# Patient Record
Sex: MALE | Race: BLACK OR AFRICAN AMERICAN | Employment: UNEMPLOYED
[De-identification: names, ages, dates, MRNs, and addresses within clinical notes are randomized per-mention and may not be internally consistent; named-entity substitution may affect disease eponyms.]

---

## 2017-01-24 ENCOUNTER — OFFICE VISIT (OUTPATIENT)
Dept: INTERNAL MEDICINE | Facility: CLINIC | Age: 42
End: 2017-01-24

## 2017-01-24 VITALS
WEIGHT: 228.6 LBS | DIASTOLIC BLOOD PRESSURE: 70 MMHG | RESPIRATION RATE: 21 BRPM | HEART RATE: 86 BPM | SYSTOLIC BLOOD PRESSURE: 130 MMHG | TEMPERATURE: 99 F | BODY MASS INDEX: 35.88 KG/M2 | HEIGHT: 67 IN | OXYGEN SATURATION: 98 %

## 2017-01-24 DIAGNOSIS — R31.9 HEMATURIA: Primary | ICD-10-CM

## 2017-01-24 PROCEDURE — 99213 OFFICE O/P EST LOW 20 MIN: CPT | Performed by: FAMILY MEDICINE

## 2017-01-24 RX ORDER — CLOTRIMAZOLE AND BETAMETHASONE DIPROPIONATE 10; .64 MG/G; MG/G
CREAM TOPICAL
Refills: 0 | COMMUNITY
Start: 2017-01-02 | End: 2017-03-14

## 2017-01-24 ASSESSMENT — ENCOUNTER SYMPTOMS
FACIAL SWELLING: 0
EYE REDNESS: 0
SHORTNESS OF BREATH: 0
CONSTIPATION: 0
BACK PAIN: 0
COUGH: 0
SORE THROAT: 0
TROUBLE SWALLOWING: 0
STRIDOR: 0
ABDOMINAL PAIN: 1
WHEEZING: 0
ABDOMINAL DISTENTION: 0
ANAL BLEEDING: 0
EYE DISCHARGE: 0
EYE PAIN: 0
CHEST TIGHTNESS: 0
COLOR CHANGE: 0

## 2017-01-24 ASSESSMENT — PATIENT HEALTH QUESTIONNAIRE - PHQ9
1. LITTLE INTEREST OR PLEASURE IN DOING THINGS: 0
2. FEELING DOWN, DEPRESSED OR HOPELESS: 0
SUM OF ALL RESPONSES TO PHQ9 QUESTIONS 1 & 2: 0
SUM OF ALL RESPONSES TO PHQ QUESTIONS 1-9: 0

## 2017-01-27 ENCOUNTER — TELEPHONE (OUTPATIENT)
Dept: INTERNAL MEDICINE | Facility: CLINIC | Age: 42
End: 2017-01-27

## 2017-01-27 DIAGNOSIS — R31.9 HEMATURIA: Primary | ICD-10-CM

## 2017-02-03 DIAGNOSIS — I10 ESSENTIAL HYPERTENSION: ICD-10-CM

## 2017-02-06 RX ORDER — HYDROCHLOROTHIAZIDE 25 MG/1
TABLET ORAL
Qty: 30 TABLET | Refills: 5 | Status: SHIPPED | OUTPATIENT
Start: 2017-02-06 | End: 2017-08-10 | Stop reason: SDUPTHER

## 2017-02-07 ENCOUNTER — TELEPHONE (OUTPATIENT)
Dept: INTERNAL MEDICINE | Facility: CLINIC | Age: 42
End: 2017-02-07

## 2017-02-23 ENCOUNTER — OFFICE VISIT (OUTPATIENT)
Dept: UROLOGY | Facility: CLINIC | Age: 42
End: 2017-02-23

## 2017-02-23 VITALS
DIASTOLIC BLOOD PRESSURE: 88 MMHG | SYSTOLIC BLOOD PRESSURE: 120 MMHG | HEIGHT: 67 IN | WEIGHT: 228 LBS | TEMPERATURE: 99 F | BODY MASS INDEX: 35.79 KG/M2 | HEART RATE: 92 BPM

## 2017-02-23 DIAGNOSIS — R31.9 HEMATURIA: ICD-10-CM

## 2017-02-23 DIAGNOSIS — R35.0 FREQUENCY OF URINATION: Primary | ICD-10-CM

## 2017-02-23 LAB
BILIRUBIN, POC: ABNORMAL
BLOOD URINE, POC: ABNORMAL
CLARITY, POC: CLEAR
COLOR, POC: ABNORMAL
GLUCOSE URINE, POC: ABNORMAL
KETONES, POC: ABNORMAL
LEUKOCYTE EST, POC: ABNORMAL
NITRITE, POC: ABNORMAL
PH, POC: ABNORMAL
PROTEIN, POC: ABNORMAL
SPECIFIC GRAVITY, POC: ABNORMAL
UROBILINOGEN, POC: ABNORMAL

## 2017-02-23 PROCEDURE — 81002 URINALYSIS NONAUTO W/O SCOPE: CPT | Performed by: UROLOGY

## 2017-02-23 PROCEDURE — 99204 OFFICE O/P NEW MOD 45 MIN: CPT | Performed by: UROLOGY

## 2017-02-23 ASSESSMENT — ENCOUNTER SYMPTOMS
WHEEZING: 0
BACK PAIN: 0
COUGH: 0
EYE PAIN: 0
ABDOMINAL PAIN: 1
NAUSEA: 0
SHORTNESS OF BREATH: 1
EYE REDNESS: 0
VOMITING: 0
COLOR CHANGE: 0

## 2017-02-28 ENCOUNTER — HOSPITAL ENCOUNTER (OUTPATIENT)
Age: 42
Setting detail: SPECIMEN
Discharge: HOME | End: 2017-02-28
Payer: MEDICAID

## 2017-02-28 ENCOUNTER — OFFICE VISIT (OUTPATIENT)
Dept: INTERNAL MEDICINE | Facility: CLINIC | Age: 42
End: 2017-02-28

## 2017-02-28 ENCOUNTER — TELEPHONE (OUTPATIENT)
Dept: UROLOGY | Facility: CLINIC | Age: 42
End: 2017-02-28

## 2017-02-28 VITALS
HEART RATE: 95 BPM | HEIGHT: 67 IN | TEMPERATURE: 98.6 F | SYSTOLIC BLOOD PRESSURE: 142 MMHG | WEIGHT: 227.2 LBS | BODY MASS INDEX: 35.66 KG/M2 | RESPIRATION RATE: 16 BRPM | DIASTOLIC BLOOD PRESSURE: 80 MMHG | OXYGEN SATURATION: 96 %

## 2017-02-28 DIAGNOSIS — K21.9 GASTROESOPHAGEAL REFLUX DISEASE WITHOUT ESOPHAGITIS: ICD-10-CM

## 2017-02-28 DIAGNOSIS — R73.03 PRE-DIABETES: ICD-10-CM

## 2017-02-28 DIAGNOSIS — E78.5 DYSLIPIDEMIA: ICD-10-CM

## 2017-02-28 DIAGNOSIS — I10 ESSENTIAL HYPERTENSION: ICD-10-CM

## 2017-02-28 DIAGNOSIS — R31.9 HEMATURIA: Primary | ICD-10-CM

## 2017-02-28 PROCEDURE — 99213 OFFICE O/P EST LOW 20 MIN: CPT | Performed by: FAMILY MEDICINE

## 2017-02-28 RX ORDER — OMEPRAZOLE 20 MG/1
CAPSULE, DELAYED RELEASE ORAL
Qty: 30 CAPSULE | Refills: 5 | Status: SHIPPED | OUTPATIENT
Start: 2017-02-28 | End: 2017-10-05 | Stop reason: SDUPTHER

## 2017-02-28 ASSESSMENT — ENCOUNTER SYMPTOMS
CHEST TIGHTNESS: 0
WHEEZING: 0
SHORTNESS OF BREATH: 0
TROUBLE SWALLOWING: 0
BACK PAIN: 0
EYE PAIN: 0
ANAL BLEEDING: 0
COUGH: 0
FACIAL SWELLING: 0
STRIDOR: 0
ABDOMINAL PAIN: 0
EYE REDNESS: 0
COLOR CHANGE: 0
EYE DISCHARGE: 0
ABDOMINAL DISTENTION: 0
CONSTIPATION: 0
SORE THROAT: 0

## 2017-03-01 LAB
ESTIMATED AVERAGE GLUCOSE: 143 MG/DL
HBA1C MFR BLD: 6.6 % (ref 4–6)

## 2017-03-14 ENCOUNTER — HOSPITAL ENCOUNTER (OUTPATIENT)
Dept: PREADMISSION TESTING | Age: 42
Discharge: HOME | End: 2017-03-14
Payer: MEDICAID

## 2017-03-14 VITALS
DIASTOLIC BLOOD PRESSURE: 90 MMHG | WEIGHT: 226.85 LBS | HEIGHT: 66 IN | HEART RATE: 92 BPM | BODY MASS INDEX: 36.46 KG/M2 | RESPIRATION RATE: 20 BRPM | TEMPERATURE: 99 F | OXYGEN SATURATION: 98 % | SYSTOLIC BLOOD PRESSURE: 138 MMHG

## 2017-03-14 LAB
ANION GAP SERPL CALCULATED.3IONS-SCNC: 16 MMOL/L (ref 9–17)
BUN BLDV-MCNC: 12 MG/DL (ref 6–20)
CHLORIDE BLD-SCNC: 99 MMOL/L (ref 98–107)
CO2: 26 MMOL/L (ref 20–31)
CREAT SERPL-MCNC: 1.04 MG/DL (ref 0.7–1.2)
GFR AFRICAN AMERICAN: >60 ML/MIN
GFR NON-AFRICAN AMERICAN: >60 ML/MIN
GFR SERPL CREATININE-BSD FRML MDRD: NORMAL ML/MIN/{1.73_M2}
GFR SERPL CREATININE-BSD FRML MDRD: NORMAL ML/MIN/{1.73_M2}
GLUCOSE BLD-MCNC: 119 MG/DL (ref 70–99)
POTASSIUM SERPL-SCNC: 3.6 MMOL/L (ref 3.7–5.3)
SODIUM BLD-SCNC: 141 MMOL/L (ref 135–144)

## 2017-03-14 PROCEDURE — 87086 URINE CULTURE/COLONY COUNT: CPT

## 2017-03-14 PROCEDURE — 82947 ASSAY GLUCOSE BLOOD QUANT: CPT

## 2017-03-14 PROCEDURE — 84520 ASSAY OF UREA NITROGEN: CPT

## 2017-03-14 PROCEDURE — 36415 COLL VENOUS BLD VENIPUNCTURE: CPT

## 2017-03-14 PROCEDURE — 80051 ELECTROLYTE PANEL: CPT

## 2017-03-14 PROCEDURE — 82565 ASSAY OF CREATININE: CPT

## 2017-03-14 RX ORDER — SODIUM CHLORIDE, SODIUM LACTATE, POTASSIUM CHLORIDE, CALCIUM CHLORIDE 600; 310; 30; 20 MG/100ML; MG/100ML; MG/100ML; MG/100ML
1000 INJECTION, SOLUTION INTRAVENOUS CONTINUOUS
Status: CANCELLED | OUTPATIENT
Start: 2017-03-14

## 2017-03-15 LAB
CULTURE: NO GROWTH
CULTURE: NORMAL
Lab: NORMAL
SPECIMEN DESCRIPTION: NORMAL
STATUS: NORMAL

## 2017-03-20 ENCOUNTER — TELEPHONE (OUTPATIENT)
Dept: UROLOGY | Age: 42
End: 2017-03-20

## 2017-03-21 ENCOUNTER — ANESTHESIA (OUTPATIENT)
Dept: OPERATING ROOM | Age: 42
End: 2017-03-21
Payer: MEDICAID

## 2017-03-21 ENCOUNTER — ANESTHESIA EVENT (OUTPATIENT)
Dept: OPERATING ROOM | Age: 42
End: 2017-03-21
Payer: MEDICAID

## 2017-03-21 ENCOUNTER — HOSPITAL ENCOUNTER (OUTPATIENT)
Age: 42
Setting detail: OUTPATIENT SURGERY
Discharge: HOME | End: 2017-03-21
Attending: UROLOGY | Admitting: UROLOGY
Payer: MEDICAID

## 2017-03-21 ENCOUNTER — APPOINTMENT (OUTPATIENT)
Dept: GENERAL RADIOLOGY | Age: 42
End: 2017-03-21
Attending: UROLOGY
Payer: MEDICAID

## 2017-03-21 VITALS
DIASTOLIC BLOOD PRESSURE: 93 MMHG | HEIGHT: 66 IN | SYSTOLIC BLOOD PRESSURE: 139 MMHG | RESPIRATION RATE: 18 BRPM | OXYGEN SATURATION: 96 % | HEART RATE: 82 BPM | TEMPERATURE: 97.2 F | BODY MASS INDEX: 36.32 KG/M2 | WEIGHT: 226 LBS

## 2017-03-21 VITALS — OXYGEN SATURATION: 96 % | DIASTOLIC BLOOD PRESSURE: 73 MMHG | SYSTOLIC BLOOD PRESSURE: 94 MMHG

## 2017-03-21 LAB
CASE NUMBER:: NORMAL
POC POTASSIUM: 3.5 MMOL/L (ref 3.5–5.1)
SPECIMEN DESCRIPTION: NORMAL

## 2017-03-21 PROCEDURE — 84132 ASSAY OF SERUM POTASSIUM: CPT

## 2017-03-21 PROCEDURE — 74420 UROGRAPHY RTRGR +-KUB: CPT

## 2017-03-21 PROCEDURE — 3600000002 HC SURGERY LEVEL 2 BASE: Performed by: UROLOGY

## 2017-03-21 PROCEDURE — 7100000000 HC PACU RECOVERY - FIRST 15 MIN: Performed by: UROLOGY

## 2017-03-21 PROCEDURE — 6360000004 HC RX CONTRAST MEDICATION: Performed by: UROLOGY

## 2017-03-21 PROCEDURE — C1758 CATHETER, URETERAL: HCPCS | Performed by: UROLOGY

## 2017-03-21 PROCEDURE — 6360000002 HC RX W HCPCS: Performed by: ANESTHESIOLOGY

## 2017-03-21 PROCEDURE — 6360000002 HC RX W HCPCS: Performed by: UROLOGY

## 2017-03-21 PROCEDURE — 7100000001 HC PACU RECOVERY - ADDTL 15 MIN: Performed by: UROLOGY

## 2017-03-21 PROCEDURE — 2580000003 HC RX 258: Performed by: ANESTHESIOLOGY

## 2017-03-21 PROCEDURE — 3700000000 HC ANESTHESIA ATTENDED CARE: Performed by: UROLOGY

## 2017-03-21 PROCEDURE — 7100000010 HC PHASE II RECOVERY - FIRST 15 MIN: Performed by: UROLOGY

## 2017-03-21 PROCEDURE — 3700000001 HC ADD 15 MINUTES (ANESTHESIA): Performed by: UROLOGY

## 2017-03-21 PROCEDURE — 6360000002 HC RX W HCPCS: Performed by: NURSE ANESTHETIST, CERTIFIED REGISTERED

## 2017-03-21 PROCEDURE — 88112 CYTOPATH CELL ENHANCE TECH: CPT

## 2017-03-21 PROCEDURE — 3600000012 HC SURGERY LEVEL 2 ADDTL 15MIN: Performed by: UROLOGY

## 2017-03-21 PROCEDURE — 2500000003 HC RX 250 WO HCPCS: Performed by: NURSE ANESTHETIST, CERTIFIED REGISTERED

## 2017-03-21 RX ORDER — LIDOCAINE HYDROCHLORIDE 10 MG/ML
INJECTION, SOLUTION INFILTRATION; PERINEURAL PRN
Status: DISCONTINUED | OUTPATIENT
Start: 2017-03-21 | End: 2017-03-21 | Stop reason: SDUPTHER

## 2017-03-21 RX ORDER — ONDANSETRON 2 MG/ML
INJECTION INTRAMUSCULAR; INTRAVENOUS PRN
Status: DISCONTINUED | OUTPATIENT
Start: 2017-03-21 | End: 2017-03-21 | Stop reason: SDUPTHER

## 2017-03-21 RX ORDER — FENTANYL CITRATE 50 UG/ML
INJECTION, SOLUTION INTRAMUSCULAR; INTRAVENOUS PRN
Status: DISCONTINUED | OUTPATIENT
Start: 2017-03-21 | End: 2017-03-21 | Stop reason: SDUPTHER

## 2017-03-21 RX ORDER — MEPERIDINE HYDROCHLORIDE 50 MG/ML
12.5 INJECTION INTRAMUSCULAR; INTRAVENOUS; SUBCUTANEOUS EVERY 5 MIN PRN
Status: DISCONTINUED | OUTPATIENT
Start: 2017-03-21 | End: 2017-03-21 | Stop reason: HOSPADM

## 2017-03-21 RX ORDER — CIPROFLOXACIN 2 MG/ML
400 INJECTION, SOLUTION INTRAVENOUS
Status: COMPLETED | OUTPATIENT
Start: 2017-03-21 | End: 2017-03-21

## 2017-03-21 RX ORDER — ONDANSETRON 2 MG/ML
4 INJECTION INTRAMUSCULAR; INTRAVENOUS
Status: DISCONTINUED | OUTPATIENT
Start: 2017-03-21 | End: 2017-03-21 | Stop reason: HOSPADM

## 2017-03-21 RX ORDER — PROPOFOL 10 MG/ML
INJECTION, EMULSION INTRAVENOUS PRN
Status: DISCONTINUED | OUTPATIENT
Start: 2017-03-21 | End: 2017-03-21 | Stop reason: SDUPTHER

## 2017-03-21 RX ORDER — FENTANYL CITRATE 50 UG/ML
50 INJECTION, SOLUTION INTRAMUSCULAR; INTRAVENOUS EVERY 5 MIN PRN
Status: DISCONTINUED | OUTPATIENT
Start: 2017-03-21 | End: 2017-03-21 | Stop reason: HOSPADM

## 2017-03-21 RX ORDER — FENTANYL CITRATE 50 UG/ML
25 INJECTION, SOLUTION INTRAMUSCULAR; INTRAVENOUS EVERY 5 MIN PRN
Status: DISCONTINUED | OUTPATIENT
Start: 2017-03-21 | End: 2017-03-21 | Stop reason: HOSPADM

## 2017-03-21 RX ORDER — MIDAZOLAM HYDROCHLORIDE 1 MG/ML
INJECTION INTRAMUSCULAR; INTRAVENOUS PRN
Status: DISCONTINUED | OUTPATIENT
Start: 2017-03-21 | End: 2017-03-21 | Stop reason: SDUPTHER

## 2017-03-21 RX ORDER — SUCCINYLCHOLINE CHLORIDE 20 MG/ML
INJECTION INTRAMUSCULAR; INTRAVENOUS PRN
Status: DISCONTINUED | OUTPATIENT
Start: 2017-03-21 | End: 2017-03-21 | Stop reason: SDUPTHER

## 2017-03-21 RX ORDER — SODIUM CHLORIDE, SODIUM LACTATE, POTASSIUM CHLORIDE, CALCIUM CHLORIDE 600; 310; 30; 20 MG/100ML; MG/100ML; MG/100ML; MG/100ML
1000 INJECTION, SOLUTION INTRAVENOUS CONTINUOUS
Status: DISCONTINUED | OUTPATIENT
Start: 2017-03-21 | End: 2017-03-21 | Stop reason: HOSPADM

## 2017-03-21 RX ADMIN — LIDOCAINE HYDROCHLORIDE 50 MG: 10 INJECTION, SOLUTION INFILTRATION; PERINEURAL at 12:20

## 2017-03-21 RX ADMIN — FENTANYL CITRATE 50 MCG: 50 INJECTION INTRAMUSCULAR; INTRAVENOUS at 12:26

## 2017-03-21 RX ADMIN — FENTANYL CITRATE 50 MCG: 50 INJECTION INTRAMUSCULAR; INTRAVENOUS at 12:19

## 2017-03-21 RX ADMIN — SODIUM CHLORIDE, POTASSIUM CHLORIDE, SODIUM LACTATE AND CALCIUM CHLORIDE: 600; 310; 30; 20 INJECTION, SOLUTION INTRAVENOUS at 12:19

## 2017-03-21 RX ADMIN — SUCCINYLCHOLINE CHLORIDE 120 MG: 20 INJECTION, SOLUTION INTRAMUSCULAR; INTRAVENOUS at 12:59

## 2017-03-21 RX ADMIN — FENTANYL CITRATE 50 MCG: 50 INJECTION, SOLUTION INTRAMUSCULAR; INTRAVENOUS at 13:22

## 2017-03-21 RX ADMIN — MIDAZOLAM HYDROCHLORIDE 2 MG: 1 INJECTION, SOLUTION INTRAMUSCULAR; INTRAVENOUS at 12:19

## 2017-03-21 RX ADMIN — ONDANSETRON 4 MG: 2 INJECTION, SOLUTION INTRAMUSCULAR; INTRAVENOUS at 12:55

## 2017-03-21 RX ADMIN — CIPROFLOXACIN 400 MG: 2 INJECTION, SOLUTION INTRAVENOUS at 12:25

## 2017-03-21 RX ADMIN — PROPOFOL 200 MG: 10 INJECTION, EMULSION INTRAVENOUS at 12:20

## 2017-03-21 ASSESSMENT — PAIN DESCRIPTION - PAIN TYPE: TYPE: SURGICAL PAIN

## 2017-03-21 ASSESSMENT — PAIN - FUNCTIONAL ASSESSMENT: PAIN_FUNCTIONAL_ASSESSMENT: 0-10

## 2017-03-21 ASSESSMENT — PAIN SCALES - GENERAL
PAINLEVEL_OUTOF10: 0
PAINLEVEL_OUTOF10: 7

## 2017-03-21 ASSESSMENT — PAIN DESCRIPTION - LOCATION: LOCATION: PENIS

## 2017-03-21 ASSESSMENT — PAIN DESCRIPTION - DESCRIPTORS: DESCRIPTORS: BURNING

## 2017-03-22 LAB — SURGICAL PATHOLOGY REPORT: NORMAL

## 2017-04-18 ENCOUNTER — OFFICE VISIT (OUTPATIENT)
Dept: UROLOGY | Age: 42
End: 2017-04-18
Payer: MEDICAID

## 2017-04-18 VITALS
SYSTOLIC BLOOD PRESSURE: 123 MMHG | BODY MASS INDEX: 36.1 KG/M2 | HEART RATE: 108 BPM | TEMPERATURE: 99.1 F | DIASTOLIC BLOOD PRESSURE: 85 MMHG | HEIGHT: 67 IN | WEIGHT: 230 LBS

## 2017-04-18 DIAGNOSIS — R31.9 HEMATURIA: Primary | ICD-10-CM

## 2017-04-18 PROCEDURE — 99213 OFFICE O/P EST LOW 20 MIN: CPT | Performed by: UROLOGY

## 2017-04-18 RX ORDER — OXYCODONE HYDROCHLORIDE AND ACETAMINOPHEN 5; 325 MG/1; MG/1
TABLET ORAL
Refills: 0 | COMMUNITY
Start: 2017-03-06 | End: 2017-07-25 | Stop reason: ALTCHOICE

## 2017-04-18 RX ORDER — AMMONIUM LACTATE 12 G/100G
CREAM TOPICAL
Refills: 0 | COMMUNITY
Start: 2017-03-16 | End: 2017-10-05 | Stop reason: ALTCHOICE

## 2017-04-18 ASSESSMENT — ENCOUNTER SYMPTOMS
WHEEZING: 0
EYE PAIN: 0
BACK PAIN: 0
NAUSEA: 0
EYE REDNESS: 0
SHORTNESS OF BREATH: 0
ABDOMINAL PAIN: 0
COLOR CHANGE: 0
VOMITING: 0
COUGH: 0

## 2017-07-11 ENCOUNTER — TELEPHONE (OUTPATIENT)
Dept: INTERNAL MEDICINE CLINIC | Age: 42
End: 2017-07-11

## 2017-07-12 RX ORDER — POTASSIUM BICARBONATE 25 MEQ/1
TABLET, EFFERVESCENT ORAL
Qty: 60 TABLET | Refills: 5 | Status: SHIPPED | OUTPATIENT
Start: 2017-07-12 | End: 2018-04-10 | Stop reason: SDUPTHER

## 2017-07-25 ENCOUNTER — HOSPITAL ENCOUNTER (OUTPATIENT)
Age: 42
Setting detail: SPECIMEN
Discharge: HOME | End: 2017-07-25
Payer: MEDICAID

## 2017-07-25 ENCOUNTER — OFFICE VISIT (OUTPATIENT)
Dept: INTERNAL MEDICINE CLINIC | Age: 42
End: 2017-07-25
Payer: MEDICAID

## 2017-07-25 VITALS
WEIGHT: 224.6 LBS | BODY MASS INDEX: 35.25 KG/M2 | HEART RATE: 88 BPM | HEIGHT: 67 IN | TEMPERATURE: 97.4 F | DIASTOLIC BLOOD PRESSURE: 80 MMHG | OXYGEN SATURATION: 98 % | RESPIRATION RATE: 21 BRPM | SYSTOLIC BLOOD PRESSURE: 120 MMHG

## 2017-07-25 DIAGNOSIS — Z12.5 PROSTATE CANCER SCREENING: ICD-10-CM

## 2017-07-25 DIAGNOSIS — K21.9 GASTROESOPHAGEAL REFLUX DISEASE WITHOUT ESOPHAGITIS: ICD-10-CM

## 2017-07-25 DIAGNOSIS — R73.03 PRE-DIABETES: ICD-10-CM

## 2017-07-25 DIAGNOSIS — E78.5 DYSLIPIDEMIA: ICD-10-CM

## 2017-07-25 DIAGNOSIS — I10 ESSENTIAL HYPERTENSION: ICD-10-CM

## 2017-07-25 DIAGNOSIS — I10 ESSENTIAL HYPERTENSION: Primary | ICD-10-CM

## 2017-07-25 LAB
ANION GAP SERPL CALCULATED.3IONS-SCNC: 18 MMOL/L (ref 9–17)
BUN BLDV-MCNC: 11 MG/DL (ref 6–20)
BUN/CREAT BLD: ABNORMAL (ref 9–20)
CALCIUM SERPL-MCNC: 9.7 MG/DL (ref 8.6–10.4)
CHLORIDE BLD-SCNC: 101 MMOL/L (ref 98–107)
CO2: 25 MMOL/L (ref 20–31)
CREAT SERPL-MCNC: 0.84 MG/DL (ref 0.7–1.2)
GFR AFRICAN AMERICAN: >60 ML/MIN
GFR NON-AFRICAN AMERICAN: >60 ML/MIN
GFR SERPL CREATININE-BSD FRML MDRD: ABNORMAL ML/MIN/{1.73_M2}
GFR SERPL CREATININE-BSD FRML MDRD: ABNORMAL ML/MIN/{1.73_M2}
GLUCOSE BLD-MCNC: 107 MG/DL (ref 70–99)
POTASSIUM SERPL-SCNC: 4 MMOL/L (ref 3.7–5.3)
PROSTATE SPECIFIC ANTIGEN: 0.5 UG/L
SODIUM BLD-SCNC: 144 MMOL/L (ref 135–144)

## 2017-07-25 PROCEDURE — 99213 OFFICE O/P EST LOW 20 MIN: CPT | Performed by: FAMILY MEDICINE

## 2017-07-25 RX ORDER — POTASSIUM CHLORIDE 750 MG/1
TABLET, FILM COATED, EXTENDED RELEASE ORAL
Refills: 0 | COMMUNITY
Start: 2017-05-18 | End: 2017-07-25 | Stop reason: SDUPTHER

## 2017-07-25 ASSESSMENT — ENCOUNTER SYMPTOMS
STRIDOR: 0
ANAL BLEEDING: 0
EYE REDNESS: 0
FACIAL SWELLING: 0
CHEST TIGHTNESS: 0
COUGH: 0
WHEEZING: 0
BACK PAIN: 0
SHORTNESS OF BREATH: 0
ABDOMINAL DISTENTION: 0
EYE PAIN: 0
ABDOMINAL PAIN: 0
TROUBLE SWALLOWING: 0
COLOR CHANGE: 0
EYE DISCHARGE: 0
SORE THROAT: 0
CONSTIPATION: 0

## 2017-07-26 LAB
ESTIMATED AVERAGE GLUCOSE: 131 MG/DL
HBA1C MFR BLD: 6.2 % (ref 4–6)

## 2017-08-10 DIAGNOSIS — I10 ESSENTIAL HYPERTENSION: ICD-10-CM

## 2017-08-11 RX ORDER — HYDROCHLOROTHIAZIDE 25 MG/1
TABLET ORAL
Qty: 30 TABLET | Refills: 5 | Status: SHIPPED | OUTPATIENT
Start: 2017-08-11 | End: 2018-01-05

## 2017-09-20 ENCOUNTER — TELEPHONE (OUTPATIENT)
Dept: UROLOGY | Age: 42
End: 2017-09-20

## 2017-10-05 ENCOUNTER — OFFICE VISIT (OUTPATIENT)
Dept: FAMILY MEDICINE CLINIC | Age: 42
End: 2017-10-05
Payer: MEDICAID

## 2017-10-05 ENCOUNTER — OFFICE VISIT (OUTPATIENT)
Dept: UROLOGY | Age: 42
End: 2017-10-05
Payer: MEDICAID

## 2017-10-05 VITALS
DIASTOLIC BLOOD PRESSURE: 77 MMHG | RESPIRATION RATE: 20 BRPM | TEMPERATURE: 97.9 F | SYSTOLIC BLOOD PRESSURE: 120 MMHG | HEART RATE: 89 BPM | WEIGHT: 226 LBS | OXYGEN SATURATION: 94 % | BODY MASS INDEX: 35.47 KG/M2 | HEIGHT: 67 IN

## 2017-10-05 VITALS
SYSTOLIC BLOOD PRESSURE: 116 MMHG | WEIGHT: 228.6 LBS | DIASTOLIC BLOOD PRESSURE: 81 MMHG | TEMPERATURE: 98 F | BODY MASS INDEX: 35.88 KG/M2 | HEIGHT: 67 IN | HEART RATE: 80 BPM

## 2017-10-05 DIAGNOSIS — I10 ESSENTIAL HYPERTENSION: Primary | ICD-10-CM

## 2017-10-05 DIAGNOSIS — R39.15 URGENCY OF URINATION: Primary | ICD-10-CM

## 2017-10-05 DIAGNOSIS — G47.33 OSA (OBSTRUCTIVE SLEEP APNEA): ICD-10-CM

## 2017-10-05 DIAGNOSIS — R35.1 NOCTURIA: ICD-10-CM

## 2017-10-05 DIAGNOSIS — R32 ENURESIS: ICD-10-CM

## 2017-10-05 DIAGNOSIS — R35.0 FREQUENCY OF MICTURITION: ICD-10-CM

## 2017-10-05 DIAGNOSIS — K21.9 GASTROESOPHAGEAL REFLUX DISEASE WITHOUT ESOPHAGITIS: ICD-10-CM

## 2017-10-05 DIAGNOSIS — R73.03 PRE-DIABETES: ICD-10-CM

## 2017-10-05 PROCEDURE — 99214 OFFICE O/P EST MOD 30 MIN: CPT | Performed by: NURSE PRACTITIONER

## 2017-10-05 PROCEDURE — 99214 OFFICE O/P EST MOD 30 MIN: CPT | Performed by: UROLOGY

## 2017-10-05 RX ORDER — OMEPRAZOLE 20 MG/1
CAPSULE, DELAYED RELEASE ORAL
Qty: 30 CAPSULE | Refills: 5 | Status: SHIPPED | OUTPATIENT
Start: 2017-10-05 | End: 2018-04-10 | Stop reason: SDUPTHER

## 2017-10-05 RX ORDER — OXYBUTYNIN CHLORIDE 10 MG/1
10 TABLET, EXTENDED RELEASE ORAL DAILY
Qty: 30 TABLET | Refills: 2 | Status: SHIPPED | OUTPATIENT
Start: 2017-10-05 | End: 2017-10-05 | Stop reason: ALTCHOICE

## 2017-10-05 ASSESSMENT — ENCOUNTER SYMPTOMS
BACK PAIN: 0
ABDOMINAL PAIN: 0
CONSTIPATION: 0
NAUSEA: 0
COUGH: 0
CHEST TIGHTNESS: 0
SHORTNESS OF BREATH: 0
DIARRHEA: 0
VOMITING: 0
WHEEZING: 0
EYE PAIN: 0
EYE REDNESS: 0
ABDOMINAL PAIN: 0
NAUSEA: 0
COLOR CHANGE: 0
SHORTNESS OF BREATH: 0
COUGH: 0

## 2017-10-05 NOTE — MR AVS SNAPSHOT
estimate of body fat, calculated from your height and weight. The higher your BMI, the greater your risk of heart disease, high blood pressure, type 2 diabetes, stroke, gallstones, arthritis, sleep apnea, and certain cancers. BMI is not perfect. It may overestimate body fat in athletes and people who are more muscular. Even a small weight loss (between 5 and 10 percent of your current weight) by decreasing your calorie intake and becoming more physically active will help lower your risk of developing or worsening diseases associated with obesity. Learn more at: [email or web address].uk             Today's Medication Changes          These changes are accurate as of: 10/5/17 11:28 AM.  If you have any questions, ask your nurse or doctor. START taking these medications           oxybutynin 10 MG extended release tablet   Commonly known as:  DITROPAN-XL   Instructions: Take 1 tablet by mouth daily   Quantity:  30 tablet   Refills:  2   Started by:  Neno Taylor MD            Where to Get Your Medications      These medications were sent to [place] [id number]. Adrian Caputo [phone] Dominique Belle [phone]  [address] [place] [phone]     Phone:  [phone]     oxybutynin 10 MG extended release tablet               Your Current Medications Are              oxybutynin (DITROPAN-XL) 10 MG extended release tablet Take 1 tablet by mouth daily    hydrochlorothiazide (HYDRODIURIL) 25 MG tablet take 1 capsule by mouth once daily    potassium bicarbonate (EFFER-K) 25 MEQ disintegrating tablet Dissolve two tablets into 4 to 8 oz of water daily    ammonium lactate (AMLACTIN) 12 % cream apply to affected area twice a day    omeprazole (PRILOSEC) 20 MG delayed release capsule Take one tablet daily for acid indigestion and heartburn    clobetasol (TEMOVATE) 0.05 % ointment Apply topically 2 times daily.       Allergies No Known Allergies         Additional Information        Basic Information     Date Of Birth Sex Race Ethnicity Preferred Language    1975 Male Black Non-/Non  English      Problem List as of 10/5/2017  Date Reviewed: 7/25/2017                Hematuria    Lumbar strain    Lipid screening    GERD (gastroesophageal reflux disease)    Dyslipidemia    Pre-diabetes    HLD (hyperlipidemia)    Rectal bleeding    Hypertension      Your Goals as of 10/5/2017 at 11:28 AM                 Lifestyle    Use a nicotine replacement product or medication     Notes    Patient Self-Management Goal for Health Maintenance  Goal: I will chose a goal related to tobacco cessation:  I will think about my triggers for smoking. Barriers: none  Plan for overcoming my barriers: N/A  Confidence: 6/10  Anticipated Goal Completion Date: 10/2017        Preventive Care        Date Due    Yearly Flu Vaccine (1) 10/19/2017 (Originally 9/1/2017)    Pneumococcal Vaccine - Pneumovax for adults aged 19-64 years with: chronic heart disease, chronic lung disease, diabetes mellitus, alcoholism, chronic liver disease, or cigarette smoking. (1 of 1 - PPSV23) 10/26/2017 (Originally 9/11/1993)    Tetanus Combination Vaccine (1 - Tdap) 2/21/2018 (Originally 9/11/1993)    Diabetes Screening 7/25/2020    Cholesterol Screening 4/19/2021            itBit Signup           itBit allows you to send messages to your doctor, view your test results, renew your prescriptions, schedule appointments, view visit notes, and more. How Do I Sign Up? 1. In your Internet browser, go to [email or web address]  2. Click on the Sign Up Now link in the Sign In box. You will see the New Member Sign Up page. 3. Enter your itBit Access Code exactly as it appears below. You will not need to use this code after youve completed the sign-up process. If you do not sign up before the expiration date, you must request a new code. Mobile Theory Access Code: [email or web address]  Expires: 12/4/2017 11:28 AM    4. Enter your Social Security Number ([id number]) and Date of Birth (mm/dd/yyyy) as indicated and click Submit. You will be taken to the next sign-up page. 5. Create a Mobile Theory ID. This will be your Mobile Theory login ID and cannot be changed, so think of one that is secure and easy to remember. 6. Create a Mobile Theory password. You can change your password at any time. 7. Enter your Password Reset Question and Answer. This can be used at a later time if you forget your password. 8. Enter your e-mail address. You will receive e-mail notification when new information is available in [address] 9. Click Sign Up. You can now view your medical record. Additional Information  If you have questions, please contact the physician practice where you receive care. Remember, Mobile Theory is NOT to be used for urgent needs. For medical emergencies, dial 911. For questions regarding your Mobile Theory account call [phone]. If you have a clinical question, please call your doctor's office.

## 2017-10-05 NOTE — PROGRESS NOTES
Have you seen any other physician or provider since your last visit no    Have you had any other diagnostic tests since your last visit? no    Have you changed or stopped any medications since your last visit including any over-the-counter medicines, vitamins, or herbal medicines? no     Are you taking all your prescribed medications? Yes  If NO, why? -  N/A           Patient Self-Management Goal for this visit. What is your goal for your visit today? - est care   Barriers to success: none   Plan for overcoming my barriers: N/A      Confidence: 10/10   Date goal set: 10/5/17   Date expected to reach goal: 2days    Medical history Review  Past Medical, Family, and Social History reviewed and does not contribute to the patient presenting condition    There are no preventive care reminders to display for this patient.

## 2017-10-05 NOTE — PROGRESS NOTES
External ear normal.   Nose: Nose normal.   Mouth/Throat: Oropharynx is clear and moist.   Eyes: Conjunctivae and EOM are normal. Pupils are equal, round, and reactive to light. Neck: Normal range of motion. Cardiovascular: Normal rate, regular rhythm and normal heart sounds. Pulmonary/Chest: Effort normal and breath sounds normal.   Abdominal: Soft. Bowel sounds are normal.   Musculoskeletal: Normal range of motion. Pain free ROM     Neurological: He is alert and oriented to person, place, and time. Gait normal     Skin: Skin is warm and dry. No rash noted. Psychiatric: He has a normal mood and affect. His behavior is normal. Judgment and thought content normal.      Data Review  CBC:   Lab Results   Component Value Date    WBC 6.7 03/01/2017    RBC 5.22 03/01/2017    RBC 4.89 02/16/2012     BMP:   Lab Results   Component Value Date    GLUCOSE 107 07/25/2017    CO2 25 07/25/2017    BUN 11 07/25/2017    CREATININE 0.84 07/25/2017    CALCIUM 9.7 07/25/2017      Assessment:       1. Essential hypertension     2. Gastroesophageal reflux disease without esophagitis  omeprazole (PRILOSEC) 20 MG delayed release capsule   3. AIDEN (obstructive sleep apnea)  DME Order for CPAP as OP   4. Pre-diabetes         Plan:      1. Essential hypertension  Continue med  Diet and exercise discussed  Low salt diet  Modest weight loss  2. Gastroesophageal reflux disease without esophagitis  Will continue  - omeprazole (PRILOSEC) 20 MG delayed release capsule; Take one tablet daily for acid indigestion and heartburn  Dispense: 30 capsule; Refill: 5    3. AIDEN (obstructive sleep apnea)  - DME Order for CPAP as OP    4. Pre-diabetes  Diet and exercise discussed at length  Diabetes education provided today:    Diabetes pathoetiology, difference between type 1 and type 2 diabetes, and progressive nature of Type 2 DM. Nutrition as a mainstream of diabetes therapy. Camilla about label reading.   Carbs: good carbs and bad carbs, importance of carb counting, incorporation of protein with each meal to reduce Glycemic index, importance of portions, Carb/insulin ratio. Fats: Good fats and bad fats, meal planning and supplements. Physical activity: advised to exercise 5-7 days a week 30-60 mins at least. Discussed how it affects BS readings. Will rto in 3 month for a1c and weight check    RTO if symptoms worsen or fail to improve  Pt agreeable with plan      Patient given educational materials - see patient instructions. Discussed use, benefit, and side effects of prescribed medications. All patient questions answered. Pt voiced understanding. Reviewed health maintenance. Instructed to continue current medications, diet and exercise. 1.  Yetta Notice received counseling on the following healthy behaviors: nutrition, exercise and medication adherence  2. Patient given educational materials when available - see patient instructions when applicable  3. Discussed use, benefit, and side effects of prescribed medications. Barriers to medication compliance addressed. All patient questions answered. Pt voiced understanding. 4.  Reviewed prior labs and health maintenance  5. Continue current medications, diet and exercise.     Completed Refills   Requested Prescriptions     Signed Prescriptions Disp Refills    omeprazole (PRILOSEC) 20 MG delayed release capsule 30 capsule 5     Sig: Take one tablet daily for acid indigestion and heartburn         Electronically signed by Beata Hwang CNP on 10/5/2017 at 2:45 PM

## 2017-10-05 NOTE — PROGRESS NOTES
[facility]  [facility] SPECIAL[facility] - [facility]  [address]  [address] [address]  Dept:  Cheryl Villalobos [facility] Urology Office Note - Established    Patient:  Tyrone Villafuerte  YOB: 1975  Date: 10/5/2017    The patient is a 36 y.o. male who presents today for evaluation of the following problems:   Chief Complaint   Patient presents with    Other     nocturia with incontinence       HPI  Here for new problem of nocturia. He was prior worked up for hematuria that was negative. The nocturia one month ago, getting up 2-3 x at night, has one episode of complete enuresis. He drinks tea up until bedtime, and wakes up during the night and drinks tea. During the day  frequency,  Urgency is big issue. Summary of old records: N/A    Additional History: N/A    Procedures Today: N/A    Urinalysis today:  No results found for this visit on 10/05/17. Last several PSA's:  Lab Results   Component Value Date    PSA 0.50 07/25/2017     Last total testosterone:  No results found for: TESTOSTERONE    AUA Symptom Score (10/5/2017):   INCOMPLETE EMPTYING: How often have you had the sensation of not emptying your bladder?: Less than Half the time  FREQUENCY: How often do you have to urinate less than every two hours?: Less than 1 to 5 times  INTERMITTENCY: How often have you found you stopped and started again several times when you urinated?: About Half the time  URGENCY: How often have you found it difficult to postpone urination?: Less than 1 to 5 times  WEAK STREAM: How often have you had a weak urinary stream?: Not at all  STRAINING: How often have you had to strain to start  urination?: Not at all  NOCTURIA: How many times did you typically get up at night to uriniate?: 3 Times  TOTAL I-PSS SCORE[de-identified] 10  How would you feel if you were to spend the rest of your life with your urinary condition?: Mostly Dissatisfied    Last BUN and creatinine:  Lab Results   Component Value Date    BUN Smokeless Tobacco    Never Used     (If patient a smoker, smoking cessation counseling offered)    History   Alcohol Use    2.5 oz/week    5 Standard drinks or equivalent per week     Comment: weekends/ 6 drinks       REVIEW OF SYSTEMS:  Review of Systems   Constitutional: Negative for activity change, chills and fever. Eyes: Negative for pain, redness and visual disturbance. Respiratory: Negative for cough, shortness of breath and wheezing. Cardiovascular: Negative for chest pain and leg swelling. Gastrointestinal: Negative for abdominal pain, nausea and vomiting. Genitourinary: Negative for difficulty urinating, discharge, dysuria, flank pain, frequency, hematuria, scrotal swelling and testicular pain. Musculoskeletal: Negative for back pain, joint swelling and myalgias. Skin: Negative for color change and rash. Neurological: Negative for dizziness, tremors and numbness. Hematological: Negative for adenopathy. Bruises/bleeds easily. Physical Exam:      Vitals:    10/05/17 1053   BP: 116/81   Pulse: 80   Temp: 98 °F (36.7 °C)     Body mass index is 35.8 kg/(m^2). Patient is a 36 y.o. male in no acute distress and alert and oriented to person, place and time. Physical Exam  Constitutional: Patient in no acute distress. Neuro: Alert and oriented to person, place and time. Psych: Mood normal, affect normal  Skin: No rash noted  HEENT: Head: Normocephalic and atraumatic  Conjunctivae and EOM are normal. Pupils are equal, round  Nose: Normal  Right External Ear: Normal; Left External Ear: Normal  Mouth: Mucosa Moist  Neck: Supple  Lungs: Respiratory effort is normal  Cardiovascular: Warm & Pink  Abdomen: Soft, non-tender, non-distended  Lymphatics: No palpable lymphadenopathy. Bladder non-tender and not distended. Musculoskeletal: Normal gait and station      Assessment and Plan      1. Urgency of urination    2. Enuresis    3. Frequency of micturition    4.  Nocturia           Plan: Decrease bladder irritants- tea    Decrease fluids to sips 2 hours before bed. Start Oxybutynin    F/u in 6 weeks to reassess s/s. Return in about 6 days (around 10/11/2017) for Follow up. Prescriptions Ordered:  Orders Placed This Encounter   Medications    oxybutynin (DITROPAN-XL) 10 MG extended release tablet     Sig: Take 1 tablet by mouth daily     Dispense:  30 tablet     Refill:  2      Orders Placed:  No orders of the defined types were placed in this encounter.          Molina Benoit MD

## 2017-10-05 NOTE — MR AVS SNAPSHOT
After Visit Summary             Estefania Patel   10/5/2017 2:00 PM   Office Visit    Description:  Male : 1975   Provider:  Brown Winn CNP   Department:  [facility]              Your Follow-Up and Future Appointments         Below is a list of your follow-up and future appointments. This may not be a complete list as you may have made appointments directly with providers that we are not aware of or your providers may have made some for you. Please call your providers to confirm appointments. It is important to keep your appointments. Please bring your current insurance card, photo ID, co-pay, and all medication bottles to your appointment. If self-pay, payment is expected at the time of service. Your To-Do List     Future Appointments Provider Department Dept Phone    2017 2:45 PM Sujit Fuentes MD [facility] Specialists [facility] [phone]    Please arrive 15 minutes prior to appointment, bring photo ID and insurance card. 2018 1:30 PM Brown Winn, [address] [place] [address] [phone]    Please arrive 15 minutes prior to appointment time, bring insurance card and photo ID. Information from Your Visit        Department     Name Address Phone Fax    [address] [facility] [address] [phone]      You Were Seen for:         Comments    Essential hypertension   [[id number]]         Vital Signs     Blood Pressure Pulse Temperature Respirations Height Weight    120/77 (Site: Right Arm, Position: Sitting, Cuff Size: Large Adult) 89 97.9 °F (36.6 °C) (Oral) 20 5' 6.93\" (1.7 m) 226 lb (102.5 kg)    Oxygen Saturation Body Mass Index Smoking Status             94% 35.47 kg/m2 Current Some Day Smoker         Additional Information about your Body Mass Index (BMI)           Your BMI as listed above is considered obese (30 or more).  BMI is an Nasal Mask 1 per 3 months and Cushions/Seals 2 per month    Headgear 1 per 6 months, Chin Strap 1 per 6 months, Disposable Filters 2 per month, Non-disposable Filters 1 per 6 months, Tubing 1 per 3 months, Integrated Heating Element 1 per 3 months, Chambers 1 per 6 months and Other Related Supplies. Replace supplies and accessories as needed. Patient may choose another interface for compliance and comfort. Comments: pt not sure of settings- hasn't used machine in a long time  Diagnosis: jorge  Length of need: Lifetime         Additional Information        Basic Information     Date Of Birth Sex Race Ethnicity Preferred Language    1975 Male Black Non-/Non  English      Problem List as of 10/5/2017  Date Reviewed: 7/25/2017                Hematuria    Lumbar strain    Lipid screening    GERD (gastroesophageal reflux disease)    Dyslipidemia    Pre-diabetes    HLD (hyperlipidemia)    Rectal bleeding    Hypertension      Your Goals as of 10/5/2017 at 1:54 PM                 Lifestyle    Use a nicotine replacement product or medication     Notes    Patient Self-Management Goal for Health Maintenance  Goal: I will chose a goal related to tobacco cessation:  I will think about my triggers for smoking.   Barriers: none  Plan for overcoming my barriers: N/A  Confidence: 6/10  Anticipated Goal Completion Date: 10/2017        Preventive Care        Date Due    Yearly Flu Vaccine (1) 10/19/2017 (Originally 9/1/2017)    Pneumococcal Vaccine - Pneumovax for adults aged 19-64 years with: chronic heart disease, chronic lung disease, diabetes mellitus, alcoholism, chronic liver disease, or cigarette smoking. (1 of 1 - PPSV23) 10/26/2017 (Originally 9/11/1993)    Tetanus Combination Vaccine (1 - Tdap) 2/21/2018 (Originally 9/11/1993)    Diabetes Screening 7/25/2020    Cholesterol Screening 4/19/2021            ASSURED PHARMACYhart Signup           Spootr allows you to send messages to your doctor, view your test

## 2017-10-10 ENCOUNTER — TELEPHONE (OUTPATIENT)
Dept: FAMILY MEDICINE CLINIC | Age: 42
End: 2017-10-10

## 2017-11-21 ENCOUNTER — OFFICE VISIT (OUTPATIENT)
Dept: UROLOGY | Age: 42
End: 2017-11-21
Payer: MEDICAID

## 2017-11-21 VITALS
SYSTOLIC BLOOD PRESSURE: 135 MMHG | HEIGHT: 67 IN | BODY MASS INDEX: 35.47 KG/M2 | DIASTOLIC BLOOD PRESSURE: 85 MMHG | WEIGHT: 226 LBS | TEMPERATURE: 98.6 F | HEART RATE: 92 BPM

## 2017-11-21 DIAGNOSIS — N32.81 OAB (OVERACTIVE BLADDER): Primary | ICD-10-CM

## 2017-11-21 DIAGNOSIS — R30.0 DYSURIA: ICD-10-CM

## 2017-11-21 PROCEDURE — 4004F PT TOBACCO SCREEN RCVD TLK: CPT | Performed by: UROLOGY

## 2017-11-21 PROCEDURE — G8427 DOCREV CUR MEDS BY ELIG CLIN: HCPCS | Performed by: UROLOGY

## 2017-11-21 PROCEDURE — G8417 CALC BMI ABV UP PARAM F/U: HCPCS | Performed by: UROLOGY

## 2017-11-21 PROCEDURE — 99213 OFFICE O/P EST LOW 20 MIN: CPT | Performed by: UROLOGY

## 2017-11-21 PROCEDURE — G8484 FLU IMMUNIZE NO ADMIN: HCPCS | Performed by: UROLOGY

## 2017-11-21 RX ORDER — AMMONIUM LACTATE 12 G/100G
LOTION TOPICAL PRN
COMMUNITY
End: 2018-07-31 | Stop reason: ALTCHOICE

## 2017-11-21 ASSESSMENT — ENCOUNTER SYMPTOMS
EYE PAIN: 0
BACK PAIN: 0
COLOR CHANGE: 0
VOMITING: 0
COUGH: 0
EYE REDNESS: 0
NAUSEA: 0
SHORTNESS OF BREATH: 0
WHEEZING: 0
ABDOMINAL PAIN: 0

## 2017-11-21 NOTE — PROGRESS NOTES
[facility]  [facility] UROLOGY SPECIALISTS - [place]  [address]  [address]  [address] [facility] [address]  Dept: [address] Cheryl Villalobos [facility] Urology Office Note - Established    Patient:  Lahoma Opitz  YOB: 1975  Date: 11/21/2017    The patient is a 36 y.o. male who presents today for evaluation of the following problems:   Chief Complaint   Patient presents with    Follow-up     urinary urgency . has laid off the tea        HPI  Hre for follow up on nocturia and urgency. He has cut back on his tea intake and feels things are better. He can now make it 2 hours between urinations, denies urgency, has a steady stream, occasionally weak, has nocturia 2x per night. He has had no enuresis with decreasing tea intake. Summary of old records: N/A    Additional History: N/A    Procedures Today: N/A    Urinalysis today:  No results found for this visit on 11/21/17. Last several PSA's:  Lab Results   Component Value Date    PSA 0.50 07/25/2017     Last total testosterone:  No results found for: TESTOSTERONE    AUA Symptom Score (11/21/2017):   INCOMPLETE EMPTYING: How often have you had the sensation of not emptying your bladder?: Not at all  FREQUENCY: How often do you have to urinate less than every two hours?: Not at all  INTERMITTENCY: How often have you found you stopped and started again several times when you urinated?: Not at all  URGENCY: How often have you found it difficult to postpone urination?: Not at all  WEAK STREAM: How often have you had a weak urinary stream?: Not at all  STRAINING: How often have you had to strain to start  urination?: Not at all  NOCTURIA: How many times did you typically get up at night to uriniate?: 2 Times  TOTAL I-PSS SCORE[de-identified] 2  How would you feel if you were to spend the rest of your life with your urinary condition?: Pleased    Last BUN and creatinine:  Lab Results   Component Value Date    BUN 11 07/25/2017     Lab Results   Component Value Date CREATININE 0.84 07/25/2017       Additional Lab/Culture results: none    Imaging Reviewed during this Office Visit: none    (results were independently reviewed by physician and radiology report verified)    PAST MEDICAL, FAMILY AND SOCIAL HISTORY UPDATE:  Past Medical History:   Diagnosis Date    Dry skin     GERD (gastroesophageal reflux disease)     Hematuria     HLD (hyperlipidemia) 3/2/2015    Hypertension     Pre-diabetes     Sleep apnea     does not use a machine    Wears dentures     upper     Past Surgical History:   Procedure Laterality Date    ANKLE SURGERY Left     fracture    ARTHROPLASTY      4th and 5th toe on left foot    COLONOSCOPY  12/22/2014    normal    CYSTOSCOPY  03/21/2017    retrograde    CYSTOSCOPY Bilateral 3/21/2017    CYSTOSCOPY, RETROGRADE PYELOGRAM performed by Pardeep Sorto MD at [facility] [address] Left 03/03/2017    left foot 5th digit derotational arthroplasty     Family History   Problem Relation Age of Onset    High Blood Pressure Mother     Diabetes Father      Outpatient Prescriptions Marked as Taking for the 11/21/17 encounter (Office Visit) with Pardeep Sorto MD   Medication Sig Dispense Refill    ammonium lactate (LAC-HYDRIN) 12 % lotion Apply topically as needed for Dry Skin Apply topically as needed.  omeprazole (PRILOSEC) 20 MG delayed release capsule Take one tablet daily for acid indigestion and heartburn 30 capsule 5    hydrochlorothiazide (HYDRODIURIL) 25 MG tablet take 1 capsule by mouth once daily 30 tablet 5    potassium bicarbonate (EFFER-K) 25 MEQ disintegrating tablet Dissolve two tablets into 4 to 8 oz of water daily 60 tablet 5       Review of patient's allergies indicates no known allergies.   History   Smoking Status    Current Some Day Smoker    Types: Cigars   Smokeless Tobacco    Never Used     Comment: occassionally     (If patient a smoker, smoking cessation counseling offered)    History   Alcohol Use    2.5 oz/week    5 Standard drinks or equivalent per week     Comment: weekends/ 6 drinks       REVIEW OF SYSTEMS:  Review of Systems   Constitutional: Negative for appetite change, chills and fever. Eyes: Negative for pain, redness and visual disturbance. Respiratory: Negative for cough, shortness of breath and wheezing. Cardiovascular: Negative for chest pain and leg swelling. Gastrointestinal: Negative for abdominal pain, nausea and vomiting. Genitourinary: Negative for difficulty urinating, discharge, dysuria, flank pain, frequency, hematuria, scrotal swelling, testicular pain and urgency. Musculoskeletal: Negative for back pain, joint swelling and myalgias. Skin: Negative for color change, rash and wound. Neurological: Negative for dizziness, tremors and numbness. Hematological: Negative for adenopathy. Bruises/bleeds easily. Physical Exam:      Vitals:    11/21/17 1506   BP: 135/85   Pulse: 92   Temp: 98.6 °F (37 °C)     Body mass index is 35.4 kg/m². Patient is a 36 y.o. male in no acute distress and alert and oriented to person, place and time. Physical Exam  Constitutional: Patient in no acute distress. Neuro: Alert and oriented to person, place and time. Psych: Mood normal, affect normal  Skin: No rash noted  HEENT: Head: Normocephalic and atraumatic  Conjunctivae and EOM are normal. Pupils are equal, round  Nose: Normal  Right External Ear: Normal; Left External Ear: Normal  Mouth: Mucosa Moist  Neck: Supple  Lungs: Respiratory effort is normal  Cardiovascular: Warm & Pink  Abdomen: Soft, non-tender, non-distended with no CVA,  No flank tenderness,  Or hepatosplenomegaly     Assessment and Plan      1. OAB (overactive bladder)    2. Dysuria           Plan:    cont oxybutinin, wean in 6 months  Doing a lot better  Return in about 6 months (around 5/21/2018) for Follow up. Prescriptions Ordered:  No orders of the defined types were placed in this encounter.      Orders Placed:  No orders of the

## 2017-11-28 ENCOUNTER — TELEPHONE (OUTPATIENT)
Dept: FAMILY MEDICINE CLINIC | Age: 42
End: 2017-11-28

## 2017-11-28 NOTE — TELEPHONE ENCOUNTER
Stacey Rae from [facility] [phone] asking if his OV note from 10/5/17 can reflect the need for CPAP. Per Stacey aRe needs to state he will benefit from using a CPAP machine due to HX of AIDEN for his insurance to pay for.     OV notes need to be faxed to [phone]

## 2017-12-13 ENCOUNTER — OFFICE VISIT (OUTPATIENT)
Dept: PODIATRY | Age: 42
End: 2017-12-13
Payer: MEDICAID

## 2017-12-13 VITALS
DIASTOLIC BLOOD PRESSURE: 74 MMHG | RESPIRATION RATE: 18 BRPM | HEART RATE: 81 BPM | WEIGHT: 226 LBS | HEIGHT: 66 IN | BODY MASS INDEX: 36.32 KG/M2 | SYSTOLIC BLOOD PRESSURE: 120 MMHG

## 2017-12-13 DIAGNOSIS — M79.605 BILATERAL LOWER EXTREMITY PAIN: ICD-10-CM

## 2017-12-13 DIAGNOSIS — M79.604 BILATERAL LOWER EXTREMITY PAIN: ICD-10-CM

## 2017-12-13 DIAGNOSIS — D23.71 BENIGN NEOPLASM OF SKIN OF RIGHT LOWER EXTREMITY, INCLUDING HIP: Primary | ICD-10-CM

## 2017-12-13 DIAGNOSIS — D23.72 BENIGN NEOPLASM OF SKIN OF LEFT LOWER EXTREMITY, INCLUDING HIP: ICD-10-CM

## 2017-12-13 PROCEDURE — G8417 CALC BMI ABV UP PARAM F/U: HCPCS | Performed by: PODIATRIST

## 2017-12-13 PROCEDURE — G8427 DOCREV CUR MEDS BY ELIG CLIN: HCPCS | Performed by: PODIATRIST

## 2017-12-13 PROCEDURE — 17110 DESTRUCTION B9 LES UP TO 14: CPT | Performed by: PODIATRIST

## 2017-12-13 PROCEDURE — G8484 FLU IMMUNIZE NO ADMIN: HCPCS | Performed by: PODIATRIST

## 2017-12-13 PROCEDURE — 4004F PT TOBACCO SCREEN RCVD TLK: CPT | Performed by: PODIATRIST

## 2017-12-13 NOTE — PROGRESS NOTES
[facility] Podiatry  Progress Note    Gus Guillaume is a 36 y.o. male with the chief complaint of painful lesions on his   feet. . They have been present for 2 month(s) duration. The lesions are present on the bilateral foot. The patient has 2 lesion that is deep seated and has a painful core. Treatment has included previous podiatry treatement    Review of Systems    Lower extremity physical exam:    Vascular: Pedal pulses are palpable. No edema or varicosities. Neurology: Sensation is normal. Reflexes are within normal limits. Orthopedic: Joint range of motion is normal. Muscle strength is normal.Osseous prominence noted in association with hyperkeratosis. Decrease fat pad noted. Pain to palpation to prominence and lesion. Dermatology: Inspection of skin and all tissues are normal. Nails are normal. Benign soft tissue lesion with a central core and petchaie to jhonny planter feet in 2 total spots. The lesions are painful to palpation    Assessment    1. Benign neoplasm of skin of right lower extremity, including hip  MT DESTRUCTION BENIGN LESIONS UP TO 14   2. Benign neoplasm of skin of left lower extremity, including hip  MT DESTRUCTION BENIGN LESIONS UP TO 14   3. Bilateral lower extremity pain  MT DESTRUCTION BENIGN LESIONS UP TO 14       Plan:  The lesion was partially debrided and silver nitrate was applied with an apperature pad under occlusion. The patient will leave in place for 24-48 hours and than remove. The patient tolerated the procedure well and without complication.    Pt will follow up in 9 weeks     Electronically signed by Stuart Wynn on 12/13/2017 at 2:28 PM  12/13/2017

## 2017-12-16 ENCOUNTER — HOSPITAL ENCOUNTER (OUTPATIENT)
Age: 42
Setting detail: OBSERVATION
Discharge: HOME | DRG: 082 | End: 2017-12-17
Attending: EMERGENCY MEDICINE | Admitting: SURGERY
Payer: MEDICAID
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
## 2017-12-16 DIAGNOSIS — R40.20 LOSS OF CONSCIOUSNESS (HCC): ICD-10-CM

## 2017-12-16 DIAGNOSIS — F10.920 ACUTE ALCOHOLIC INTOXICATION WITHOUT COMPLICATION (HCC): Primary | ICD-10-CM

## 2017-12-16 DIAGNOSIS — S02.85XA CLOSED FRACTURE OF ORBITAL WALL, INITIAL ENCOUNTER (HCC): ICD-10-CM

## 2017-12-16 LAB
ABSOLUTE EOS #: 0.14 K/UL (ref 0–0.44)
ABSOLUTE IMMATURE GRANULOCYTE: 0.04 K/UL (ref 0–0.3)
ABSOLUTE LYMPH #: 3.82 K/UL (ref 1.1–3.7)
ABSOLUTE MONO #: 0.7 K/UL (ref 0.1–1.2)
AMPHETAMINE SCREEN URINE: NEGATIVE
ANION GAP SERPL CALCULATED.3IONS-SCNC: 18 MMOL/L (ref 9–17)
BARBITURATE SCREEN URINE: NEGATIVE
BASOPHILS # BLD: 1 % (ref 0–2)
BASOPHILS ABSOLUTE: 0.05 K/UL (ref 0–0.2)
BENZODIAZEPINE SCREEN, URINE: NEGATIVE
BUN BLDV-MCNC: 12 MG/DL (ref 6–20)
BUN/CREAT BLD: ABNORMAL (ref 9–20)
BUPRENORPHINE URINE: NORMAL
CALCIUM SERPL-MCNC: 8.6 MG/DL (ref 8.6–10.4)
CANNABINOID SCREEN URINE: NEGATIVE
CHLORIDE BLD-SCNC: 96 MMOL/L (ref 98–107)
CO2: 23 MMOL/L (ref 20–31)
COCAINE METABOLITE, URINE: NEGATIVE
CREAT SERPL-MCNC: 1.02 MG/DL (ref 0.7–1.2)
DIFFERENTIAL TYPE: ABNORMAL
EOSINOPHILS RELATIVE PERCENT: 2 % (ref 1–4)
ETHANOL PERCENT: 0.43 %
ETHANOL: 427 MG/DL
GFR AFRICAN AMERICAN: >60 ML/MIN
GFR NON-AFRICAN AMERICAN: >60 ML/MIN
GFR SERPL CREATININE-BSD FRML MDRD: ABNORMAL ML/MIN/{1.73_M2}
GFR SERPL CREATININE-BSD FRML MDRD: ABNORMAL ML/MIN/{1.73_M2}
GLUCOSE BLD-MCNC: 126 MG/DL (ref 70–99)
HCT VFR BLD CALC: 43.3 % (ref 40.7–50.3)
HEMOGLOBIN: 14.1 G/DL (ref 13–17)
IMMATURE GRANULOCYTES: 1 %
LYMPHOCYTES # BLD: 45 % (ref 24–43)
MCH RBC QN AUTO: 27.6 PG (ref 25.2–33.5)
MCHC RBC AUTO-ENTMCNC: 32.6 G/DL (ref 28.4–34.8)
MCV RBC AUTO: 84.9 FL (ref 82.6–102.9)
MDMA URINE: NORMAL
METHADONE SCREEN, URINE: NEGATIVE
METHAMPHETAMINE, URINE: NORMAL
MONOCYTES # BLD: 9 % (ref 3–12)
OPIATES, URINE: NEGATIVE
OXYCODONE SCREEN URINE: NEGATIVE
PDW BLD-RTO: 15.2 % (ref 11.8–14.4)
PHENCYCLIDINE, URINE: NEGATIVE
PLATELET # BLD: 235 K/UL (ref 138–453)
PLATELET ESTIMATE: ABNORMAL
PMV BLD AUTO: 9.3 FL (ref 8.1–13.5)
POTASSIUM SERPL-SCNC: 3.8 MMOL/L (ref 3.7–5.3)
PROPOXYPHENE, URINE: NORMAL
RBC # BLD: 5.1 M/UL (ref 4.21–5.77)
RBC # BLD: ABNORMAL 10*6/UL
SEG NEUTROPHILS: 42 % (ref 36–65)
SEGMENTED NEUTROPHILS ABSOLUTE COUNT: 3.46 K/UL (ref 1.5–8.1)
SODIUM BLD-SCNC: 137 MMOL/L (ref 135–144)
TEST INFORMATION: NORMAL
TRICYCLIC ANTIDEPRESSANTS, UR: NORMAL
WBC # BLD: 8.2 K/UL (ref 3.5–11.3)
WBC # BLD: ABNORMAL 10*3/UL

## 2017-12-16 PROCEDURE — 2580000003 HC RX 258: Performed by: EMERGENCY MEDICINE

## 2017-12-16 PROCEDURE — 6360000002 HC RX W HCPCS: Performed by: EMERGENCY MEDICINE

## 2017-12-16 PROCEDURE — G0378 HOSPITAL OBSERVATION PER HR: HCPCS

## 2017-12-16 PROCEDURE — 12013 RPR F/E/E/N/L/M 2.6-5.0 CM: CPT

## 2017-12-16 PROCEDURE — 6360000002 HC RX W HCPCS: Performed by: STUDENT IN AN ORGANIZED HEALTH CARE EDUCATION/TRAINING PROGRAM

## 2017-12-16 PROCEDURE — G0480 DRUG TEST DEF 1-7 CLASSES: HCPCS

## 2017-12-16 PROCEDURE — 90471 IMMUNIZATION ADMIN: CPT | Performed by: STUDENT IN AN ORGANIZED HEALTH CARE EDUCATION/TRAINING PROGRAM

## 2017-12-16 PROCEDURE — 96365 THER/PROPH/DIAG IV INF INIT: CPT

## 2017-12-16 PROCEDURE — 99285 EMERGENCY DEPT VISIT HI MDM: CPT

## 2017-12-16 PROCEDURE — 2500000003 HC RX 250 WO HCPCS: Performed by: STUDENT IN AN ORGANIZED HEALTH CARE EDUCATION/TRAINING PROGRAM

## 2017-12-16 PROCEDURE — 80307 DRUG TEST PRSMV CHEM ANLYZR: CPT

## 2017-12-16 PROCEDURE — 85025 COMPLETE CBC W/AUTO DIFF WBC: CPT

## 2017-12-16 PROCEDURE — 80048 BASIC METABOLIC PNL TOTAL CA: CPT

## 2017-12-16 PROCEDURE — 2580000003 HC RX 258: Performed by: STUDENT IN AN ORGANIZED HEALTH CARE EDUCATION/TRAINING PROGRAM

## 2017-12-16 PROCEDURE — 90715 TDAP VACCINE 7 YRS/> IM: CPT | Performed by: STUDENT IN AN ORGANIZED HEALTH CARE EDUCATION/TRAINING PROGRAM

## 2017-12-16 RX ORDER — 0.9 % SODIUM CHLORIDE 0.9 %
500 INTRAVENOUS SOLUTION INTRAVENOUS ONCE
Status: COMPLETED | OUTPATIENT
Start: 2017-12-16 | End: 2017-12-17

## 2017-12-16 RX ORDER — 0.9 % SODIUM CHLORIDE 0.9 %
500 INTRAVENOUS SOLUTION INTRAVENOUS ONCE
Status: DISCONTINUED | OUTPATIENT
Start: 2017-12-16 | End: 2017-12-16

## 2017-12-16 RX ORDER — LIDOCAINE HYDROCHLORIDE AND EPINEPHRINE 10; 10 MG/ML; UG/ML
20 INJECTION, SOLUTION INFILTRATION; PERINEURAL ONCE
Status: DISCONTINUED | OUTPATIENT
Start: 2017-12-16 | End: 2017-12-16

## 2017-12-16 RX ADMIN — TETANUS TOXOID, REDUCED DIPHTHERIA TOXOID AND ACELLULAR PERTUSSIS VACCINE, ADSORBED 0.5 ML: 5; 2.5; 8; 8; 2.5 SUSPENSION INTRAMUSCULAR at 21:44

## 2017-12-16 RX ADMIN — LIDOCAINE HYDROCHLORIDE 10 ML: 10; .005 INJECTION, SOLUTION EPIDURAL; INFILTRATION; INTRACAUDAL; PERINEURAL at 22:15

## 2017-12-16 RX ADMIN — SODIUM CHLORIDE 500 ML: 9 INJECTION, SOLUTION INTRAVENOUS at 21:47

## 2017-12-16 RX ADMIN — AMPICILLIN SODIUM AND SULBACTAM SODIUM 1.5 G: 1; .5 INJECTION, POWDER, FOR SOLUTION INTRAMUSCULAR; INTRAVENOUS at 22:39

## 2017-12-16 ASSESSMENT — PAIN DESCRIPTION - LOCATION: LOCATION: FACE

## 2017-12-16 ASSESSMENT — ENCOUNTER SYMPTOMS
BACK PAIN: 0
SHORTNESS OF BREATH: 0
VOMITING: 0
ALLERGIC/IMMUNOLOGIC NEGATIVE: 1
ABDOMINAL PAIN: 0
FACIAL SWELLING: 1
CHEST TIGHTNESS: 0
PHOTOPHOBIA: 0
EYE PAIN: 1

## 2017-12-16 ASSESSMENT — PAIN DESCRIPTION - PAIN TYPE: TYPE: ACUTE PAIN

## 2017-12-16 ASSESSMENT — PAIN SCALES - GENERAL: PAINLEVEL_OUTOF10: 8

## 2017-12-17 ENCOUNTER — APPOINTMENT (OUTPATIENT)
Dept: CT IMAGING | Age: 42
DRG: 082 | End: 2017-12-17
Payer: MEDICAID

## 2017-12-17 VITALS
BODY MASS INDEX: 36.32 KG/M2 | DIASTOLIC BLOOD PRESSURE: 80 MMHG | RESPIRATION RATE: 19 BRPM | TEMPERATURE: 98.8 F | WEIGHT: 226 LBS | HEIGHT: 66 IN | OXYGEN SATURATION: 97 % | HEART RATE: 103 BPM | SYSTOLIC BLOOD PRESSURE: 137 MMHG

## 2017-12-17 PROBLEM — F10.929 ACUTE ALCOHOL INTOXICATION (HCC): Status: ACTIVE | Noted: 2017-12-17

## 2017-12-17 PROBLEM — S02.85XA ORBITAL WALL FRACTURE (HCC): Status: ACTIVE | Noted: 2017-12-17

## 2017-12-17 PROBLEM — S02.31XA FRACTURE OF RIGHT ORBITAL FLOOR (HCC): Status: ACTIVE | Noted: 2017-12-17

## 2017-12-17 PROBLEM — H05.20 EXOPHTHALMOS OF RIGHT EYE: Status: ACTIVE | Noted: 2017-12-17

## 2017-12-17 LAB
ABSOLUTE EOS #: 0.11 K/UL (ref 0–0.44)
ABSOLUTE IMMATURE GRANULOCYTE: 0.04 K/UL (ref 0–0.3)
ABSOLUTE LYMPH #: 2.99 K/UL (ref 1.1–3.7)
ABSOLUTE MONO #: 0.69 K/UL (ref 0.1–1.2)
ANION GAP SERPL CALCULATED.3IONS-SCNC: 16 MMOL/L (ref 9–17)
BASOPHILS # BLD: 1 % (ref 0–2)
BASOPHILS ABSOLUTE: 0.06 K/UL (ref 0–0.2)
BUN BLDV-MCNC: 10 MG/DL (ref 6–20)
BUN/CREAT BLD: ABNORMAL (ref 9–20)
CALCIUM SERPL-MCNC: 7.8 MG/DL (ref 8.6–10.4)
CHLORIDE BLD-SCNC: 103 MMOL/L (ref 98–107)
CO2: 24 MMOL/L (ref 20–31)
CREAT SERPL-MCNC: 0.8 MG/DL (ref 0.7–1.2)
DIFFERENTIAL TYPE: ABNORMAL
EOSINOPHILS RELATIVE PERCENT: 1 % (ref 1–4)
GFR AFRICAN AMERICAN: >60 ML/MIN
GFR NON-AFRICAN AMERICAN: >60 ML/MIN
GFR SERPL CREATININE-BSD FRML MDRD: ABNORMAL ML/MIN/{1.73_M2}
GFR SERPL CREATININE-BSD FRML MDRD: ABNORMAL ML/MIN/{1.73_M2}
GLUCOSE BLD-MCNC: 108 MG/DL (ref 70–99)
HCT VFR BLD CALC: 40.4 % (ref 40.7–50.3)
HEMOGLOBIN: 13 G/DL (ref 13–17)
IMMATURE GRANULOCYTES: 0 %
LYMPHOCYTES # BLD: 33 % (ref 24–43)
MCH RBC QN AUTO: 27.8 PG (ref 25.2–33.5)
MCHC RBC AUTO-ENTMCNC: 32.2 G/DL (ref 28.4–34.8)
MCV RBC AUTO: 86.3 FL (ref 82.6–102.9)
MONOCYTES # BLD: 8 % (ref 3–12)
PDW BLD-RTO: 15.7 % (ref 11.8–14.4)
PLATELET # BLD: 199 K/UL (ref 138–453)
PLATELET ESTIMATE: ABNORMAL
PMV BLD AUTO: 9.2 FL (ref 8.1–13.5)
POTASSIUM SERPL-SCNC: 4 MMOL/L (ref 3.7–5.3)
RBC # BLD: 4.68 M/UL (ref 4.21–5.77)
RBC # BLD: ABNORMAL 10*6/UL
SEG NEUTROPHILS: 57 % (ref 36–65)
SEGMENTED NEUTROPHILS ABSOLUTE COUNT: 5.32 K/UL (ref 1.5–8.1)
SODIUM BLD-SCNC: 143 MMOL/L (ref 135–144)
WBC # BLD: 9.2 K/UL (ref 3.5–11.3)
WBC # BLD: ABNORMAL 10*3/UL

## 2017-12-17 PROCEDURE — G0378 HOSPITAL OBSERVATION PER HR: HCPCS

## 2017-12-17 PROCEDURE — 94762 N-INVAS EAR/PLS OXIMTRY CONT: CPT

## 2017-12-17 PROCEDURE — 2060000000 HC ICU INTERMEDIATE R&B

## 2017-12-17 PROCEDURE — 97116 GAIT TRAINING THERAPY: CPT

## 2017-12-17 PROCEDURE — 70450 CT HEAD/BRAIN W/O DYE: CPT

## 2017-12-17 PROCEDURE — G8979 MOBILITY GOAL STATUS: HCPCS

## 2017-12-17 PROCEDURE — G8978 MOBILITY CURRENT STATUS: HCPCS

## 2017-12-17 PROCEDURE — 2500000003 HC RX 250 WO HCPCS: Performed by: EMERGENCY MEDICINE

## 2017-12-17 PROCEDURE — 6360000002 HC RX W HCPCS

## 2017-12-17 PROCEDURE — 80048 BASIC METABOLIC PNL TOTAL CA: CPT

## 2017-12-17 PROCEDURE — 97161 PT EVAL LOW COMPLEX 20 MIN: CPT

## 2017-12-17 PROCEDURE — 2580000003 HC RX 258: Performed by: EMERGENCY MEDICINE

## 2017-12-17 PROCEDURE — G9168 MEMORY CURRENT STATUS: HCPCS

## 2017-12-17 PROCEDURE — S0028 INJECTION, FAMOTIDINE, 20 MG: HCPCS | Performed by: EMERGENCY MEDICINE

## 2017-12-17 PROCEDURE — 72125 CT NECK SPINE W/O DYE: CPT

## 2017-12-17 PROCEDURE — 85025 COMPLETE CBC W/AUTO DIFF WBC: CPT

## 2017-12-17 PROCEDURE — 36415 COLL VENOUS BLD VENIPUNCTURE: CPT

## 2017-12-17 PROCEDURE — G9169 MEMORY GOAL STATUS: HCPCS

## 2017-12-17 PROCEDURE — 96375 TX/PRO/DX INJ NEW DRUG ADDON: CPT

## 2017-12-17 RX ORDER — FENTANYL CITRATE 50 UG/ML
50 INJECTION, SOLUTION INTRAMUSCULAR; INTRAVENOUS
Status: DISCONTINUED | OUTPATIENT
Start: 2017-12-17 | End: 2017-12-17 | Stop reason: HOSPADM

## 2017-12-17 RX ORDER — SODIUM CHLORIDE 0.9 % (FLUSH) 0.9 %
10 SYRINGE (ML) INJECTION PRN
Status: DISCONTINUED | OUTPATIENT
Start: 2017-12-17 | End: 2017-12-17 | Stop reason: HOSPADM

## 2017-12-17 RX ORDER — SODIUM CHLORIDE 0.9 % (FLUSH) 0.9 %
10 SYRINGE (ML) INJECTION EVERY 12 HOURS SCHEDULED
Status: DISCONTINUED | OUTPATIENT
Start: 2017-12-17 | End: 2017-12-17 | Stop reason: HOSPADM

## 2017-12-17 RX ORDER — DOCUSATE SODIUM 100 MG/1
100 CAPSULE, LIQUID FILLED ORAL 2 TIMES DAILY PRN
Qty: 16 CAPSULE | Refills: 0 | Status: SHIPPED | OUTPATIENT
Start: 2017-12-17 | End: 2018-01-02

## 2017-12-17 RX ORDER — ACETAMINOPHEN 325 MG/1
650 TABLET ORAL EVERY 4 HOURS PRN
Status: DISCONTINUED | OUTPATIENT
Start: 2017-12-17 | End: 2017-12-17 | Stop reason: HOSPADM

## 2017-12-17 RX ORDER — ONDANSETRON 2 MG/ML
4 INJECTION INTRAMUSCULAR; INTRAVENOUS EVERY 6 HOURS PRN
Status: DISCONTINUED | OUTPATIENT
Start: 2017-12-17 | End: 2017-12-17 | Stop reason: HOSPADM

## 2017-12-17 RX ORDER — FENTANYL CITRATE 50 UG/ML
INJECTION, SOLUTION INTRAMUSCULAR; INTRAVENOUS
Status: COMPLETED
Start: 2017-12-17 | End: 2017-12-17

## 2017-12-17 RX ORDER — AMOXICILLIN AND CLAVULANATE POTASSIUM 500; 125 MG/1; MG/1
1 TABLET, FILM COATED ORAL 3 TIMES DAILY
Qty: 21 TABLET | Refills: 0 | Status: SHIPPED | OUTPATIENT
Start: 2017-12-17 | End: 2017-12-24

## 2017-12-17 RX ORDER — OXYCODONE HYDROCHLORIDE AND ACETAMINOPHEN 5; 325 MG/1; MG/1
1 TABLET ORAL EVERY 6 HOURS PRN
Qty: 5 TABLET | Refills: 0 | Status: SHIPPED | OUTPATIENT
Start: 2017-12-17 | End: 2017-12-24

## 2017-12-17 RX ORDER — SODIUM CHLORIDE 9 MG/ML
INJECTION, SOLUTION INTRAVENOUS CONTINUOUS
Status: DISCONTINUED | OUTPATIENT
Start: 2017-12-17 | End: 2017-12-17

## 2017-12-17 RX ADMIN — FAMOTIDINE 20 MG: 10 INJECTION, SOLUTION INTRAVENOUS at 08:16

## 2017-12-17 RX ADMIN — FENTANYL CITRATE 50 MCG: 50 INJECTION, SOLUTION INTRAMUSCULAR; INTRAVENOUS at 03:16

## 2017-12-17 RX ADMIN — SODIUM CHLORIDE: 9 INJECTION, SOLUTION INTRAVENOUS at 03:15

## 2017-12-17 RX ADMIN — FENTANYL CITRATE 50 MCG: 50 INJECTION INTRAMUSCULAR; INTRAVENOUS at 03:16

## 2017-12-17 RX ADMIN — SODIUM CHLORIDE: 9 INJECTION, SOLUTION INTRAVENOUS at 10:50

## 2017-12-17 ASSESSMENT — PAIN SCALES - GENERAL
PAINLEVEL_OUTOF10: 0
PAINLEVEL_OUTOF10: 7
PAINLEVEL_OUTOF10: 8
PAINLEVEL_OUTOF10: 8

## 2017-12-17 ASSESSMENT — PAIN DESCRIPTION - ORIENTATION
ORIENTATION: RIGHT
ORIENTATION: RIGHT

## 2017-12-17 ASSESSMENT — PAIN DESCRIPTION - PAIN TYPE
TYPE: ACUTE PAIN
TYPE: ACUTE PAIN

## 2017-12-17 ASSESSMENT — PAIN DESCRIPTION - LOCATION
LOCATION: EYE
LOCATION: EYE

## 2017-12-17 NOTE — CONSULTS
Clear No Floaters No Vitreous Hemorrhage       Fundi: Dilate  OU    Disc:   OD:Pink Flat sharp margins. Cup/Disc Ratio 0.2  OS:Pink Flat sharp margins. Cup/Disc Ratio  0.3          . Vessels:   OD: Normal in size and distribution   OS: Normal in size and distribution      Macula:   OD Normal in appearance  OD Normal in appearance     Retina:   OD Fully attached in all quadrants. OS Fully attached in all quadrants    Retina Periphery:   OD: No holes or tears or retina detachment. OS: No holes or tears or retina detachment. Impression & Recommendations:  1. Closed RIght orbital Floor & medial wall blowout Fracture No EOM entrapment. 2. Left orbital medial wall Fracture    Cold compress to right eye. Cover with oral antibiotics and follow up  in office prn. Thanks    Samantha Mack MD FACS.
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
## 2017-12-17 NOTE — ED NOTES
Pt resting in bed. Family at bedside. Bottom lip inter sutures placed. Awaiting CT to flush right eye brown and suture external lip. Will continue to monitor.         Muriel Tsang RN  12/17/17 0000
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
## 2017-12-17 NOTE — CARE COORDINATION
Discharge [facility] Case Management Department  Written by: Romeo Beverly RN    Patient Name: Ole Chanel  Attending Provider: Donte Ramirez MD  Admit Date: 2017  9:04 PM  MRN: [id number]  Account: [de-identified]                     : 1975  Discharge Date:       Disposition: home independently.      Romeo Beverly RN
wounds.         Electronically signed by Akiko Sousa RN on 12/17/17 at 10:06 AM
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
## 2017-12-18 ENCOUNTER — TELEPHONE (OUTPATIENT)
Dept: INTERNAL MEDICINE | Age: 42
End: 2017-12-18

## 2017-12-22 ENCOUNTER — HOSPITAL ENCOUNTER (EMERGENCY)
Age: 42
Discharge: HOME | End: 2017-12-22
Attending: EMERGENCY MEDICINE
Payer: MEDICAID

## 2017-12-22 VITALS
SYSTOLIC BLOOD PRESSURE: 144 MMHG | OXYGEN SATURATION: 98 % | TEMPERATURE: 97.7 F | RESPIRATION RATE: 18 BRPM | HEART RATE: 91 BPM | DIASTOLIC BLOOD PRESSURE: 84 MMHG

## 2017-12-22 DIAGNOSIS — H57.11 ACUTE RIGHT EYE PAIN: Primary | ICD-10-CM

## 2017-12-22 PROCEDURE — 99283 EMERGENCY DEPT VISIT LOW MDM: CPT

## 2017-12-22 PROCEDURE — 6370000000 HC RX 637 (ALT 250 FOR IP): Performed by: EMERGENCY MEDICINE

## 2017-12-22 RX ORDER — SULFACETAMIDE SODIUM 100 MG/ML
2 SOLUTION/ DROPS OPHTHALMIC
Qty: 5 ML | Refills: 0 | Status: SHIPPED | OUTPATIENT
Start: 2017-12-22 | End: 2017-12-29

## 2017-12-22 RX ORDER — TETRACAINE HYDROCHLORIDE 5 MG/ML
1 SOLUTION OPHTHALMIC ONCE
Status: COMPLETED | OUTPATIENT
Start: 2017-12-22 | End: 2017-12-22

## 2017-12-22 RX ORDER — OXYCODONE HYDROCHLORIDE AND ACETAMINOPHEN 5; 325 MG/1; MG/1
1 TABLET ORAL ONCE
Status: COMPLETED | OUTPATIENT
Start: 2017-12-22 | End: 2017-12-22

## 2017-12-22 RX ADMIN — FLUORESCEIN SODIUM 1 MG: 1 STRIP OPHTHALMIC at 01:27

## 2017-12-22 RX ADMIN — TETRACAINE HYDROCHLORIDE 1 DROP: 5 SOLUTION OPHTHALMIC at 01:28

## 2017-12-22 RX ADMIN — OXYCODONE HYDROCHLORIDE AND ACETAMINOPHEN 1 TABLET: 5; 325 TABLET ORAL at 02:10

## 2017-12-22 ASSESSMENT — PAIN DESCRIPTION - ORIENTATION: ORIENTATION: RIGHT

## 2017-12-22 ASSESSMENT — VISUAL ACUITY
OS: 20/30
OU: 20/25
OD: 20/30

## 2017-12-22 ASSESSMENT — PAIN SCALES - GENERAL
PAINLEVEL_OUTOF10: 8
PAINLEVEL_OUTOF10: 8

## 2017-12-22 ASSESSMENT — ENCOUNTER SYMPTOMS
COUGH: 0
RHINORRHEA: 0
VOMITING: 0
NAUSEA: 0
ABDOMINAL PAIN: 0
EYE PAIN: 1
PHOTOPHOBIA: 0
SORE THROAT: 0

## 2017-12-22 ASSESSMENT — PAIN DESCRIPTION - FREQUENCY: FREQUENCY: CONTINUOUS

## 2017-12-22 ASSESSMENT — PAIN DESCRIPTION - LOCATION: LOCATION: EYE

## 2017-12-22 ASSESSMENT — PAIN DESCRIPTION - DESCRIPTORS: DESCRIPTORS: TIGHTNESS;PRESSURE
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
## 2018-01-05 ENCOUNTER — OFFICE VISIT (OUTPATIENT)
Dept: FAMILY MEDICINE CLINIC | Age: 43
End: 2018-01-05
Payer: MEDICAID

## 2018-01-05 VITALS
SYSTOLIC BLOOD PRESSURE: 142 MMHG | HEIGHT: 66 IN | HEART RATE: 85 BPM | TEMPERATURE: 98.5 F | BODY MASS INDEX: 36.8 KG/M2 | RESPIRATION RATE: 20 BRPM | OXYGEN SATURATION: 98 % | DIASTOLIC BLOOD PRESSURE: 94 MMHG | WEIGHT: 229 LBS

## 2018-01-05 DIAGNOSIS — R73.03 PRE-DIABETES: Primary | ICD-10-CM

## 2018-01-05 DIAGNOSIS — G47.33 OSA (OBSTRUCTIVE SLEEP APNEA): ICD-10-CM

## 2018-01-05 DIAGNOSIS — I10 ESSENTIAL HYPERTENSION: ICD-10-CM

## 2018-01-05 PROCEDURE — G8484 FLU IMMUNIZE NO ADMIN: HCPCS | Performed by: NURSE PRACTITIONER

## 2018-01-05 PROCEDURE — 4004F PT TOBACCO SCREEN RCVD TLK: CPT | Performed by: NURSE PRACTITIONER

## 2018-01-05 PROCEDURE — G8417 CALC BMI ABV UP PARAM F/U: HCPCS | Performed by: NURSE PRACTITIONER

## 2018-01-05 PROCEDURE — G8427 DOCREV CUR MEDS BY ELIG CLIN: HCPCS | Performed by: NURSE PRACTITIONER

## 2018-01-05 PROCEDURE — 99214 OFFICE O/P EST MOD 30 MIN: CPT | Performed by: NURSE PRACTITIONER

## 2018-01-05 RX ORDER — LISINOPRIL AND HYDROCHLOROTHIAZIDE 12.5; 1 MG/1; MG/1
1 TABLET ORAL DAILY
Qty: 30 TABLET | Refills: 3 | Status: SHIPPED | OUTPATIENT
Start: 2018-01-05 | End: 2018-04-10 | Stop reason: SDUPTHER

## 2018-01-05 ASSESSMENT — ENCOUNTER SYMPTOMS
EYE PAIN: 1
COUGH: 0
EYE REDNESS: 0
SHORTNESS OF BREATH: 0

## 2018-01-05 NOTE — PROGRESS NOTES
12/22/2014    normal    CYSTOSCOPY  03/21/2017    retrograde    CYSTOSCOPY Bilateral 3/21/2017    [facility] [address] Left 03/03/2017    left foot 5th digit derotational arthroplasty     Family History   Problem Relation Age of Onset    High Blood Pressure Mother     Diabetes Father      Social History   Substance Use Topics    Smoking status: Current Some Day Smoker     Types: Cigars    Smokeless tobacco: Never Used      Comment: occassionally    Alcohol use 2.5 oz/week     5 Standard drinks or equivalent per week      Comment: weekends/ 6 drinks      No Known Allergies    Subjective:      Review of Systems   Constitutional: Negative for chills and fever. Eyes: Positive for pain and visual disturbance (occassionally, following with eye dr). Negative for redness. Respiratory: Negative for cough and shortness of breath. Cardiovascular: Negative for chest pain, palpitations and leg swelling. Other pertinent ROS in HPI  Objective:     BP (!) 142/94 (Site: Left Arm, Position: Sitting, Cuff Size: Medium Adult)   Pulse 85   Temp 98.5 °F (36.9 °C) (Oral)   Resp 20   Ht 5' 5.98\" (1.676 m)   Wt 229 lb (103.9 kg)   SpO2 98%   BMI 36.98 kg/m²    Physical Exam   Constitutional: He is oriented to person, place, and time. He appears well-developed and well-nourished. HENT:   Head: Normocephalic and atraumatic. Right Ear: External ear normal.   Left Ear: External ear normal.   Nose: Nose normal.   Mouth/Throat: Oropharynx is clear and moist.   Eyes: Conjunctivae and EOM are normal. Pupils are equal, round, and reactive to light. Neck: Normal range of motion. Cardiovascular: Normal rate, regular rhythm and normal heart sounds. Pulmonary/Chest: Effort normal and breath sounds normal.   Abdominal: Soft. Bowel sounds are normal.   Musculoskeletal: Normal range of motion.    Pain free ROM     Neurological: He is alert and oriented

## 2018-02-02 ENCOUNTER — HOSPITAL ENCOUNTER (OUTPATIENT)
Age: 43
Setting detail: SPECIMEN
Discharge: HOME | End: 2018-02-02
Payer: MEDICAID

## 2018-02-02 DIAGNOSIS — R73.03 PRE-DIABETES: ICD-10-CM

## 2018-02-02 LAB
CHOLESTEROL/HDL RATIO: 4.3
CHOLESTEROL: 167 MG/DL
ESTIMATED AVERAGE GLUCOSE: 131 MG/DL
HBA1C MFR BLD: 6.2 % (ref 4–6)
HDLC SERPL-MCNC: 39 MG/DL
LDL CHOLESTEROL: 97 MG/DL (ref 0–130)
TRIGL SERPL-MCNC: 156 MG/DL
VLDLC SERPL CALC-MCNC: ABNORMAL MG/DL (ref 1–30)

## 2018-02-09 ENCOUNTER — OFFICE VISIT (OUTPATIENT)
Dept: FAMILY MEDICINE CLINIC | Age: 43
End: 2018-02-09
Payer: MEDICAID

## 2018-02-09 VITALS
DIASTOLIC BLOOD PRESSURE: 86 MMHG | OXYGEN SATURATION: 99 % | HEIGHT: 66 IN | TEMPERATURE: 98.2 F | SYSTOLIC BLOOD PRESSURE: 132 MMHG | HEART RATE: 86 BPM | RESPIRATION RATE: 20 BRPM | BODY MASS INDEX: 36.96 KG/M2 | WEIGHT: 230 LBS

## 2018-02-09 DIAGNOSIS — Z86.69 H/O SLEEP APNEA: Primary | ICD-10-CM

## 2018-02-09 DIAGNOSIS — R73.03 PRE-DIABETES: ICD-10-CM

## 2018-02-09 PROCEDURE — G8484 FLU IMMUNIZE NO ADMIN: HCPCS | Performed by: NURSE PRACTITIONER

## 2018-02-09 PROCEDURE — 99213 OFFICE O/P EST LOW 20 MIN: CPT | Performed by: NURSE PRACTITIONER

## 2018-02-09 PROCEDURE — G8427 DOCREV CUR MEDS BY ELIG CLIN: HCPCS | Performed by: NURSE PRACTITIONER

## 2018-02-09 PROCEDURE — G8417 CALC BMI ABV UP PARAM F/U: HCPCS | Performed by: NURSE PRACTITIONER

## 2018-02-09 PROCEDURE — 4004F PT TOBACCO SCREEN RCVD TLK: CPT | Performed by: NURSE PRACTITIONER

## 2018-02-09 ASSESSMENT — PATIENT HEALTH QUESTIONNAIRE - PHQ9
SUM OF ALL RESPONSES TO PHQ QUESTIONS 1-9: 0
2. FEELING DOWN, DEPRESSED OR HOPELESS: 0
1. LITTLE INTEREST OR PLEASURE IN DOING THINGS: 0
SUM OF ALL RESPONSES TO PHQ9 QUESTIONS 1 & 2: 0

## 2018-02-09 NOTE — PROGRESS NOTES
has had a sleep study, we have tried multiple times and are unable to locate records. - Home Sleep Study; Future    2. Pre-diabetes  Pt would prefer to diet nd exercise changes prior to medications. Will rto in 3 months for re-eval.   Diabetes education provided today:    Metabolic syndrome: association of diabetes with dyslipidemia, HTN and obesity. Nutrition as a mainstream of diabetes therapy. [place] about label reading. Carbs: good carbs and bad carbs, importance of carb counting, incorporation of protein with each meal to reduce Glycemic index, importance of portions, Carb/insulin ratio. Fats: Good fats and bad fats, meal planning and supplements. Physical activity: advised to exercise 5-7 days a week 30-60 mins at least. Discussed how it affects BS readings. Different diabetic medications. RTO if symptoms worsen or fail to improve  Pt agreeable with plan      Patient given educational materials - see patient instructions. Discussed use, benefit, and side effects of prescribed medications. All patient questions answered. Pt voiced understanding. Reviewed health maintenance. Instructed to continue current medications, diet and exercise. 1.  Christa Seaman received counseling on the following healthy behaviors: nutrition, exercise and medication adherence  2. Patient given educational materials when available - see patient instructions when applicable  3. Discussed use, benefit, and side effects of prescribed medications. Barriers to medication compliance addressed. All patient questions answered. Pt voiced understanding. 4.  Reviewed prior labs and health maintenance  5. Continue current medications, diet and exercise.     Completed Refills   Requested Prescriptions      No prescriptions requested or ordered in this encounter         Electronically signed by Woo Erazo CNP on 3/12/2018 at 10:11 AM

## 2018-02-20 ENCOUNTER — OFFICE VISIT (OUTPATIENT)
Dept: PODIATRY | Age: 43
End: 2018-02-20
Payer: MEDICAID

## 2018-02-20 VITALS
HEIGHT: 65 IN | BODY MASS INDEX: 38.32 KG/M2 | HEART RATE: 88 BPM | SYSTOLIC BLOOD PRESSURE: 132 MMHG | DIASTOLIC BLOOD PRESSURE: 86 MMHG | WEIGHT: 230 LBS

## 2018-02-20 DIAGNOSIS — B35.1 DERMATOPHYTOSIS OF NAIL: ICD-10-CM

## 2018-02-20 DIAGNOSIS — D23.72 BENIGN NEOPLASM OF SKIN OF LEFT FOOT: ICD-10-CM

## 2018-02-20 DIAGNOSIS — M79.604 BILATERAL LOWER EXTREMITY PAIN: Primary | ICD-10-CM

## 2018-02-20 DIAGNOSIS — M79.605 BILATERAL LOWER EXTREMITY PAIN: Primary | ICD-10-CM

## 2018-02-20 PROCEDURE — 17110 DESTRUCTION B9 LES UP TO 14: CPT | Performed by: PODIATRIST

## 2018-02-20 PROCEDURE — 4004F PT TOBACCO SCREEN RCVD TLK: CPT | Performed by: PODIATRIST

## 2018-02-20 PROCEDURE — G8427 DOCREV CUR MEDS BY ELIG CLIN: HCPCS | Performed by: PODIATRIST

## 2018-02-20 PROCEDURE — G8484 FLU IMMUNIZE NO ADMIN: HCPCS | Performed by: PODIATRIST

## 2018-02-20 PROCEDURE — G8417 CALC BMI ABV UP PARAM F/U: HCPCS | Performed by: PODIATRIST

## 2018-02-20 PROCEDURE — 11721 DEBRIDE NAIL 6 OR MORE: CPT | Performed by: PODIATRIST

## 2018-02-20 NOTE — PROGRESS NOTES
Left    Dystrophic Changes   [x] [x] [x] [x] [x] [x] [x] [x] [x] [x]  5 4 3 2 1 1 2 3 4 5                         Right                                        Left    Color  [x] [x] [x] [x] [x] [x] [x] [x] [x] [x]  5 4 3 2 1 1 2 3 4 5                          Right                                        Left    Incurvation/Ingrowin   [] [] [] [] [] [] [] [] [] []  5 4 3 2 1 1 2 3 4 5                         Right                                        Left    Inflammation/Pain   [x] [x] [x] [x] [x] [x] [x] [x] [x] [x]  5 4 3  2 1 1 2 3 4 5                         Right                                        Left       Nails are painful 1-10 with direct palpation. Dermatologic Exam:  Skin lesion/ulceration Absent . Skin No rashes or nodules noted. .       Musculoskeletal:     1st MPJ ROM decreased, Bilateral.  Muscle strength 5/5, Bilateral.  Pain present upon palpation of toenails 1-5, Bilateral. decreased medial longitudinal arch, Bilateral.  Ankle ROM decreased,Bilateral.    Dorsally contracted digits present digits 2, Bilateral.     Vascular: DP and PT pulses palpable 1/4, Bilateral.  CFT <5 seconds, Bilateral.  Hair growth absent to the level of the digits, Bilateral.  Edema present, Bilateral.  Varicosities absent, Bilateral. Erythema absent, Bilateral    Neurological: Sensation diminshed to light touch to level of digits, Bilateral.  Protective sensation intact 6/10 sites via 5.07/10g [place] Monofilament, Bilateral.  negative Tinel's, Bilateral.  negative Valleix sign, Bilateral.      Integument: Warm, dry, supple, Bilateral.  Benign skin neoplasm with petechia at left hallux medial IPJ. Open lesion absent, Bilateral.  Interdigital maceration absent to web spaces 4, Bilateral.  Nails 1-5 left and 1-5 right thickened > 3.0 mm, dystrophic and crumbly, discolored with subungual debris. Fissures absent, Bilateral.   Assessement:  1.  Bilateral lower extremity pain  59961 - [place] DEBRIDEMENT OF NAILS, 6 OR MORE    36830 - [place] DESTRUCTION BENIGN LESIONS UP TO 14   2. Dermatophytosis of nail  91446 - [place] DEBRIDEMENT OF NAILS, 6 OR MORE   3. Benign neoplasm of skin of left foot  25819 - [place] DESTRUCTION BENIGN LESIONS UP TO 14             Plan:   Pt was evaluated and examined. Patient was given personalized discharge instructions. Nails 1-10 were debrided in length and thickness sharply with a nail nipper and  without incident. Sharp debridement of benign skin neoplasm to patient's tolerance with chemocautery using silver nitrate. Pt will follow up in 9 weeks or sooner if any problems arise. Diagnosis was discussed with the pt and all of their questions were answered in detail. Proper foot hygiene and care was discussed with the pt. Patient to check feet daily and contact the office with any questions/problems/concerns. Other comorbidity noted and will be managed by PCP. Pain waiver discussed with patient and confirmed.    2/20/2018      Electronically signed by Armando Starks DPM on 2/20/2018 at 2:27 PM  2/20/2018

## 2018-03-12 ASSESSMENT — ENCOUNTER SYMPTOMS
SHORTNESS OF BREATH: 0
EYE PAIN: 1
COUGH: 0
EYE REDNESS: 0

## 2018-03-16 DIAGNOSIS — G47.33 OSA (OBSTRUCTIVE SLEEP APNEA): Primary | ICD-10-CM

## 2018-03-27 ENCOUNTER — TELEPHONE (OUTPATIENT)
Dept: FAMILY MEDICINE CLINIC | Age: 43
End: 2018-03-27

## 2018-03-29 ENCOUNTER — TELEPHONE (OUTPATIENT)
Dept: FAMILY MEDICINE CLINIC | Age: 43
End: 2018-03-29

## 2018-04-10 ENCOUNTER — OFFICE VISIT (OUTPATIENT)
Dept: FAMILY MEDICINE CLINIC | Age: 43
End: 2018-04-10
Payer: MEDICAID

## 2018-04-10 VITALS
TEMPERATURE: 97 F | OXYGEN SATURATION: 98 % | HEIGHT: 65 IN | WEIGHT: 227 LBS | DIASTOLIC BLOOD PRESSURE: 84 MMHG | RESPIRATION RATE: 20 BRPM | HEART RATE: 89 BPM | BODY MASS INDEX: 37.82 KG/M2 | SYSTOLIC BLOOD PRESSURE: 136 MMHG

## 2018-04-10 DIAGNOSIS — Z76.0 MEDICATION REFILL: ICD-10-CM

## 2018-04-10 DIAGNOSIS — R53.83 FATIGUE, UNSPECIFIED TYPE: Primary | ICD-10-CM

## 2018-04-10 DIAGNOSIS — Z91.89 RISK FACTORS FOR OBSTRUCTIVE SLEEP APNEA: ICD-10-CM

## 2018-04-10 PROCEDURE — 99213 OFFICE O/P EST LOW 20 MIN: CPT | Performed by: NURSE PRACTITIONER

## 2018-04-10 PROCEDURE — 4004F PT TOBACCO SCREEN RCVD TLK: CPT | Performed by: NURSE PRACTITIONER

## 2018-04-10 PROCEDURE — G8427 DOCREV CUR MEDS BY ELIG CLIN: HCPCS | Performed by: NURSE PRACTITIONER

## 2018-04-10 PROCEDURE — G8417 CALC BMI ABV UP PARAM F/U: HCPCS | Performed by: NURSE PRACTITIONER

## 2018-04-10 RX ORDER — OMEPRAZOLE 20 MG/1
CAPSULE, DELAYED RELEASE ORAL
Qty: 30 CAPSULE | Refills: 5 | Status: SHIPPED | OUTPATIENT
Start: 2018-04-10 | End: 2019-09-27 | Stop reason: SDUPTHER

## 2018-04-10 RX ORDER — LISINOPRIL AND HYDROCHLOROTHIAZIDE 12.5; 1 MG/1; MG/1
1 TABLET ORAL DAILY
Qty: 30 TABLET | Refills: 5 | Status: SHIPPED | OUTPATIENT
Start: 2018-04-10 | End: 2018-11-08 | Stop reason: SDUPTHER

## 2018-04-10 RX ORDER — POTASSIUM BICARBONATE 25 MEQ/1
TABLET, EFFERVESCENT ORAL
Qty: 60 TABLET | Refills: 5 | Status: SHIPPED | OUTPATIENT
Start: 2018-04-10 | End: 2019-04-15 | Stop reason: SDUPTHER

## 2018-04-11 ENCOUNTER — PROCEDURE VISIT (OUTPATIENT)
Dept: FAMILY MEDICINE CLINIC | Age: 43
End: 2018-04-11
Payer: MEDICAID

## 2018-04-11 VITALS
WEIGHT: 227 LBS | BODY MASS INDEX: 37.82 KG/M2 | HEIGHT: 65 IN | OXYGEN SATURATION: 100 % | RESPIRATION RATE: 20 BRPM | DIASTOLIC BLOOD PRESSURE: 86 MMHG | TEMPERATURE: 98.2 F | SYSTOLIC BLOOD PRESSURE: 132 MMHG | HEART RATE: 78 BPM

## 2018-04-11 DIAGNOSIS — S60.552A SUPERFICIAL FOREIGN BODY OF LEFT HAND, INITIAL ENCOUNTER: Primary | ICD-10-CM

## 2018-04-11 PROCEDURE — G8427 DOCREV CUR MEDS BY ELIG CLIN: HCPCS | Performed by: NURSE PRACTITIONER

## 2018-04-11 PROCEDURE — 4004F PT TOBACCO SCREEN RCVD TLK: CPT | Performed by: NURSE PRACTITIONER

## 2018-04-11 PROCEDURE — 10120 INC&RMVL FB SUBQ TISS SMPL: CPT | Performed by: NURSE PRACTITIONER

## 2018-04-11 PROCEDURE — G8417 CALC BMI ABV UP PARAM F/U: HCPCS | Performed by: NURSE PRACTITIONER

## 2018-04-11 PROCEDURE — 99212 OFFICE O/P EST SF 10 MIN: CPT | Performed by: NURSE PRACTITIONER

## 2018-05-08 ENCOUNTER — OFFICE VISIT (OUTPATIENT)
Dept: FAMILY MEDICINE CLINIC | Age: 43
End: 2018-05-08
Payer: MEDICAID

## 2018-05-08 VITALS
RESPIRATION RATE: 20 BRPM | OXYGEN SATURATION: 98 % | HEIGHT: 65 IN | HEART RATE: 79 BPM | WEIGHT: 222 LBS | BODY MASS INDEX: 36.99 KG/M2 | TEMPERATURE: 97.3 F | DIASTOLIC BLOOD PRESSURE: 82 MMHG | SYSTOLIC BLOOD PRESSURE: 134 MMHG

## 2018-05-08 DIAGNOSIS — I10 ESSENTIAL HYPERTENSION: Primary | ICD-10-CM

## 2018-05-08 PROCEDURE — G8417 CALC BMI ABV UP PARAM F/U: HCPCS | Performed by: NURSE PRACTITIONER

## 2018-05-08 PROCEDURE — 4004F PT TOBACCO SCREEN RCVD TLK: CPT | Performed by: NURSE PRACTITIONER

## 2018-05-08 PROCEDURE — 99213 OFFICE O/P EST LOW 20 MIN: CPT | Performed by: NURSE PRACTITIONER

## 2018-05-08 PROCEDURE — G8427 DOCREV CUR MEDS BY ELIG CLIN: HCPCS | Performed by: NURSE PRACTITIONER

## 2018-05-08 ASSESSMENT — ENCOUNTER SYMPTOMS
COUGH: 0
CHEST TIGHTNESS: 0
DIARRHEA: 0
CONSTIPATION: 0
NAUSEA: 0
SHORTNESS OF BREATH: 0
ABDOMINAL PAIN: 0

## 2018-06-13 ENCOUNTER — OFFICE VISIT (OUTPATIENT)
Dept: PODIATRY | Age: 43
End: 2018-06-13
Payer: MEDICAID

## 2018-06-13 VITALS — HEART RATE: 72 BPM | WEIGHT: 222 LBS | HEIGHT: 65 IN | BODY MASS INDEX: 36.99 KG/M2 | RESPIRATION RATE: 16 BRPM

## 2018-06-13 DIAGNOSIS — M79.604 PAIN IN BOTH LOWER EXTREMITIES: ICD-10-CM

## 2018-06-13 DIAGNOSIS — M79.605 PAIN IN BOTH LOWER EXTREMITIES: ICD-10-CM

## 2018-06-13 DIAGNOSIS — D23.72 BENIGN NEOPLASM OF SKIN OF LEFT FOOT: Primary | ICD-10-CM

## 2018-06-13 DIAGNOSIS — B35.1 DERMATOPHYTOSIS OF NAIL: ICD-10-CM

## 2018-06-13 DIAGNOSIS — L60.0 INGROWN NAIL: ICD-10-CM

## 2018-06-13 PROCEDURE — 17110 DESTRUCTION B9 LES UP TO 14: CPT | Performed by: PODIATRIST

## 2018-06-13 PROCEDURE — G8427 DOCREV CUR MEDS BY ELIG CLIN: HCPCS | Performed by: PODIATRIST

## 2018-06-13 PROCEDURE — 11721 DEBRIDE NAIL 6 OR MORE: CPT | Performed by: PODIATRIST

## 2018-06-13 PROCEDURE — 4004F PT TOBACCO SCREEN RCVD TLK: CPT | Performed by: PODIATRIST

## 2018-06-13 PROCEDURE — G8417 CALC BMI ABV UP PARAM F/U: HCPCS | Performed by: PODIATRIST

## 2018-06-13 RX ORDER — AMMONIUM LACTATE 12 G/100G
CREAM TOPICAL
Qty: 1 BOTTLE | Refills: 4 | Status: SHIPPED | OUTPATIENT
Start: 2018-06-13 | End: 2021-01-05

## 2018-07-31 ENCOUNTER — OFFICE VISIT (OUTPATIENT)
Dept: UROLOGY | Age: 43
End: 2018-07-31
Payer: MEDICAID

## 2018-07-31 VITALS
HEART RATE: 85 BPM | HEIGHT: 67 IN | TEMPERATURE: 99.3 F | BODY MASS INDEX: 33.74 KG/M2 | DIASTOLIC BLOOD PRESSURE: 73 MMHG | SYSTOLIC BLOOD PRESSURE: 127 MMHG | WEIGHT: 215 LBS

## 2018-07-31 DIAGNOSIS — R39.12 BENIGN PROSTATIC HYPERPLASIA WITH WEAK URINARY STREAM: Primary | ICD-10-CM

## 2018-07-31 DIAGNOSIS — N40.1 BENIGN PROSTATIC HYPERPLASIA WITH WEAK URINARY STREAM: Primary | ICD-10-CM

## 2018-07-31 PROCEDURE — G8427 DOCREV CUR MEDS BY ELIG CLIN: HCPCS | Performed by: UROLOGY

## 2018-07-31 PROCEDURE — 4004F PT TOBACCO SCREEN RCVD TLK: CPT | Performed by: UROLOGY

## 2018-07-31 PROCEDURE — G8417 CALC BMI ABV UP PARAM F/U: HCPCS | Performed by: UROLOGY

## 2018-07-31 PROCEDURE — 99214 OFFICE O/P EST MOD 30 MIN: CPT | Performed by: UROLOGY

## 2018-07-31 RX ORDER — TAMSULOSIN HYDROCHLORIDE 0.4 MG/1
0.4 CAPSULE ORAL DAILY
Qty: 90 CAPSULE | Refills: 3 | Status: SHIPPED | OUTPATIENT
Start: 2018-07-31 | End: 2020-05-22

## 2018-07-31 RX ORDER — HYDROCHLOROTHIAZIDE 12.5 MG/1
CAPSULE, GELATIN COATED ORAL
COMMUNITY
End: 2019-08-22

## 2018-07-31 ASSESSMENT — ENCOUNTER SYMPTOMS
ABDOMINAL PAIN: 0
BACK PAIN: 0
NAUSEA: 0
EYE REDNESS: 0
COLOR CHANGE: 0
SHORTNESS OF BREATH: 0
EYE PAIN: 0
WHEEZING: 0
VOMITING: 0
COUGH: 0

## 2018-07-31 NOTE — PROGRESS NOTES
[facility]  [facility] [place]  [address]  [address]  [address] [facility] [address]  Dept: [address] [facility] Urology Office Note - Established    Patient:  Guicho Rdz  YOB: 1975  Date: 7/31/2018    The patient is a 36 y.o. male who presents today for evaluation of the following problems:   Chief Complaint   Patient presents with    Follow-up     OAB . doing a little better split stream        HPI  Hre for follow up on nocturia and urgency. He has cut back on his tea intake and feels things are better. He can now make it 2 hours between urinations, denies urgency, has a steady stream, occasionally weak, has nocturia 2x per night. He has had no enuresis with decreasing tea intake. Still with splayed stream weaker stream and some urgency, not on any meds currently, nocturia x 2    Summary of old records: N/A    Additional History: N/A    Procedures Today: N/A    Urinalysis today:  No results found for this visit on 07/31/18. Last several PSA's:  Lab Results   Component Value Date    PSA 0.50 07/25/2017     Last total testosterone:  No results found for: TESTOSTERONE    AUA Symptom Score (7/31/2018):   INCOMPLETE EMPTYING: How often have you had the sensation of not emptying your bladder?: Not at all  FREQUENCY: How often do you have to urinate less than every two hours?: Not at all  INTERMITTENCY: How often have you found you stopped and started again several times when you urinated?: Not at all  URGENCY: How often have you found it difficult to postpone urination?: Not at all  WEAK STREAM: How often have you had a weak urinary stream?: Not at all  STRAINING: How often have you had to strain to start  urination?: Less than 1 to 5 times  NOCTURIA: How many times did you typically get up at night to uriniate?: 2 Times  TOTAL I-PSS SCORE[de-identified] 3  How would you feel if you were to spend the rest of your life with your urinary condition?: Mixe    Last BUN and creatinine:  Lab Results   Component Value Date    BUN 10 12/17/2017     Lab Results   Component Value Date    CREATININE 0.80 12/17/2017       Additional Lab/Culture results: none    Imaging Reviewed during this Office Visit: none  (results were independently reviewed by physician and radiology report verified)    PAST MEDICAL, FAMILY AND SOCIAL HISTORY UPDATE:  Past Medical History:   Diagnosis Date    Dry skin     GERD (gastroesophageal reflux disease)     Hematuria     HLD (hyperlipidemia) 3/2/2015    Hypertension     AIDEN (obstructive sleep apnea)     Pre-diabetes     Sleep apnea     does not use a machine    Wears dentures     upper     Past Surgical History:   Procedure Laterality Date    ANKLE SURGERY Left     fracture    ARTHROPLASTY      4th and 5th toe on left foot    COLONOSCOPY  12/22/2014    normal    CYSTOSCOPY  03/21/2017    retrograde    CYSTOSCOPY Bilateral 3/21/2017    CYSTOSCOPY, RETROGRADE PYELOGRAM performed by Vikas Chaudhari MD at [place] [address]. Left 03/03/2017    left foot 5th digit derotational arthroplasty     Family History   Problem Relation Age of Onset    High Blood Pressure Mother     Diabetes Father      Outpatient Prescriptions Marked as Taking for the 7/31/18 encounter (Office Visit) with Vikas Chaudhari MD   Medication Sig Dispense Refill    hydrochlorothiazide (MICROZIDE) 12.5 MG capsule Take by mouth      tamsulosin (FLOMAX) 0.4 MG capsule Take 1 capsule by mouth daily 90 capsule 3    ammonium lactate (LAC-HYDRIN) 12 % cream Apply topically as needed.  1 Bottle 4    potassium bicarbonate (EFFER-K) 25 MEQ disintegrating tablet Dissolve two tablets into 4 to 8 oz of water daily 60 tablet 5    omeprazole (PRILOSEC) 20 MG delayed release capsule Take one tablet daily for acid indigestion and heartburn 30 capsule 5    lisinopril-hydrochlorothiazide (PRINZIDE;ZESTORETIC) 10-12.5 MG per tablet Take 1 tablet by mouth daily 30 tablet 5       Patient has no known

## 2018-08-09 ENCOUNTER — OFFICE VISIT (OUTPATIENT)
Dept: FAMILY MEDICINE CLINIC | Age: 43
End: 2018-08-09
Payer: MEDICAID

## 2018-08-09 VITALS
WEIGHT: 217 LBS | RESPIRATION RATE: 20 BRPM | BODY MASS INDEX: 34.06 KG/M2 | OXYGEN SATURATION: 97 % | HEART RATE: 87 BPM | HEIGHT: 67 IN | TEMPERATURE: 99.3 F | SYSTOLIC BLOOD PRESSURE: 124 MMHG | DIASTOLIC BLOOD PRESSURE: 78 MMHG

## 2018-08-09 DIAGNOSIS — I10 ESSENTIAL HYPERTENSION: Primary | ICD-10-CM

## 2018-08-09 DIAGNOSIS — Z91.89 RISK FACTORS FOR OBSTRUCTIVE SLEEP APNEA: ICD-10-CM

## 2018-08-09 PROCEDURE — G8427 DOCREV CUR MEDS BY ELIG CLIN: HCPCS | Performed by: NURSE PRACTITIONER

## 2018-08-09 PROCEDURE — 99213 OFFICE O/P EST LOW 20 MIN: CPT | Performed by: NURSE PRACTITIONER

## 2018-08-09 PROCEDURE — G8417 CALC BMI ABV UP PARAM F/U: HCPCS | Performed by: NURSE PRACTITIONER

## 2018-08-09 PROCEDURE — 4004F PT TOBACCO SCREEN RCVD TLK: CPT | Performed by: NURSE PRACTITIONER

## 2018-08-09 ASSESSMENT — ENCOUNTER SYMPTOMS
SHORTNESS OF BREATH: 0
DIARRHEA: 0
CHEST TIGHTNESS: 0
NAUSEA: 0
COUGH: 0
ABDOMINAL PAIN: 0
CONSTIPATION: 0

## 2018-08-09 NOTE — PROGRESS NOTES
[address] [place] [address], [address] [place] [address] Dr MALU Eddy 36 is a 36 y.o. male who presents today for his  medical conditions/complaints as noted below. Eliza Medrano is c/o of No chief complaint on file. [facility] HPI:     HPI   Pranav Harris is here for htn and face to face for jorge. H/o jorge. Pt does report that he is well rested and improved sleep when he had the machine. The machine he currently has is old and does not fit well. I feel the patient would benefit from having a new cpap machine, his current one is nearly 8years old. Allow the note to serve as a face to face for the sleep study and dme equipment. Sleep Apnea Screening Questionnaire (STOPBANG)    Do you SNORE loudly (louder than talking or loud enough to be heard through closed doors)? YES / NO: Yes  Do you often feel TIRED, fatigued, or sleepy during daytime? YES / NO: Yes  Has anyone OBSERVED you stop breathing during your sleep? YES / NO: Yes  Do you have or are you being treated for high blood PRESSURE? YES / NO: Yes  BMI more than 35kg/m2? YES / NO: No  AGE over 48years old? YES / NO: no  NECK circumference > 16 inches (40cm)? YES / NO: Yes  GENDER: Male? YES / NO: Yes    Total score      6    Hypertension: Patient here for follow-up of elevated blood pressure. He is exercising and is adherent to low salt diet. Blood pressure is well controlled at home. Cardiac symptoms none. Patient denies chest pain, chest pressure/discomfort, exertional chest pressure/discomfort, irregular heart beat, lower extremity edema and palpitations. Cardiovascular risk factors: hypertension, male gender and obesity (BMI >= 30 kg/m2). Use of agents associated with hypertension: none. History of target organ damage: none. gerd- controlled with omeprazole. Nursing note reviewed and discussed with patient.     Patient's medications, allergies, past medical, surgical, social and family histories were reviewed and updated as appropriate. Current Outpatient Prescriptions on File Prior to Visit   Medication Sig Dispense Refill    tamsulosin (FLOMAX) 0.4 MG capsule Take 1 capsule by mouth daily 90 capsule 3    ammonium lactate (LAC-HYDRIN) 12 % cream Apply topically as needed. 1 Bottle 4    potassium bicarbonate (EFFER-K) 25 MEQ disintegrating tablet Dissolve two tablets into 4 to 8 oz of water daily 60 tablet 5    omeprazole (PRILOSEC) 20 MG delayed release capsule Take one tablet daily for acid indigestion and heartburn 30 capsule 5    lisinopril-hydrochlorothiazide (PRINZIDE;ZESTORETIC) 10-12.5 MG per tablet Take 1 tablet by mouth daily 30 tablet 5    hydrochlorothiazide (MICROZIDE) 12.5 MG capsule Take by mouth       No current facility-administered medications on file prior to visit.       Past Medical History:   Diagnosis Date    Dry skin     GERD (gastroesophageal reflux disease)     Hematuria     HLD (hyperlipidemia) 3/2/2015    Hypertension     AIDEN (obstructive sleep apnea)     Pre-diabetes     Sleep apnea     does not use a machine    Wears dentures     upper      Past Surgical History:   Procedure Laterality Date    ANKLE SURGERY Left     fracture    ARTHROPLASTY      4th and 5th toe on left foot    COLONOSCOPY  12/22/2014    normal    CYSTOSCOPY  03/21/2017    retrograde    CYSTOSCOPY Bilateral 3/21/2017    CYSTOSCOPY, RETROGRADE PYELOGRAM performed by Jamil Candelaria MD at [place] [address]. Left 03/03/2017    left foot 5th digit derotational arthroplasty     Family History   Problem Relation Age of Onset    High Blood Pressure Mother     Diabetes Father      Social History   Substance Use Topics    Smoking status: Current Some Day Smoker     Types: Cigars    Smokeless tobacco: Never Used      Comment: occassionally    Alcohol use 2.5 oz/week     5 Standard drinks or equivalent per week      Comment: weekends/ 6 drinks      No Known Allergies    Subjective:      Review of Systems   Constitutional: Negative for activity change, appetite change, chills and fever. HENT: Negative for congestion, ear pain and hearing loss. Eyes: Negative for visual disturbance. Respiratory: Negative for cough, chest tightness and shortness of breath. Cardiovascular: Negative for chest pain, palpitations and leg swelling. Gastrointestinal: Negative for abdominal pain, constipation, diarrhea and nausea. Musculoskeletal: Negative for arthralgias and myalgias. Skin: Negative for rash. Other pertinent ROS in HPI  Objective:     /78 (Site: Right Arm, Position: Sitting, Cuff Size: Medium Adult)   Pulse 87   Temp 99.3 °F (37.4 °C) (Oral)   Resp 20   Ht 5' 7\" (1.702 m)   Wt 217 lb (98.4 kg)   SpO2 97%   BMI 33.99 kg/m²      Wt Readings from Last 3 Encounters:   08/09/18 217 lb (98.4 kg)   07/31/18 215 lb (97.5 kg)   06/13/18 222 lb (100.7 kg)       Physical Exam   Constitutional: He is oriented to person, place, and time. He appears well-developed and well-nourished. No distress. Cardiovascular: Normal rate, regular rhythm and normal heart sounds. Pulmonary/Chest: Effort normal and breath sounds normal.   Neurological: He is alert and oriented to person, place, and time. Skin: Skin is warm and dry. Psychiatric: He has a normal mood and affect. Assessment/PLAN     1. Essential hypertension  Continue meds  Does not need refills      2. Risk factors for obstructive sleep apnea  Will try to get paperwork completed. RTO if symptoms worsen or fail to improve  Pt agreeable with plan      Patient given educational materials - see patient instructions. Discussed use, benefit, and side effects of prescribed medications. All patient questions answered. Pt voiced understanding. Reviewed health maintenance. Instructed to continue current medications, diet and exercise.     1.  Jason Ibarra received counseling on the following

## 2018-08-15 ENCOUNTER — OFFICE VISIT (OUTPATIENT)
Dept: PODIATRY | Age: 43
End: 2018-08-15
Payer: MEDICAID

## 2018-08-15 VITALS — WEIGHT: 222 LBS | HEIGHT: 65 IN | BODY MASS INDEX: 36.99 KG/M2 | RESPIRATION RATE: 16 BRPM

## 2018-08-15 DIAGNOSIS — B35.1 DERMATOPHYTOSIS OF NAIL: Primary | ICD-10-CM

## 2018-08-15 DIAGNOSIS — L60.0 INGROWN NAIL: ICD-10-CM

## 2018-08-15 DIAGNOSIS — M79.604 PAIN IN BOTH LOWER EXTREMITIES: ICD-10-CM

## 2018-08-15 DIAGNOSIS — D23.72 BENIGN NEOPLASM OF SKIN OF LEFT FOOT: ICD-10-CM

## 2018-08-15 DIAGNOSIS — M79.605 PAIN IN BOTH LOWER EXTREMITIES: ICD-10-CM

## 2018-08-15 PROCEDURE — 11721 DEBRIDE NAIL 6 OR MORE: CPT | Performed by: PODIATRIST

## 2018-08-15 PROCEDURE — G8427 DOCREV CUR MEDS BY ELIG CLIN: HCPCS | Performed by: PODIATRIST

## 2018-08-15 PROCEDURE — 4004F PT TOBACCO SCREEN RCVD TLK: CPT | Performed by: PODIATRIST

## 2018-08-15 PROCEDURE — 17110 DESTRUCTION B9 LES UP TO 14: CPT | Performed by: PODIATRIST

## 2018-08-15 PROCEDURE — G8417 CALC BMI ABV UP PARAM F/U: HCPCS | Performed by: PODIATRIST

## 2018-08-23 ENCOUNTER — HOSPITAL ENCOUNTER (OUTPATIENT)
Dept: SLEEP CENTER | Age: 43
Discharge: HOME | End: 2018-08-25
Payer: MEDICAID

## 2018-08-23 DIAGNOSIS — G47.33 OSA (OBSTRUCTIVE SLEEP APNEA): ICD-10-CM

## 2018-08-23 PROCEDURE — 95811 POLYSOM 6/>YRS CPAP 4/> PARM: CPT

## 2018-08-24 VITALS — WEIGHT: 215 LBS | BODY MASS INDEX: 33.74 KG/M2 | RESPIRATION RATE: 18 BRPM | HEART RATE: 71 BPM | HEIGHT: 67 IN

## 2018-09-25 LAB — STATUS: NORMAL

## 2018-10-17 ENCOUNTER — OFFICE VISIT (OUTPATIENT)
Dept: PODIATRY | Age: 43
End: 2018-10-17
Payer: MEDICAID

## 2018-10-17 VITALS — WEIGHT: 222 LBS | RESPIRATION RATE: 16 BRPM | BODY MASS INDEX: 34.77 KG/M2

## 2018-10-17 DIAGNOSIS — B35.1 DERMATOPHYTOSIS OF NAIL: Primary | ICD-10-CM

## 2018-10-17 DIAGNOSIS — M79.605 PAIN IN BOTH LOWER EXTREMITIES: ICD-10-CM

## 2018-10-17 DIAGNOSIS — D23.72 BENIGN NEOPLASM OF SKIN OF LEFT FOOT: ICD-10-CM

## 2018-10-17 DIAGNOSIS — L60.0 INGROWN NAIL: ICD-10-CM

## 2018-10-17 DIAGNOSIS — R26.2 DIFFICULTY WALKING: ICD-10-CM

## 2018-10-17 DIAGNOSIS — M79.604 PAIN IN BOTH LOWER EXTREMITIES: ICD-10-CM

## 2018-10-17 PROCEDURE — G8427 DOCREV CUR MEDS BY ELIG CLIN: HCPCS | Performed by: PODIATRIST

## 2018-10-17 PROCEDURE — G8417 CALC BMI ABV UP PARAM F/U: HCPCS | Performed by: PODIATRIST

## 2018-10-17 PROCEDURE — G8484 FLU IMMUNIZE NO ADMIN: HCPCS | Performed by: PODIATRIST

## 2018-10-17 PROCEDURE — 4004F PT TOBACCO SCREEN RCVD TLK: CPT | Performed by: PODIATRIST

## 2018-10-17 PROCEDURE — 17110 DESTRUCTION B9 LES UP TO 14: CPT | Performed by: PODIATRIST

## 2018-10-17 PROCEDURE — 11721 DEBRIDE NAIL 6 OR MORE: CPT | Performed by: PODIATRIST

## 2018-10-17 NOTE — PROGRESS NOTES
[facility]  [facility] [address]  [address] [facility] [address]  Dept: [phone]  Dept Fax: [phone]    NAIL PAIN PROGRESS NOTE  Date of patient's visit: 10/17/2018  Patient's Name:  Adair Alvares YOB: 1975            Patient Care Team:  TRES Hall CNP as PCP - General (Certified Nurse Practitioner)  TRES Hall CNP as PCP - [facility] Attributed Provider  Vlad Ribera MD as Consulting Physician (Ophthalmology)  Karen Ruvalcaba DPM as Physician (Podiatry)      Chief Complaint   Patient presents with    Nail Problem       Subjective: This Adair Alvares comes to clinic for foot and nail care. Pt currently has complaint of thickened, painful, elongated nails that he/she cannot manage by themselves. He also relates to painful skin lesions to left foot. Pt. Relates pain to nails with shoe gear. Pt's primary care physician is TRES Solitario CNP last seen 8/9/18. Past Medical History:   Diagnosis Date    Dry skin     GERD (gastroesophageal reflux disease)     Hematuria     HLD (hyperlipidemia) 3/2/2015    Hypertension     AIDEN (obstructive sleep apnea)     Pre-diabetes     Sleep apnea     does not use a machine    Wears dentures     upper       No Known Allergies  Current Outpatient Prescriptions on File Prior to Visit   Medication Sig Dispense Refill    hydrochlorothiazide (MICROZIDE) 12.5 MG capsule Take by mouth      tamsulosin (FLOMAX) 0.4 MG capsule Take 1 capsule by mouth daily 90 capsule 3    ammonium lactate (LAC-HYDRIN) 12 % cream Apply topically as needed.  1 Bottle 4    potassium bicarbonate (EFFER-K) 25 MEQ disintegrating tablet Dissolve two tablets into 4 to 8 oz of water daily 60 tablet 5    omeprazole (PRILOSEC) 20 MG delayed release capsule Take one tablet daily for acid indigestion and heartburn 30 capsule 5    lisinopril-hydrochlorothiazide (PRINZIDE;ZESTORETIC) 10-12.5 MG per tablet Musculoskeletal:     1st MPJ ROM decreased, Bilateral.  Muscle strength 5/5, Bilateral.  Pain present upon palpation of toenails 1-5, Bilateral. decreased medial longitudinal arch, Bilateral.  Ankle ROM decreased,Bilateral.    Dorsally contracted digits present digits 2, Bilateral.     Vascular: DP and PT pulses palpable 2/4, Bilateral.  CFT <5 seconds, Bilateral.  Hair growth absent to the level of the digits, Bilateral.  Edema present, Bilateral.  Varicosities absent, Bilateral. Erythema absent, Bilateral    Neurological: Sensation intact to light touch to level of digits, Bilateral.  Protective sensation intact 10/10 sites via 5.07/10g [place]-Zoe Monofilament, Bilateral.  negative Tinel's, Bilateral.  negative Valleix sign, Bilateral.      Integument: Warm, dry, supple, Bilateral.  Open lesion absent, Bilateral.  Benign skin neoplasm left plantar foot x 2 number of total spots. Interdigital maceration absent to web spaces 4, Bilateral.  Nails 1-5 left and 1-5 right thickened > 3.0 mm, dystrophic and crumbly, discolored with subungual debris. Fissures absent, Bilateral.       Assessment:  39 y.o. male with:    Diagnosis Orders   1. Dermatophytosis of nail  94294 - [place] DEBRIDEMENT OF NAILS, 6 OR MORE   2. Benign neoplasm of skin of left foot  23227 - [place] DESTRUCTION BENIGN LESIONS UP TO 14   3. Pain in both lower extremities  82838 - [place] DEBRIDEMENT OF NAILS, 6 OR MORE    69723 - [place] DESTRUCTION BENIGN LESIONS UP TO 14   4. Difficulty walking  69248 - [place] DEBRIDEMENT OF NAILS, 6 OR MORE    68580 - [place] DESTRUCTION BENIGN LESIONS UP TO 14   5. Ingrown nail  70627 - [place] DEBRIDEMENT OF NAILS, 6 OR MORE         Plan:   Pt was evaluated and examined. Patient was given personalized discharge instructions. The lesion was partially excised and Pyrogallic acid was applied under occlusion. The patient will leave in place for 24-48 hours and than remove.  The patient tolerated the procedure well and without

## 2018-11-08 DIAGNOSIS — Z76.0 MEDICATION REFILL: ICD-10-CM

## 2018-11-08 RX ORDER — LISINOPRIL AND HYDROCHLOROTHIAZIDE 12.5; 1 MG/1; MG/1
TABLET ORAL
Qty: 30 TABLET | Refills: 5 | Status: SHIPPED | OUTPATIENT
Start: 2018-11-08 | End: 2019-02-22 | Stop reason: SDUPTHER

## 2019-01-09 ENCOUNTER — OFFICE VISIT (OUTPATIENT)
Dept: PODIATRY | Age: 44
End: 2019-01-09
Payer: MEDICAID

## 2019-01-09 VITALS — BODY MASS INDEX: 32.88 KG/M2 | RESPIRATION RATE: 16 BRPM | WEIGHT: 222 LBS | HEIGHT: 69 IN

## 2019-01-09 DIAGNOSIS — R26.2 DIFFICULTY WALKING: ICD-10-CM

## 2019-01-09 DIAGNOSIS — M79.605 PAIN OF LEFT LOWER EXTREMITY: ICD-10-CM

## 2019-01-09 DIAGNOSIS — D23.72 BENIGN NEOPLASM OF SKIN OF LEFT FOOT: Primary | ICD-10-CM

## 2019-01-09 PROCEDURE — G8417 CALC BMI ABV UP PARAM F/U: HCPCS | Performed by: PODIATRIST

## 2019-01-09 PROCEDURE — 99213 OFFICE O/P EST LOW 20 MIN: CPT | Performed by: PODIATRIST

## 2019-01-09 PROCEDURE — 4004F PT TOBACCO SCREEN RCVD TLK: CPT | Performed by: PODIATRIST

## 2019-01-09 PROCEDURE — G8427 DOCREV CUR MEDS BY ELIG CLIN: HCPCS | Performed by: PODIATRIST

## 2019-01-09 PROCEDURE — G8484 FLU IMMUNIZE NO ADMIN: HCPCS | Performed by: PODIATRIST

## 2019-01-09 PROCEDURE — 17110 DESTRUCTION B9 LES UP TO 14: CPT | Performed by: PODIATRIST

## 2019-02-22 ENCOUNTER — OFFICE VISIT (OUTPATIENT)
Dept: FAMILY MEDICINE CLINIC | Age: 44
End: 2019-02-22
Payer: MEDICAID

## 2019-02-22 VITALS
WEIGHT: 228.4 LBS | SYSTOLIC BLOOD PRESSURE: 110 MMHG | DIASTOLIC BLOOD PRESSURE: 68 MMHG | HEART RATE: 105 BPM | TEMPERATURE: 99.1 F | BODY MASS INDEX: 33.73 KG/M2 | OXYGEN SATURATION: 98 %

## 2019-02-22 DIAGNOSIS — E78.5 HYPERLIPIDEMIA, UNSPECIFIED HYPERLIPIDEMIA TYPE: ICD-10-CM

## 2019-02-22 DIAGNOSIS — I10 ESSENTIAL HYPERTENSION: ICD-10-CM

## 2019-02-22 DIAGNOSIS — Z76.0 MEDICATION REFILL: ICD-10-CM

## 2019-02-22 DIAGNOSIS — R73.03 PRE-DIABETES: Primary | ICD-10-CM

## 2019-02-22 LAB — HBA1C MFR BLD: 5.9 %

## 2019-02-22 PROCEDURE — 99214 OFFICE O/P EST MOD 30 MIN: CPT | Performed by: NURSE PRACTITIONER

## 2019-02-22 PROCEDURE — G8417 CALC BMI ABV UP PARAM F/U: HCPCS | Performed by: NURSE PRACTITIONER

## 2019-02-22 PROCEDURE — 4004F PT TOBACCO SCREEN RCVD TLK: CPT | Performed by: NURSE PRACTITIONER

## 2019-02-22 PROCEDURE — 83036 HEMOGLOBIN GLYCOSYLATED A1C: CPT | Performed by: NURSE PRACTITIONER

## 2019-02-22 PROCEDURE — G8484 FLU IMMUNIZE NO ADMIN: HCPCS | Performed by: NURSE PRACTITIONER

## 2019-02-22 PROCEDURE — G8427 DOCREV CUR MEDS BY ELIG CLIN: HCPCS | Performed by: NURSE PRACTITIONER

## 2019-02-22 RX ORDER — LISINOPRIL AND HYDROCHLOROTHIAZIDE 12.5; 1 MG/1; MG/1
TABLET ORAL
Qty: 30 TABLET | Refills: 5 | Status: SHIPPED | OUTPATIENT
Start: 2019-02-22 | End: 2019-08-22 | Stop reason: SDUPTHER

## 2019-02-22 RX ORDER — BETAMETHASONE DIPROPIONATE 0.05 %
OINTMENT (GRAM) TOPICAL
Qty: 1 TUBE | Refills: 0 | Status: SHIPPED | OUTPATIENT
Start: 2019-02-22 | End: 2019-08-22 | Stop reason: ALTCHOICE

## 2019-02-22 ASSESSMENT — PATIENT HEALTH QUESTIONNAIRE - PHQ9
SUM OF ALL RESPONSES TO PHQ QUESTIONS 1-9: 0
2. FEELING DOWN, DEPRESSED OR HOPELESS: 0
SUM OF ALL RESPONSES TO PHQ QUESTIONS 1-9: 0
1. LITTLE INTEREST OR PLEASURE IN DOING THINGS: 0
SUM OF ALL RESPONSES TO PHQ9 QUESTIONS 1 & 2: 0

## 2019-02-22 ASSESSMENT — ENCOUNTER SYMPTOMS
COUGH: 0
SHORTNESS OF BREATH: 0

## 2019-02-26 ENCOUNTER — HOSPITAL ENCOUNTER (OUTPATIENT)
Age: 44
Setting detail: SPECIMEN
Discharge: HOME | End: 2019-02-26
Payer: MEDICAID

## 2019-02-26 DIAGNOSIS — E78.5 HYPERLIPIDEMIA, UNSPECIFIED HYPERLIPIDEMIA TYPE: ICD-10-CM

## 2019-02-26 DIAGNOSIS — I10 ESSENTIAL HYPERTENSION: ICD-10-CM

## 2019-02-26 LAB
HCT VFR BLD CALC: 41.7 % (ref 40.7–50.3)
HEMOGLOBIN: 13.4 G/DL (ref 13–17)
MCH RBC QN AUTO: 27.5 PG (ref 25.2–33.5)
MCHC RBC AUTO-ENTMCNC: 32.1 G/DL (ref 28.4–34.8)
MCV RBC AUTO: 85.6 FL (ref 82.6–102.9)
NRBC AUTOMATED: 0 PER 100 WBC
PDW BLD-RTO: 15.3 % (ref 11.8–14.4)
PLATELET # BLD: 260 K/UL (ref 138–453)
PMV BLD AUTO: 9.2 FL (ref 8.1–13.5)
RBC # BLD: 4.87 M/UL (ref 4.21–5.77)
WBC # BLD: 5.8 K/UL (ref 3.5–11.3)

## 2019-02-27 LAB
ALBUMIN SERPL-MCNC: 4.2 G/DL (ref 3.5–5.2)
ALBUMIN/GLOBULIN RATIO: 1.4 (ref 1–2.5)
ALP BLD-CCNC: 57 U/L (ref 40–129)
ALT SERPL-CCNC: 25 U/L (ref 5–41)
ANION GAP SERPL CALCULATED.3IONS-SCNC: 15 MMOL/L (ref 9–17)
AST SERPL-CCNC: 19 U/L
BILIRUB SERPL-MCNC: 0.34 MG/DL (ref 0.3–1.2)
BUN BLDV-MCNC: 12 MG/DL (ref 6–20)
BUN/CREAT BLD: NORMAL (ref 9–20)
CALCIUM SERPL-MCNC: 9.4 MG/DL (ref 8.6–10.4)
CHLORIDE BLD-SCNC: 106 MMOL/L (ref 98–107)
CHOLESTEROL/HDL RATIO: 4.6
CHOLESTEROL: 173 MG/DL
CO2: 22 MMOL/L (ref 20–31)
CREAT SERPL-MCNC: 0.81 MG/DL (ref 0.7–1.2)
GFR AFRICAN AMERICAN: >60 ML/MIN
GFR NON-AFRICAN AMERICAN: >60 ML/MIN
GFR SERPL CREATININE-BSD FRML MDRD: NORMAL ML/MIN/{1.73_M2}
GFR SERPL CREATININE-BSD FRML MDRD: NORMAL ML/MIN/{1.73_M2}
GLUCOSE BLD-MCNC: 98 MG/DL (ref 70–99)
HDLC SERPL-MCNC: 38 MG/DL
LDL CHOLESTEROL: 104 MG/DL (ref 0–130)
POTASSIUM SERPL-SCNC: 4.7 MMOL/L (ref 3.7–5.3)
SODIUM BLD-SCNC: 143 MMOL/L (ref 135–144)
TOTAL PROTEIN: 7.2 G/DL (ref 6.4–8.3)
TRIGL SERPL-MCNC: 156 MG/DL
VLDLC SERPL CALC-MCNC: ABNORMAL MG/DL (ref 1–30)

## 2019-03-07 ENCOUNTER — OFFICE VISIT (OUTPATIENT)
Dept: UROLOGY | Age: 44
End: 2019-03-07
Payer: MEDICAID

## 2019-03-07 VITALS
WEIGHT: 228 LBS | OXYGEN SATURATION: 98 % | DIASTOLIC BLOOD PRESSURE: 84 MMHG | HEART RATE: 88 BPM | BODY MASS INDEX: 35.79 KG/M2 | HEIGHT: 67 IN | SYSTOLIC BLOOD PRESSURE: 134 MMHG

## 2019-03-07 DIAGNOSIS — R35.1 NOCTURIA: ICD-10-CM

## 2019-03-07 DIAGNOSIS — N40.1 HYPERTROPHY OF PROSTATE WITH URINARY OBSTRUCTION: Primary | ICD-10-CM

## 2019-03-07 DIAGNOSIS — N13.8 HYPERTROPHY OF PROSTATE WITH URINARY OBSTRUCTION: Primary | ICD-10-CM

## 2019-03-07 PROCEDURE — 99213 OFFICE O/P EST LOW 20 MIN: CPT | Performed by: UROLOGY

## 2019-03-07 PROCEDURE — G8427 DOCREV CUR MEDS BY ELIG CLIN: HCPCS | Performed by: UROLOGY

## 2019-03-07 PROCEDURE — G8417 CALC BMI ABV UP PARAM F/U: HCPCS | Performed by: UROLOGY

## 2019-03-07 PROCEDURE — 4004F PT TOBACCO SCREEN RCVD TLK: CPT | Performed by: UROLOGY

## 2019-03-07 PROCEDURE — G8484 FLU IMMUNIZE NO ADMIN: HCPCS | Performed by: UROLOGY

## 2019-03-07 ASSESSMENT — ENCOUNTER SYMPTOMS
WHEEZING: 0
DIARRHEA: 0
COUGH: 0
ABDOMINAL PAIN: 0
RESPIRATORY NEGATIVE: 1
CONSTIPATION: 0
SHORTNESS OF BREATH: 0
EYE REDNESS: 0
VOMITING: 0
EYE PAIN: 0
GASTROINTESTINAL NEGATIVE: 1
EYES NEGATIVE: 1
BACK PAIN: 0
NAUSEA: 0

## 2019-03-13 ENCOUNTER — OFFICE VISIT (OUTPATIENT)
Dept: PODIATRY | Age: 44
End: 2019-03-13
Payer: MEDICAID

## 2019-03-13 VITALS — HEIGHT: 69 IN | BODY MASS INDEX: 32.88 KG/M2 | WEIGHT: 222 LBS | RESPIRATION RATE: 16 BRPM

## 2019-03-13 DIAGNOSIS — R26.2 DIFFICULTY WALKING: ICD-10-CM

## 2019-03-13 DIAGNOSIS — M79.604 PAIN IN BOTH LOWER EXTREMITIES: ICD-10-CM

## 2019-03-13 DIAGNOSIS — B35.1 DERMATOPHYTOSIS OF NAIL: ICD-10-CM

## 2019-03-13 DIAGNOSIS — M79.605 PAIN IN BOTH LOWER EXTREMITIES: ICD-10-CM

## 2019-03-13 DIAGNOSIS — D23.72 BENIGN NEOPLASM OF SKIN OF LEFT FOOT: Primary | ICD-10-CM

## 2019-03-13 DIAGNOSIS — L60.0 INGROWN NAIL: ICD-10-CM

## 2019-03-13 PROCEDURE — G8484 FLU IMMUNIZE NO ADMIN: HCPCS | Performed by: PODIATRIST

## 2019-03-13 PROCEDURE — 17110 DESTRUCTION B9 LES UP TO 14: CPT | Performed by: PODIATRIST

## 2019-03-13 PROCEDURE — 4004F PT TOBACCO SCREEN RCVD TLK: CPT | Performed by: PODIATRIST

## 2019-03-13 PROCEDURE — 99213 OFFICE O/P EST LOW 20 MIN: CPT | Performed by: PODIATRIST

## 2019-03-13 PROCEDURE — 11721 DEBRIDE NAIL 6 OR MORE: CPT | Performed by: PODIATRIST

## 2019-03-13 PROCEDURE — G8417 CALC BMI ABV UP PARAM F/U: HCPCS | Performed by: PODIATRIST

## 2019-03-13 PROCEDURE — G8428 CUR MEDS NOT DOCUMENT: HCPCS | Performed by: PODIATRIST

## 2019-04-15 DIAGNOSIS — Z76.0 MEDICATION REFILL: ICD-10-CM

## 2019-04-16 RX ORDER — POTASSIUM BICARBONATE 25 MEQ/1
TABLET, EFFERVESCENT ORAL
Qty: 60 TABLET | Refills: 5 | Status: SHIPPED | OUTPATIENT
Start: 2019-04-16 | End: 2019-09-27 | Stop reason: SDUPTHER

## 2019-05-15 ENCOUNTER — OFFICE VISIT (OUTPATIENT)
Dept: FAMILY MEDICINE CLINIC | Age: 44
End: 2019-05-15
Payer: MEDICAID

## 2019-05-15 ENCOUNTER — OFFICE VISIT (OUTPATIENT)
Dept: PODIATRY | Age: 44
End: 2019-05-15
Payer: MEDICAID

## 2019-05-15 VITALS
OXYGEN SATURATION: 98 % | TEMPERATURE: 98.8 F | SYSTOLIC BLOOD PRESSURE: 124 MMHG | HEART RATE: 92 BPM | BODY MASS INDEX: 33.05 KG/M2 | DIASTOLIC BLOOD PRESSURE: 78 MMHG | WEIGHT: 223.8 LBS

## 2019-05-15 VITALS — BODY MASS INDEX: 35 KG/M2 | HEIGHT: 67 IN | RESPIRATION RATE: 16 BRPM | WEIGHT: 223 LBS

## 2019-05-15 DIAGNOSIS — M25.512 ACUTE PAIN OF LEFT SHOULDER: Primary | ICD-10-CM

## 2019-05-15 DIAGNOSIS — M79.605 PAIN IN BOTH LOWER EXTREMITIES: ICD-10-CM

## 2019-05-15 DIAGNOSIS — M79.604 PAIN IN BOTH LOWER EXTREMITIES: ICD-10-CM

## 2019-05-15 DIAGNOSIS — L60.0 INGROWN NAIL: ICD-10-CM

## 2019-05-15 DIAGNOSIS — B35.1 DERMATOPHYTOSIS OF NAIL: ICD-10-CM

## 2019-05-15 DIAGNOSIS — D23.72 BENIGN NEOPLASM OF SKIN OF LEFT FOOT: Primary | ICD-10-CM

## 2019-05-15 PROCEDURE — G8417 CALC BMI ABV UP PARAM F/U: HCPCS | Performed by: NURSE PRACTITIONER

## 2019-05-15 PROCEDURE — G8417 CALC BMI ABV UP PARAM F/U: HCPCS | Performed by: PODIATRIST

## 2019-05-15 PROCEDURE — 4004F PT TOBACCO SCREEN RCVD TLK: CPT | Performed by: PODIATRIST

## 2019-05-15 PROCEDURE — 4004F PT TOBACCO SCREEN RCVD TLK: CPT | Performed by: NURSE PRACTITIONER

## 2019-05-15 PROCEDURE — G8427 DOCREV CUR MEDS BY ELIG CLIN: HCPCS | Performed by: PODIATRIST

## 2019-05-15 PROCEDURE — 99213 OFFICE O/P EST LOW 20 MIN: CPT | Performed by: NURSE PRACTITIONER

## 2019-05-15 PROCEDURE — 17110 DESTRUCTION B9 LES UP TO 14: CPT | Performed by: PODIATRIST

## 2019-05-15 PROCEDURE — 11721 DEBRIDE NAIL 6 OR MORE: CPT | Performed by: PODIATRIST

## 2019-05-15 PROCEDURE — G8427 DOCREV CUR MEDS BY ELIG CLIN: HCPCS | Performed by: NURSE PRACTITIONER

## 2019-05-15 ASSESSMENT — ENCOUNTER SYMPTOMS
SHORTNESS OF BREATH: 0
COUGH: 0

## 2019-05-15 NOTE — PROGRESS NOTES
Patient is present complaining of left arm pain x1 month  Patient state he was fell about a month ago and landed on his arm  Patient states it is a sharp pain  Patient states he has trouble lifting his arm up  Patient states he has been using a cream and taking ibuprofen    Pain level 6/10    Pharmacy and medication reviewed with patient    Visit Information    Have you changed or started any medications since your last visit including any over-the-counter medicines, vitamins, or herbal medicines? no   Have you stopped taking any of your medications? Is so, why? -  no  Are you having any side effects from any of your medications? - no    Have you seen any other physician or provider since your last visit?  no   Have you had any other diagnostic tests since your last visit?  no   Have you been seen in the emergency room and/or had an admission in a hospital since we last saw you?  no   Have you had your routine dental cleaning in the past 6 months?  yes -      Do you have an active MyChart account? If no, what is the barrier?   Yes    Patient Care Team:  TRES Olson CNP as PCP - General (Certified Nurse Practitioner)  TRES Olson CNP as PCP - S Attributed Provider  Tali Neri MD as Consulting Physician (Ophthalmology)  Neeraj Ritchie DPM as Physician (Podiatry)    Medical History Review  Past Medical, Family, and Social History reviewed and does contribute to the patient presenting condition    Health Maintenance   Topic Date Due    Pneumococcal 0-64 years Vaccine (1 of 1 - PPSV23) 09/11/1980    Flu vaccine (Season Ended) 02/22/2020 (Originally 9/1/2019)    A1C test (Diabetic or Prediabetic)  02/22/2020    Potassium monitoring  02/26/2020    Creatinine monitoring  02/26/2020    Lipid screen  02/26/2024    DTaP/Tdap/Td vaccine (2 - Td) 12/16/2027    HIV screen  Completed

## 2019-05-15 NOTE — PROGRESS NOTES
 ammonium lactate (LAC-HYDRIN) 12 % cream Apply topically as needed. 1 Bottle 4    omeprazole (PRILOSEC) 20 MG delayed release capsule Take one tablet daily for acid indigestion and heartburn 30 capsule 5     No current facility-administered medications on file prior to visit. Review of Systems    Review of Systems:   History obtained from chart review and the patient  General ROS: negative for - chills, fatigue, fever, night sweats or weight gain  Constitutional: Negative for chills, diaphoresis, fatigue, fever and unexpected weight change. Musculoskeletal: Positive for arthralgias, gait problem and joint swelling. Neurological ROS: negative for - behavioral changes, confusion, headaches or seizures. Negative for weakness and numbness. Dermatological ROS: negative for - mole changes, rash  Cardiovascular: Negative for leg swelling. Gastrointestinal: Negative for constipation, diarrhea, nausea and vomiting. Objective:  General: AAO x 3 in NAD.     Derm  Toenail Description  Sites of Onychomycosis Involvement (Check affected area)  [x] [x] [x] [x] [x] [x] [x] [x] [x] [x]  5 4 3 2 1 1 2 3 4 5                          Right                                        Left    Thickness  [x] [x] [x] [x] [x] [x] [x] [x] [x] [x]  5 4 3 2 1 1 2 3 4 5                         Right                                        Left    Dystrophic Changes   [x] [x] [x] [x] [x] [x] [x] [x] [x] [x]  5 4 3 2 1 1 2 3 4 5                         Right                                        Left    Color  [x] [x] [x] [x] [x] [x] [x] [x] [x] [x]  5 4 3 2 1 1 2 3 4 5                          Right                                        Left    Incurvation/Ingrowin   [] [] [] [] [] [] [] [] [] []  5 4 3 2 1 1 2 3 4 5                         Right                                        Left    Inflammation/Pain   [x] [x] [x] [x] [x] [x] [x] [x] [x] [x]  5 4 3  2 1 1 2 3 4 5                         Right Left       Nails are painful 1-10 with direct palpation. Dermatologic Exam:  Skin lesion/ulceration Absent . Skin No rashes or nodules noted. .       Musculoskeletal:     1st MPJ ROM decreased, Bilateral.  Muscle strength 5/5, Bilateral.  Pain present upon palpation of toenails 1-5, Bilateral. decreased medial longitudinal arch, Bilateral.  Ankle ROM decreased,Bilateral.    Dorsally contracted digits present digits 2, Bilateral.     Vascular: DP and PT pulses palpable 2/4, Bilateral.  CFT <5 seconds, Bilateral.  Hair growth absent to the level of the digits, Bilateral.  Edema present, Bilateral.  Varicosities absent, Bilateral. Erythema absent, Bilateral    Neurological: Sensation intact to light touch to level of digits, Bilateral.  Protective sensation intact 10/10 sites via 5.07/10g [place]-Zoe Monofilament, Bilateral.  negative Tinel's, Bilateral.  negative Valleix sign, Bilateral.      Integument: Warm, dry, supple, Bilateral.  Open lesion absent, Bilateral.  .benign skin neoplasm left foot x 3 with petechiae. Interdigital maceration absent to web spaces 4, Bilateral.  Nails 1-5 left and 1-5 right thickened > 3.0 mm, dystrophic and crumbly, discolored with subungual debris. Fissures absent, Bilateral.     Assessment:  39 y.o. male with:    Diagnosis Orders   1. Benign neoplasm of skin of left foot  87184 - [place] DESTRUCTION BENIGN LESIONS UP TO 14   2. Dermatophytosis of nail  03945 - [place] DEBRIDEMENT OF NAILS, 6 OR MORE    54092 - [place] DESTRUCTION BENIGN LESIONS UP TO 14   3. Pain in both lower extremities  71471 - [place] DEBRIDEMENT OF NAILS, 6 OR MORE    54537 - [place] DESTRUCTION BENIGN LESIONS UP TO 14   4. Ingrown nail  28214 - [place] DEBRIDEMENT OF NAILS, 6 OR MORE       Plan:   Pt was evaluated and examined. Patient was given personalized discharge instructions. Nails 1-10 were debrided in length and thickness sharply with a nail nipper and  without incident.  Pt will follow up in 9 weeks or sooner if any problems arise. Diagnosis was discussed with the pt and all of their questions were answered in detail. Proper foot hygiene and care was discussed with the pt. Patient to check feet daily and contact the office with any questions/problems/concerns. Other comorbidity noted and will be managed by PCP. Pain waiver discussed with patient and confirmed. 5/15/2019    The lesion was partially debrided and silver nitrate was applied with an apperature pad under occlusion. The patient will leave in place for 24-48 hours and than remove. The patient tolerated the procedure well and without complication.    Pt will follow up in 9 weeks         Electronically signed by Mali Ramirez DPM on 5/15/2019 at 2:12 PM  5/15/2019
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
## 2019-05-23 ENCOUNTER — HOSPITAL ENCOUNTER (OUTPATIENT)
Age: 44
Discharge: HOME | End: 2019-05-25
Payer: MEDICAID

## 2019-05-23 ENCOUNTER — HOSPITAL ENCOUNTER (OUTPATIENT)
Dept: GENERAL RADIOLOGY | Age: 44
Discharge: HOME | End: 2019-05-25
Payer: MEDICAID

## 2019-05-23 DIAGNOSIS — M25.512 ACUTE PAIN OF LEFT SHOULDER: ICD-10-CM

## 2019-05-23 PROCEDURE — 73030 X-RAY EXAM OF SHOULDER: CPT

## 2019-05-31 DIAGNOSIS — M25.512 ACUTE PAIN OF LEFT SHOULDER: Primary | ICD-10-CM

## 2019-06-12 ENCOUNTER — HOSPITAL ENCOUNTER (OUTPATIENT)
Dept: PHYSICAL THERAPY | Facility: CLINIC | Age: 44
Setting detail: THERAPIES SERIES
Discharge: HOME | End: 2019-06-12
Payer: MEDICAID

## 2019-06-12 PROCEDURE — 97161 PT EVAL LOW COMPLEX 20 MIN: CPT

## 2019-06-12 PROCEDURE — 97110 THERAPEUTIC EXERCISES: CPT

## 2019-06-12 NOTE — CONSULTS
Employed               Pain:  [x] Yes   Location: left shoulder  Pain Rating: (0-10 scale) 8/10  Pain altered Tx:  [x] Yes  [] No  Action: increased with AROM  Symptoms:   [x] Same  Better:  Decreased overhead movement  Worse:   [x] Other: overhead reach, shirt tuck  Sleep:  [x] Disturbed - increased left shoulder pain even with sleeping on back    Objective:     ROM  °A/P END FEEL STRENGTH TESTS (+/-) Left Right Not Tested    Left Right  Left Right Drop Arm   []   Sit Shld Flex 80 wfl  4+ 5 Sulcus Sign   []   Sit Shld Abd 96 wfl  4 5 Apprehension   []   Sit Shld IR      Yergasons   []   Shoulder Flex      Speeds +  []   Ext 55 wfl    Neer   []   ABD      Zeng  +  []   ER @ 45 40 wfl  4 5 Painful Arc +  []   IR    4+ 5 Tinel   []   Elbow Flex. 5- 5       Elbow Ext. 5- 5       Pronation            Supination            Wrist Flex. 5 5       Wrist Ext.    5 5       Rad. Dev. Ulnar Dev.                             OBSERVATION No Deficit Deficit Not Tested Comments   Forward Head [] [x] []    Rounded Shoulders [] [x] []    Kyphosis [] [x] []    Scap Height/Position [x] [] []    Winging [] [x] []    SH Rhythm [] [x] []    INSPECTION/PALPATION       SC/AC Joint [x] [] []    Supraspinatus [] [x] [] Tender to palpation   Biceps tendon/groove [] [x] [] Tender to palpation   Posterior shld [] [x] [] Tender to palpation along superior scapula   Subscapularis [x] [] []    NEUROLOGICAL       Cervical ROM/Quadrant [x] [] [] WFL cervical AROM, patient with some right sided symptoms at end range cervical holds   Reflexes [] [] [x]    Compression/Distraction [x] [] []    Sensation [x] [] []        FUNCTION Normal Difficult Unable   Overhead reach [] [x] []   Underarm reach  [x] [] []   Groom/Dress [] [x] []   Bra/Shirt tuck [] [x] []   Lift/Carry [] [x] []    [] [] []     Comments: Upper Extremity Functional Index: 58/80, 27.5% impairment    Assessment: Pt presents with increased left shoulder pain following hitting his left shoulder after a fall last month. Pt with increased tissue tightness and clinical signs consistent with possible left shoulder impingement. Pt has tendency to sit with rounded shoulders and needs cuing to achieve upright posture. Pt with increased pain and difficulty with overhead reach and shirt tuck motions. Able to achieve increased motion with PROM testing with empty end feel in all planes and muscle guarding present. Pt educated on importance of maintaining range of motion so that he does not get increased capsular tightness. Pt would benefit from skilled physical therapy intervention to increase ROM, strength, functional activity tolerance, and to decrease pain. Problems:    [x] ? Pain: left shoulder pain, 8/10  [x] ? ROM: decreased left shoulder AROM  [x] ? Strength: global left upper extremity weakness  [x] ? Function: UEFI = 27.5% impairment  [] Other:    STG: (to be met in 6 treatments)  1. ? Pain: Pt will report decreased left shoulder pain to 5/10 following reaching overhead and reaching behind his back. 2. ? ROM: Pt will increase left shoulder AROM by 20 degrees in flexion and abduction in order to reach overhead. 3. ? Strength: Pt will increase left shoulder strength to 5/5 globally in order to lift and carry objects. 4. ? Function:   5. Independent with Home Exercise Programs  LTG: (to be met in 12 treatments)  6. ? Pain: Pt will report decreased left shoulder pain to 2/10 following reaching overhead and reaching behind his back. 7. ? ROM: Pt will increase left shoulder AROM by 30 degrees in flexion and abduction in order to reach overhead. 8. ? Function: Pt will demonstrate improved functional activity tolerance as evident by an improved score on the UEFI to less than 15% impairment. 9. Pt will no longer report instances of his left upper extremity going numb.                    Patient goals:    Rehab Potential:  [x] Good  [] Fair  [] Poor   Suggested Professional Referral:  [x] No  [] Yes:  Barriers to Goal Achievement:  [x] No  [] Yes:  Domestic Concerns:  [x] No  [] Yes:    Pt. Education:  [x] Plans/Goals, Risks/Benefits discussed  [x] Home exercise program  Method of Education: [x] Verbal  [x] Demo  [x] Written  Comprehension of Education:  [x] Verbalizes understanding. [x] Demonstrates understanding. [x] Needs Review. [] Demonstrates/verbalizes understanding of HEP/Ed previously given. Treatment Plan:  [x] Therapeutic Exercise  [x] Modalities: Prn pain [] Dry Needling  [] Therapeutic Activity  [] Ultrasound  [] Electrical Stimulation  [] Gait Training   [] Massage       [] Lumbar/Cervical Traction  [] Neuromuscular Re-education [] Cold/hotpack [] Iontophoresis: 4 mg/mL  [x] Instruction in HEP             Dexamethasone Sodium  [x] Manual Therapy             Phosphate  mAmin  [] Aquatic Therapy                   [] Vasocompression/    [] Other:            Game Ready   []  Medication allergies reviewed for use of    Dexamethasone Sodium Phosphate 4mg/ml     with iontophoresis treatments. Pt is not allergic.        Frequency: 2 x/week for 12 visits    Todays Treatment:  Modalities:   Precautions:  Exercises:  Exercise Reps/ Time Weight/ Level Comments   Cane shoulder AAROM (flex, abduction, ER) 10x5\" ea  Given as part of HEP   Scapular retraction 10x5\"  Given as part of HEP         Postural education   Pt educated on decreasing rounded shoulder posture to decrease further impingement in front of left shoulder         Other:    Specific Instructions for next treatment: postural awareness, progress shoulder strengthening and ROM exercises, scapular stabilization      Evaluation Complexity:  History (Personal factors, comorbidities) [] 0 [x] 1-2 [] 3+   Exam (limitations, restrictions) [] 1-2 [x] 3 [] 4+   Clinical presentation (progression) [x] Stable [] Evolving  [] Unstable   Decision Making [x] Low [] Moderate [] High    [x] Low Complexity [] Moderate Complexity [] High Complexity       Treatment Charges: Mins Units   [x] Evaluation       [x]  Low       []  Moderate       []  High 30 1   []  Modalities     [x]  Ther Exercise 15 1   []  Manual Therapy     []  Ther Activities     []  Aquatics     []  Vasocompression     []  Other       TOTAL TREATMENT TIME: 45    Time in: 1750    Time Out: [address]    Electronically signed by: Gerardo Redd PT        Physician Signature:________________________________Date:__________________  By signing above or cosigning this note, I have reviewed this plan of care and certify a need for medically necessary rehabilitation services.      *PLEASE SIGN ABOVE AND FAX BACK ALL PAGES*

## 2019-06-13 NOTE — FLOWSHEET NOTE
[facility] Fall Risk Assessment    Patient Name:  Jadon Donis  : 1975        Risk Factor Scale  Score   History of Falls [] Yes  [x] No 25  0 0   Secondary Diagnosis [] Yes  [x] No 15  0 0   Ambulatory Aid [] Furniture  [] Crutches/cane/walker  [x] None/bedrest/wheelchair/nurse 30  15  0 0   IV/Heparin Lock [] Yes  [x] No 20  0 0   Gait/Transferring [] Impaired  [] Weak  [x] Normal/bedrest/immobile 20  10  0 0   Mental Status [] Forgets limitations  [x] Oriented to own ability 15  0 0      Total: 0     Based on the Assessment score: check the appropriate box.     [x]  No intervention needed   Low =   Score of 0-24    []  Use standard prevention interventions Moderate =  Score of 24-44   [] Give patient handout and discuss fall prevention strategies   [] Establish goal of education for patient/family RE: fall prevention strategies    []  Use high risk prevention interventions High = Score of 45 and higher   [] Give patient handout and discuss fall prevention strategies   [] Establish goal of education for patient/family Re: fall prevention strategies   [] Discuss lifeline / other resources    Electronically signed by:   Anabelle Devi PT  Date: 2019

## 2019-06-14 ENCOUNTER — HOSPITAL ENCOUNTER (OUTPATIENT)
Dept: PHYSICAL THERAPY | Facility: CLINIC | Age: 44
Setting detail: THERAPIES SERIES
Discharge: HOME | End: 2019-06-14
Payer: MEDICAID

## 2019-06-14 NOTE — FLOWSHEET NOTE
[] [facility]  [address]    [phone]  F: [phone]   [] [address]  [facility] 36   [address]  P: [phone]  F: [phone]  [] [place]  [address] [facility]  P: [phone]  F: [phone]   [] [address]  [facility] [address]   P: [phone]  F: [phone]  [] [place] [id number] [facility] [address]  [place]: [phone]   F: [phone]     Physical Therapy Cancel/No Show note    Date: 2019  Patient: Gloria Ruffin  : 1975  MRN: [id number]    Cancels/No Shows to date:     For today's appointment patient:    [x]  Cancelled    [] Rescheduled appointment    [] No-show     Reason given by patient:    []  Patient ill    []  Conflicting appointment    [] No transportation      [] Conflict with work    [] No reason given    [] Weather related    [x] Other:      Comments:  Family emergency.        [x] Next appointment was confirmed    Electronically signed by: Elidia Sanchez PTA

## 2019-06-18 ENCOUNTER — OFFICE VISIT (OUTPATIENT)
Dept: UROLOGY | Age: 44
End: 2019-06-18
Payer: MEDICAID

## 2019-06-18 VITALS
TEMPERATURE: 98.1 F | SYSTOLIC BLOOD PRESSURE: 123 MMHG | DIASTOLIC BLOOD PRESSURE: 86 MMHG | BODY MASS INDEX: 34.69 KG/M2 | HEART RATE: 83 BPM | HEIGHT: 67 IN | WEIGHT: 221 LBS

## 2019-06-18 DIAGNOSIS — R35.1 NOCTURIA: ICD-10-CM

## 2019-06-18 DIAGNOSIS — N40.1 HYPERTROPHY OF PROSTATE WITH URINARY OBSTRUCTION: Primary | ICD-10-CM

## 2019-06-18 DIAGNOSIS — R39.13 SPLIT OF URINARY STREAM: ICD-10-CM

## 2019-06-18 DIAGNOSIS — N13.8 HYPERTROPHY OF PROSTATE WITH URINARY OBSTRUCTION: Primary | ICD-10-CM

## 2019-06-18 PROCEDURE — G8417 CALC BMI ABV UP PARAM F/U: HCPCS | Performed by: UROLOGY

## 2019-06-18 PROCEDURE — G8427 DOCREV CUR MEDS BY ELIG CLIN: HCPCS | Performed by: UROLOGY

## 2019-06-18 PROCEDURE — 99214 OFFICE O/P EST MOD 30 MIN: CPT | Performed by: UROLOGY

## 2019-06-18 PROCEDURE — 4004F PT TOBACCO SCREEN RCVD TLK: CPT | Performed by: UROLOGY

## 2019-06-18 ASSESSMENT — ENCOUNTER SYMPTOMS
COUGH: 0
SHORTNESS OF BREATH: 0
VOMITING: 0
ABDOMINAL PAIN: 0
NAUSEA: 0
EYE REDNESS: 0
CONSTIPATION: 0
EYE PAIN: 0
DIARRHEA: 0
WHEEZING: 0
BACK PAIN: 0

## 2019-06-19 ENCOUNTER — HOSPITAL ENCOUNTER (OUTPATIENT)
Dept: PHYSICAL THERAPY | Facility: CLINIC | Age: 44
Setting detail: THERAPIES SERIES
Discharge: HOME | End: 2019-06-19
Payer: MEDICAID

## 2019-06-19 PROCEDURE — 97110 THERAPEUTIC EXERCISES: CPT

## 2019-06-19 NOTE — FLOWSHEET NOTE
further impingement in front of left shoulder             Other:     Specific Instructions for next treatment: postural awareness, progress shoulder strengthening and ROM exercises, scapular stabilization             Treatment Charges: Mins Units   [x]  Modalities CP 10  -   [x]  Ther Exercise 37 2   []  Manual Therapy     []  Ther Activities     []  Aquatics     []  Vasocompression     []  Other     Total Treatment time 37 2       Assessment: [] Progressing toward goals. Able to complete charted exercises with moderate tolerance. Pt with most difficulty completing prone Y's and I's. Tactile cuing to ensure scapular retraction with prone exercises. [] No change. [] Other:    STG: (to be met in 6 treatments)  1. ? Pain: Pt will report decreased left shoulder pain to 5/10 following reaching overhead and reaching behind his back. 2. ? ROM: Pt will increase left shoulder AROM by 20 degrees in flexion and abduction in order to reach overhead. 3. ? Strength: Pt will increase left shoulder strength to 5/5 globally in order to lift and carry objects. 4. ? Function:   5. Independent with Home Exercise Programs  LTG: (to be met in 12 treatments)  6. ? Pain: Pt will report decreased left shoulder pain to 2/10 following reaching overhead and reaching behind his back. 7. ? ROM: Pt will increase left shoulder AROM by 30 degrees in flexion and abduction in order to reach overhead. 8. ? Function: Pt will demonstrate improved functional activity tolerance as evident by an improved score on the UEFI to less than 15% impairment. 9. Pt will no longer report instances of his left upper extremity going numb.                    Patient goals:        Pt. Education:  [x] Yes  [] No  [x] Reviewed Prior HEP/Ed  Method of Education: [x] Verbal  [] Demo  [x] Written  6/19 - Sleeper stretch  Comprehension of Education:  [x] Verbalizes understanding. [x] Demonstrates understanding. [x] Needs review.   [] Demonstrates/verbalizes

## 2019-06-25 ENCOUNTER — HOSPITAL ENCOUNTER (OUTPATIENT)
Dept: PHYSICAL THERAPY | Facility: CLINIC | Age: 44
Setting detail: THERAPIES SERIES
Discharge: HOME | End: 2019-06-25
Payer: MEDICAID

## 2019-06-25 NOTE — FLOWSHEET NOTE
[] [address]  [address]    [phone]  F: [phone]   [x] [address] [facility] [address]   [address]  P: [phone]  F: [phone]  [] [address]  [address] [facility]  P: [phone]  F: [phone]   [] [address]  [facility] [address]   [place]: [phone]  F: [phone]  [] [address]  [place]: [phone]   F: [phone]     Physical Therapy Cancel/No Show note    Date: 2019  Patient: Mario Gonzalez  : 1975  MRN: [id number]    Cancels/No Shows to date:     For today's appointment patient:    [x]  Cancelled    [] Rescheduled appointment    [] No-show     Reason given by patient:    []  Patient ill    []  Conflicting appointment    [] No transportation      [] Conflict with work    [] No reason given    [] Weather related    [x] Other:  OOT    Comments:        [x] Next appointment was confirmed    Electronically signed by: Joi Campa PTA

## 2019-06-27 ENCOUNTER — HOSPITAL ENCOUNTER (OUTPATIENT)
Dept: PHYSICAL THERAPY | Facility: CLINIC | Age: 44
Setting detail: THERAPIES SERIES
Discharge: HOME | End: 2019-06-27
Payer: MEDICAID

## 2019-06-27 PROCEDURE — 97110 THERAPEUTIC EXERCISES: CPT

## 2019-07-02 ENCOUNTER — HOSPITAL ENCOUNTER (OUTPATIENT)
Dept: PHYSICAL THERAPY | Facility: CLINIC | Age: 44
Setting detail: THERAPIES SERIES
Discharge: HOME | End: 2019-07-02
Payer: MEDICAID

## 2019-07-02 PROCEDURE — 97110 THERAPEUTIC EXERCISES: CPT

## 2019-07-02 NOTE — FLOWSHEET NOTE
[] [facility]) [facility] &  Therapy  [address]  [phone]  F: [phone] [] [address]  [facility] [address]   [address]  P: [phone]  F: [phone] [] [place]  [address] [facility]  P: [phone]  F: [phone] [] [address]  [facility]   [address]  [place]: [phone]  F: [phone]     Physical Therapy Daily Treatment Note    Date:  2019  Patient Name:  Becky Ericksonr    :  1975  MRN: [id number]  Physician: SUSANNAH Wilcox                               Insurance: [employer] Diagnosis: M25.512 - Acute pain of left shoulder              Rehab Codes: M25.512, M62.81  Onset Date: May 2019                      Next 's appt: 19      Visit# / total visits:  Cancels/No Shows:     Subjective:    Pain:  [x] Yes  [] No Location: Left shoulder  Pain Rating: (0-10 scale) 5/10  Pain altered Tx:  [x] No  [] Yes  Action:    Comments: pt reports that htings are coming along. Pain is down some. Objective:  Modalities: CP end treatment 10 min  Precautions:  Exercises:  Exercise Reps/ Time Weight/ Level Comments   UBE 3\"/3\" Lv 1.5 Not today due to availability. Supine      Cane shoulder AAROM (flex, abduction, ER) 15x5\" ea   Given as part of HEP   Sleeper stretch 10x10\"           Seated      Scapular retraction 15x5\"   Given as part of HEP   IR stretch behind back with towel to midline. 15x                 Prone      Rows 6x6\"  Tactile cueing to ensure scap retraction.     T's 6x6\"      Y's 6x6\"     I's 6x6\"     Ext 6x6\"           Standing      Tband rows 15x Blueberry     Tband ext 15x Blueberry     Tband ER 15x  bilateral             Ball on wall ROM 10xea  Flexion, scaption, abduction    Ball on wall stabilization  20xea

## 2019-07-09 ENCOUNTER — HOSPITAL ENCOUNTER (OUTPATIENT)
Dept: PHYSICAL THERAPY | Facility: CLINIC | Age: 44
Setting detail: THERAPIES SERIES
Discharge: HOME | End: 2019-07-09
Payer: MEDICAID

## 2019-07-09 PROCEDURE — 97110 THERAPEUTIC EXERCISES: CPT

## 2019-07-09 NOTE — FLOWSHEET NOTE
2.2# Flexion, scaption, abduction    Ball on wall stabilization  20xea 2.2# CW, CCW in scaption plane    Rhythmic stabilization  3x20\" lil red Not today         Postural education     Pt educated on decreasing rounded shoulder posture to decrease further impingement in front of left shoulder             Other:     Specific Instructions for next treatment: postural awareness, progress shoulder strengthening and ROM exercises, scapular stabilization             Treatment Charges: Mins Units   [x]  Modalities CP 10  -   [x]  Ther Exercise 35 2   []  Manual Therapy     []  Ther Activities     []  Aquatics     []  Vasocompression     []  Other     Total Treatment time 35 2       Assessment: [] Progressing toward goals. Did not complete all exercises due to pts late arrival. Good tolerance to exercises completed. Increased ball on the wall activities by using a weighted ball today. [] No change. [] Other:    STG: (to be met in 6 treatments)  1. ? Pain: Pt will report decreased left shoulder pain to 5/10 following reaching overhead and reaching behind his back. 2. ? ROM: Pt will increase left shoulder AROM by 20 degrees in flexion and abduction in order to reach overhead. 3. ? Strength: Pt will increase left shoulder strength to 5/5 globally in order to lift and carry objects. 4. ? Function:   5. Independent with Home Exercise Programs  LTG: (to be met in 12 treatments)  6. ? Pain: Pt will report decreased left shoulder pain to 2/10 following reaching overhead and reaching behind his back. 7. ? ROM: Pt will increase left shoulder AROM by 30 degrees in flexion and abduction in order to reach overhead. 8. ? Function: Pt will demonstrate improved functional activity tolerance as evident by an improved score on the UEFI to less than 15% impairment.   9. Pt will no longer report instances of his left upper extremity going numb.                    Patient goals:        Pt. Education:  [x] Yes  [] No  [x] Reviewed Prior HEP/Ed  Method of Education: [x] Verbal  [] Demo  [] Written  6/19 - Sleeper stretch  Comprehension of Education:  [x] Verbalizes understanding. [x] Demonstrates understanding. [x] Needs review. [] Demonstrates/verbalizes HEP/Ed previously given. Plan: [x] Continue per plan of care.    [] Other:      Time In: 1:20 pm            Time Out: 2:05    Electronically signed by:  Christal Bass PTA

## 2019-07-11 ENCOUNTER — HOSPITAL ENCOUNTER (OUTPATIENT)
Dept: PHYSICAL THERAPY | Facility: CLINIC | Age: 44
Setting detail: THERAPIES SERIES
Discharge: HOME | End: 2019-07-11
Payer: MEDICAID

## 2019-07-11 PROCEDURE — 97110 THERAPEUTIC EXERCISES: CPT

## 2019-07-11 NOTE — FLOWSHEET NOTE
[] [facility]) - [facility] &  Therapy  [address]  [phone]  F: [phone] [x] [address]  [facility] 36   [address]  P: [phone]  F: [phone] [] [address] &  Therapy  [address]  P: [phone]  F: [phone] [] [address]  [facility]   [address]  [place]: [phone]  F: [phone]     Physical Therapy Daily Treatment Note    Date:  2019  Patient Name:  Mecca Chirinos    :  1975  MRN: [id number]  Physician: SUSANNAH Lawson                               Insurance: [employer] Diagnosis: M25.512 - Acute pain of left shoulder              Rehab Codes: M25.512, M62.81  Onset Date: May 2019                      Next 's appt: 19  Visit# / total visits:  Cancels/No Shows:     Subjective:    Pain:  [x] Yes  [] No Location: Left shoulder  Pain Rating: (0-10 scale) 4/10  Pain altered Tx:  [x] No  [] Yes  Action:    Comments: Missed appointment earlier this AM. Patient states that his pain is \"cool\" and he is getting better. Objective:  Modalities: CP end treatment 10 min -not today  Precautions:  Exercises:  Exercise Reps/ Time Weight/ Level Comments   UBE 3\"/3\" Lv 1.5          Supine      Cane shoulder AAROM (flex, abduction, ER) 15x5\" ea  3# cane  Given as part of HEP   Chest press 15x 3# cane  Increased weight 7/11   Protraction  15x 3# cane  Increased weight 7/11         Sleeper stretch 10x10\"           Seated   Not today    Scapular retraction 15x5\"   Given as part of HEP   IR stretch behind back with towel to midline. 15x                 Prone      Rows 6x6\"  Tactile cueing to ensure scap retraction.     T's 6x6\"      Y's 6x6\"     I's 6x6\"     Ext 6x6\"           Standing      Tband rows 15x Blueberry     Tband ext 15x Education:  [x] Yes  [] No  [x] Reviewed Prior HEP/Ed  Method of Education: [x] Verbal  [] Demo  [] Written  6/19 - Sleeper stretch  Comprehension of Education:  [x] Verbalizes understanding. [x] Demonstrates understanding. [x] Needs review. [] Demonstrates/verbalizes HEP/Ed previously given. Plan: [x] Continue per plan of care.    [] Other:      Time In: 3:00 pm            Time Out: 3:48 pm    Electronically signed by:  Hoa Eisenberg PTA

## 2019-07-16 ENCOUNTER — HOSPITAL ENCOUNTER (OUTPATIENT)
Dept: PHYSICAL THERAPY | Facility: CLINIC | Age: 44
Setting detail: THERAPIES SERIES
Discharge: HOME | End: 2019-07-16
Payer: MEDICAID

## 2019-07-17 ENCOUNTER — OFFICE VISIT (OUTPATIENT)
Dept: PODIATRY | Age: 44
End: 2019-07-17
Payer: MEDICAID

## 2019-07-17 VITALS — BODY MASS INDEX: 32.73 KG/M2 | RESPIRATION RATE: 16 BRPM | WEIGHT: 221 LBS | HEIGHT: 69 IN

## 2019-07-17 DIAGNOSIS — D23.72 BENIGN NEOPLASM OF SKIN OF LEFT FOOT: Primary | ICD-10-CM

## 2019-07-17 DIAGNOSIS — M79.604 PAIN IN BOTH LOWER EXTREMITIES: ICD-10-CM

## 2019-07-17 DIAGNOSIS — G60.9 IDIOPATHIC PERIPHERAL NEUROPATHY: ICD-10-CM

## 2019-07-17 DIAGNOSIS — B35.1 DERMATOPHYTOSIS OF NAIL: ICD-10-CM

## 2019-07-17 DIAGNOSIS — M79.605 PAIN IN BOTH LOWER EXTREMITIES: ICD-10-CM

## 2019-07-17 PROCEDURE — 17110 DESTRUCTION B9 LES UP TO 14: CPT | Performed by: PODIATRIST

## 2019-07-17 PROCEDURE — 99213 OFFICE O/P EST LOW 20 MIN: CPT | Performed by: PODIATRIST

## 2019-07-17 PROCEDURE — 11721 DEBRIDE NAIL 6 OR MORE: CPT | Performed by: PODIATRIST

## 2019-07-17 PROCEDURE — 4004F PT TOBACCO SCREEN RCVD TLK: CPT | Performed by: PODIATRIST

## 2019-07-17 PROCEDURE — G8428 CUR MEDS NOT DOCUMENT: HCPCS | Performed by: PODIATRIST

## 2019-07-17 PROCEDURE — G8417 CALC BMI ABV UP PARAM F/U: HCPCS | Performed by: PODIATRIST

## 2019-07-17 NOTE — PROGRESS NOTES
Take 1 capsule by mouth daily 90 capsule 3    ammonium lactate (LAC-HYDRIN) 12 % cream Apply topically as needed. 1 Bottle 4    omeprazole (PRILOSEC) 20 MG delayed release capsule Take one tablet daily for acid indigestion and heartburn 30 capsule 5     No current facility-administered medications on file prior to visit. Review of Systems    Review of Systems:   History obtained from chart review and the patient  General ROS: negative for - chills, fatigue, fever, night sweats or weight gain  Constitutional: Negative for chills, diaphoresis, fatigue, fever and unexpected weight change. Musculoskeletal: Positive for arthralgias, gait problem and joint swelling. Neurological ROS: negative for - behavioral changes, confusion, headaches or seizures. Negative for weakness and numbness. Dermatological ROS: negative for - mole changes, rash  Cardiovascular: Negative for leg swelling. Gastrointestinal: Negative for constipation, diarrhea, nausea and vomiting. Objective:  General: AAO x 3 in NAD.     Derm  Toenail Description  Sites of Onychomycosis Involvement (Check affected area)  [x] [x] [x] [x] [x] [x] [x] [x] [x] [x]  5 4 3 2 1 1 2 3 4 5                          Right                                        Left    Thickness  [x] [x] [x] [x] [x] [x] [x] [x] [x] [x]  5 4 3 2 1 1 2 3 4 5                         Right                                        Left    Dystrophic Changes   [x] [x] [x] [x] [x] [x] [x] [x] [x] [x]  5 4 3 2 1 1 2 3 4 5                         Right                                        Left    Color  [x] [x] [x] [x] [x] [x] [x] [x] [x] [x]  5 4 3 2 1 1 2 3 4 5                          Right                                        Left    Incurvation/Ingrowin   [] [] [] [] [] [] [] [] [] []  5 4 3 2 1 1 2 3 4 5                         Right                                        Left    Inflammation/Pain   [x] [x] [x] [x] [x] [x] [x] [x] [x] [x]  5 4 3  2 1 1 2 3 4 5

## 2019-07-18 ENCOUNTER — APPOINTMENT (OUTPATIENT)
Dept: PHYSICAL THERAPY | Facility: CLINIC | Age: 44
End: 2019-07-18
Payer: MEDICAID

## 2019-08-07 NOTE — DISCHARGE SUMMARY
[] [address]  [address]  [phone]  F: [phone] [x] [address]  [facility]\"" 36   [address]  P: [phone]  F: [phone] [] [place]  [address] [facility]  P: [phone]  F: [phone] [] [address]  [facility]   [address]   [place]: [phone]  F: [phone]     Physical Therapy Discharge Note    Date: 2019      Patient: Luís Casey  : 1975  MRN: [id number]    [address] [place] [address], [facility]                               Insurance: [employer] Diagnosis: M25.512 - Acute pain of left shoulder              Rehab Codes: M25.512, M62.81  Onset Date: May 1899                      MULUGETA Blanco's appt: 19  Visit# / total visits:         Cancels/No Shows: 3/1  Date of initial visit: 19                Date of final visit: 19      Subjective:  Unplanned discharge. Pt cancelled remaining treatment appointments and failed to call and reschedule. Pt is discharged at this time. Assessment:  STG: (to be met in 6 treatments)  1. ? Pain: Pt will report decreased left shoulder pain to 5/10 following reaching overhead and reaching behind his back. met   2. ? ROM: Pt will increase left shoulder AROM by 20 degrees in flexion and abduction in order to reach overhead. met   3. ? Strength: Pt will increase left shoulder strength to 5/5 globally in order to lift and carry objects. Partially met 4+/5    4. ? Function:   5. Independent with Home Exercise Programs met   LTG: (to be met in 12 treatments)  6. ? Pain: Pt will report decreased left shoulder pain to 2/10 following reaching overhead and reaching behind his back.   7. ? ROM: Pt will increase left shoulder AROM by 30 degrees in flexion and

## 2019-08-20 ENCOUNTER — TELEPHONE (OUTPATIENT)
Dept: UROLOGY | Age: 44
End: 2019-08-20

## 2019-08-22 ENCOUNTER — OFFICE VISIT (OUTPATIENT)
Dept: FAMILY MEDICINE CLINIC | Age: 44
End: 2019-08-22
Payer: MEDICAID

## 2019-08-22 VITALS
TEMPERATURE: 98.5 F | BODY MASS INDEX: 33.08 KG/M2 | OXYGEN SATURATION: 95 % | HEART RATE: 103 BPM | DIASTOLIC BLOOD PRESSURE: 78 MMHG | WEIGHT: 224 LBS | SYSTOLIC BLOOD PRESSURE: 128 MMHG

## 2019-08-22 DIAGNOSIS — G63 POLYNEUROPATHY ASSOCIATED WITH UNDERLYING DISEASE (HCC): ICD-10-CM

## 2019-08-22 DIAGNOSIS — Z76.0 MEDICATION REFILL: ICD-10-CM

## 2019-08-22 DIAGNOSIS — M54.2 NECK PAIN: ICD-10-CM

## 2019-08-22 DIAGNOSIS — I10 ESSENTIAL HYPERTENSION: Primary | ICD-10-CM

## 2019-08-22 PROCEDURE — G8427 DOCREV CUR MEDS BY ELIG CLIN: HCPCS | Performed by: NURSE PRACTITIONER

## 2019-08-22 PROCEDURE — 99214 OFFICE O/P EST MOD 30 MIN: CPT | Performed by: NURSE PRACTITIONER

## 2019-08-22 PROCEDURE — G8417 CALC BMI ABV UP PARAM F/U: HCPCS | Performed by: NURSE PRACTITIONER

## 2019-08-22 PROCEDURE — 4004F PT TOBACCO SCREEN RCVD TLK: CPT | Performed by: NURSE PRACTITIONER

## 2019-08-22 RX ORDER — LISINOPRIL AND HYDROCHLOROTHIAZIDE 12.5; 1 MG/1; MG/1
TABLET ORAL
Qty: 30 TABLET | Refills: 5 | Status: SHIPPED | OUTPATIENT
Start: 2019-08-22 | End: 2020-01-06 | Stop reason: SDUPTHER

## 2019-08-22 RX ORDER — METHYLPREDNISOLONE 4 MG/1
TABLET ORAL
Qty: 1 KIT | Refills: 0 | Status: SHIPPED | OUTPATIENT
Start: 2019-08-22 | End: 2019-08-28

## 2019-08-22 RX ORDER — L-METHYLFOLATE-ALGAE-VIT B12-B6 CAP 3-90.314-2-35 MG 3-90.314-2-35 MG
1 CAP ORAL DAILY
Qty: 30 CAPSULE | Refills: 5 | Status: SHIPPED | OUTPATIENT
Start: 2019-08-22 | End: 2020-01-06 | Stop reason: SDUPTHER

## 2019-08-22 ASSESSMENT — ENCOUNTER SYMPTOMS
SHORTNESS OF BREATH: 0
COUGH: 0

## 2019-08-29 ENCOUNTER — PROCEDURE VISIT (OUTPATIENT)
Dept: UROLOGY | Age: 44
End: 2019-08-29
Payer: MEDICAID

## 2019-08-29 VITALS
SYSTOLIC BLOOD PRESSURE: 112 MMHG | HEART RATE: 80 BPM | TEMPERATURE: 98.8 F | DIASTOLIC BLOOD PRESSURE: 73 MMHG | WEIGHT: 222.66 LBS | BODY MASS INDEX: 32.98 KG/M2 | HEIGHT: 69 IN

## 2019-08-29 DIAGNOSIS — N13.8 BENIGN PROSTATIC HYPERPLASIA WITH URINARY OBSTRUCTION: Primary | ICD-10-CM

## 2019-08-29 DIAGNOSIS — N40.1 BENIGN PROSTATIC HYPERPLASIA WITH URINARY OBSTRUCTION: Primary | ICD-10-CM

## 2019-08-29 PROCEDURE — 52000 CYSTOURETHROSCOPY: CPT | Performed by: UROLOGY

## 2019-09-11 ENCOUNTER — TELEPHONE (OUTPATIENT)
Dept: FAMILY MEDICINE CLINIC | Age: 44
End: 2019-09-11

## 2019-09-13 ENCOUNTER — TELEPHONE (OUTPATIENT)
Dept: UROLOGY | Age: 44
End: 2019-09-13

## 2019-09-13 ENCOUNTER — TELEPHONE (OUTPATIENT)
Dept: FAMILY MEDICINE CLINIC | Age: 44
End: 2019-09-13

## 2019-09-13 NOTE — TELEPHONE ENCOUNTER
Pt was calling in due to pain in his thumb, and medicine was denied for his feet. He would just like to know if there is an alternative.

## 2019-09-17 NOTE — TELEPHONE ENCOUNTER
Spoke with patient and he would like to get the xray done. Patient would also like to see a podiatrist. Gave patient Dr. Hopkins Lack number.

## 2019-09-18 ENCOUNTER — OFFICE VISIT (OUTPATIENT)
Dept: PODIATRY | Age: 44
End: 2019-09-18
Payer: MEDICAID

## 2019-09-18 VITALS — BODY MASS INDEX: 35.31 KG/M2 | WEIGHT: 225 LBS | RESPIRATION RATE: 16 BRPM | HEIGHT: 67 IN

## 2019-09-18 DIAGNOSIS — M79.605 PAIN IN BOTH LOWER EXTREMITIES: ICD-10-CM

## 2019-09-18 DIAGNOSIS — D23.72 BENIGN NEOPLASM OF SKIN OF LEFT FOOT: Primary | ICD-10-CM

## 2019-09-18 DIAGNOSIS — B35.1 DERMATOPHYTOSIS OF NAIL: ICD-10-CM

## 2019-09-18 DIAGNOSIS — M79.604 PAIN IN BOTH LOWER EXTREMITIES: ICD-10-CM

## 2019-09-18 DIAGNOSIS — L60.0 INGROWN NAIL: ICD-10-CM

## 2019-09-18 DIAGNOSIS — G60.9 IDIOPATHIC PERIPHERAL NEUROPATHY: ICD-10-CM

## 2019-09-18 PROCEDURE — G8427 DOCREV CUR MEDS BY ELIG CLIN: HCPCS | Performed by: PODIATRIST

## 2019-09-18 PROCEDURE — 4004F PT TOBACCO SCREEN RCVD TLK: CPT | Performed by: PODIATRIST

## 2019-09-18 PROCEDURE — 17110 DESTRUCTION B9 LES UP TO 14: CPT | Performed by: PODIATRIST

## 2019-09-18 PROCEDURE — 11721 DEBRIDE NAIL 6 OR MORE: CPT | Performed by: PODIATRIST

## 2019-09-18 PROCEDURE — G8417 CALC BMI ABV UP PARAM F/U: HCPCS | Performed by: PODIATRIST

## 2019-09-18 NOTE — PROGRESS NOTES
ammonium lactate (LAC-HYDRIN) 12 % cream Apply topically as needed. 1 Bottle 4    omeprazole (PRILOSEC) 20 MG delayed release capsule Take one tablet daily for acid indigestion and heartburn 30 capsule 5     No current facility-administered medications on file prior to visit. Review of Systems    Review of Systems:   History obtained from chart review and the patient  General ROS: negative for - chills, fatigue, fever, night sweats or weight gain  Constitutional: Negative for chills, diaphoresis, fatigue, fever and unexpected weight change. Musculoskeletal: Positive for arthralgias, gait problem and joint swelling. Neurological ROS: negative for - behavioral changes, confusion, headaches or seizures. Negative for weakness and numbness. Dermatological ROS: negative for - mole changes, rash  Cardiovascular: Negative for leg swelling. Gastrointestinal: Negative for constipation, diarrhea, nausea and vomiting. Objective:  General: AAO x 3 in NAD.     Derm  Toenail Description  Sites of Onychomycosis Involvement (Check affected area)  [x] [x] [x] [x] [x] [x] [x] [x] [x] [x]  5 4 3 2 1 1 2 3 4 5                          Right                                        Left    Thickness  [x] [x] [x] [x] [x] [x] [x] [x] [x] [x]  5 4 3 2 1 1 2 3 4 5                         Right                                        Left    Dystrophic Changes   [x] [x] [x] [x] [x] [x] [x] [x] [x] [x]  5 4 3 2 1 1 2 3 4 5                         Right                                        Left    Color  [x] [x] [x] [x] [x] [x] [x] [x] [x] [x]  5 4 3 2 1 1 2 3 4 5                          Right                                        Left    Incurvation/Ingrowin   [] [] [] [] [] [] [] [] [] []  5 4 3 2 1 1 2 3 4 5                         Right                                        Left    Inflammation/Pain   [x] [x] [x] [x] [x] [x] [x] [x] [x] [x]  5 4 3  2 1 1 2 3 4 5                         Right

## 2019-09-27 ENCOUNTER — OFFICE VISIT (OUTPATIENT)
Dept: FAMILY MEDICINE CLINIC | Age: 44
End: 2019-09-27
Payer: MEDICAID

## 2019-09-27 VITALS
OXYGEN SATURATION: 98 % | WEIGHT: 222.8 LBS | BODY MASS INDEX: 34.9 KG/M2 | SYSTOLIC BLOOD PRESSURE: 130 MMHG | TEMPERATURE: 98.2 F | DIASTOLIC BLOOD PRESSURE: 80 MMHG | HEART RATE: 92 BPM

## 2019-09-27 DIAGNOSIS — R20.2 NUMBNESS AND TINGLING OF RIGHT THUMB: Primary | ICD-10-CM

## 2019-09-27 DIAGNOSIS — Z76.0 MEDICATION REFILL: ICD-10-CM

## 2019-09-27 DIAGNOSIS — R20.0 NUMBNESS AND TINGLING OF RIGHT THUMB: Primary | ICD-10-CM

## 2019-09-27 PROCEDURE — G8417 CALC BMI ABV UP PARAM F/U: HCPCS | Performed by: NURSE PRACTITIONER

## 2019-09-27 PROCEDURE — 99213 OFFICE O/P EST LOW 20 MIN: CPT | Performed by: NURSE PRACTITIONER

## 2019-09-27 PROCEDURE — G8427 DOCREV CUR MEDS BY ELIG CLIN: HCPCS | Performed by: NURSE PRACTITIONER

## 2019-09-27 PROCEDURE — 4004F PT TOBACCO SCREEN RCVD TLK: CPT | Performed by: NURSE PRACTITIONER

## 2019-09-27 RX ORDER — POTASSIUM BICARBONATE 25 MEQ/1
TABLET, EFFERVESCENT ORAL
Qty: 60 TABLET | Refills: 5 | Status: SHIPPED | OUTPATIENT
Start: 2019-09-27 | End: 2020-02-24

## 2019-09-27 RX ORDER — OMEPRAZOLE 20 MG/1
CAPSULE, DELAYED RELEASE ORAL
Qty: 30 CAPSULE | Refills: 5 | Status: SHIPPED | OUTPATIENT
Start: 2019-09-27 | End: 2020-05-22

## 2019-09-27 ASSESSMENT — ENCOUNTER SYMPTOMS
COUGH: 0
SHORTNESS OF BREATH: 0

## 2019-09-27 NOTE — PROGRESS NOTES
ANKLE SURGERY Left     fracture    ARTHROPLASTY      4th and 5th toe on left foot    COLONOSCOPY  12/22/2014    normal    CYSTOSCOPY  03/21/2017    retrograde    CYSTOSCOPY Bilateral 3/21/2017    [facility]    left foot 5th digit derotational arthroplasty     Family History   Problem Relation Age of Onset    High Blood Pressure Mother     Diabetes Father      Social History     Tobacco Use    Smoking status: Current Some Day Smoker     Types: Cigars    Smokeless tobacco: Never Used    Tobacco comment: occassionally   Substance Use Topics    Alcohol use: Yes     Alcohol/week: 4.2 standard drinks     Types: 5 Standard drinks or equivalent per week     Comment: weekends/ 6 drinks      No Known Allergies    Subjective:      Review of Systems   Constitutional: Negative for chills and fever. Respiratory: Negative for cough and shortness of breath. Cardiovascular: Negative for chest pain and leg swelling. Other pertinent ROS in HPI  Objective:     /80 (Site: Left Upper Arm, Position: Sitting, Cuff Size: Large Adult)   Pulse 92   Temp 98.2 °F (36.8 °C) (Oral)   Wt 222 lb 12.8 oz (101.1 kg)   SpO2 98%   BMI 34.90 kg/m²    Physical Exam   Constitutional: He is oriented to person, place, and time. He appears well-developed and well-nourished. No distress. HENT:   Head: Normocephalic. Right Ear: External ear normal.   Left Ear: External ear normal.   Eyes: EOM are normal.   Cardiovascular: Normal rate, regular rhythm and normal heart sounds. Pulmonary/Chest: Effort normal and breath sounds normal.   Musculoskeletal: Normal range of motion. Right hand: He exhibits normal range of motion, no tenderness, no bony tenderness, normal two-point discrimination, normal capillary refill, no deformity, no laceration and no swelling. Normal sensation noted. Normal strength noted.    Neurological: He is

## 2019-11-12 DIAGNOSIS — R20.2 NUMBNESS AND TINGLING OF RIGHT THUMB: ICD-10-CM

## 2019-11-12 DIAGNOSIS — R20.0 NUMBNESS AND TINGLING OF RIGHT THUMB: ICD-10-CM

## 2019-11-20 ENCOUNTER — OFFICE VISIT (OUTPATIENT)
Dept: PODIATRY | Age: 44
End: 2019-11-20
Payer: MEDICAID

## 2019-11-20 VITALS — RESPIRATION RATE: 16 BRPM | WEIGHT: 222 LBS | HEIGHT: 67 IN | BODY MASS INDEX: 34.84 KG/M2

## 2019-11-20 DIAGNOSIS — R26.2 DIFFICULTY WALKING: ICD-10-CM

## 2019-11-20 DIAGNOSIS — M79.605 PAIN IN BOTH LOWER EXTREMITIES: ICD-10-CM

## 2019-11-20 DIAGNOSIS — B35.1 DERMATOPHYTOSIS OF NAIL: ICD-10-CM

## 2019-11-20 DIAGNOSIS — L85.3 XEROSIS CUTIS: ICD-10-CM

## 2019-11-20 DIAGNOSIS — D23.72 BENIGN NEOPLASM OF SKIN OF LEFT FOOT: Primary | ICD-10-CM

## 2019-11-20 DIAGNOSIS — M79.604 PAIN IN BOTH LOWER EXTREMITIES: ICD-10-CM

## 2019-11-20 PROCEDURE — 99213 OFFICE O/P EST LOW 20 MIN: CPT | Performed by: PODIATRIST

## 2019-11-20 PROCEDURE — 17110 DESTRUCTION B9 LES UP TO 14: CPT | Performed by: PODIATRIST

## 2019-11-20 PROCEDURE — G8428 CUR MEDS NOT DOCUMENT: HCPCS | Performed by: PODIATRIST

## 2019-11-20 PROCEDURE — 11721 DEBRIDE NAIL 6 OR MORE: CPT | Performed by: PODIATRIST

## 2019-11-20 PROCEDURE — 4004F PT TOBACCO SCREEN RCVD TLK: CPT | Performed by: PODIATRIST

## 2019-11-20 PROCEDURE — G8484 FLU IMMUNIZE NO ADMIN: HCPCS | Performed by: PODIATRIST

## 2019-11-20 PROCEDURE — G8417 CALC BMI ABV UP PARAM F/U: HCPCS | Performed by: PODIATRIST

## 2019-11-20 RX ORDER — UREA 40 %
CREAM (GRAM) TOPICAL
Qty: 1 TUBE | Refills: 3 | Status: SHIPPED | OUTPATIENT
Start: 2019-11-20 | End: 2020-08-05 | Stop reason: SDUPTHER

## 2019-11-25 RX ORDER — UREA 20 %
CREAM (GRAM) TOPICAL
Qty: 85 G | Refills: 3 | Status: SHIPPED | OUTPATIENT
Start: 2019-11-25 | End: 2021-11-18 | Stop reason: SDUPTHER

## 2019-12-05 ENCOUNTER — TELEPHONE (OUTPATIENT)
Dept: PODIATRY | Age: 44
End: 2019-12-05

## 2019-12-30 ENCOUNTER — TELEPHONE (OUTPATIENT)
Dept: FAMILY MEDICINE CLINIC | Age: 44
End: 2019-12-30

## 2020-01-06 ENCOUNTER — OFFICE VISIT (OUTPATIENT)
Dept: FAMILY MEDICINE CLINIC | Age: 45
End: 2020-01-06
Payer: MEDICAID

## 2020-01-06 VITALS
DIASTOLIC BLOOD PRESSURE: 86 MMHG | SYSTOLIC BLOOD PRESSURE: 136 MMHG | OXYGEN SATURATION: 97 % | WEIGHT: 253.6 LBS | TEMPERATURE: 98.6 F | HEART RATE: 90 BPM | BODY MASS INDEX: 39.72 KG/M2

## 2020-01-06 PROCEDURE — G8484 FLU IMMUNIZE NO ADMIN: HCPCS | Performed by: NURSE PRACTITIONER

## 2020-01-06 PROCEDURE — G8417 CALC BMI ABV UP PARAM F/U: HCPCS | Performed by: NURSE PRACTITIONER

## 2020-01-06 PROCEDURE — 4004F PT TOBACCO SCREEN RCVD TLK: CPT | Performed by: NURSE PRACTITIONER

## 2020-01-06 PROCEDURE — G8427 DOCREV CUR MEDS BY ELIG CLIN: HCPCS | Performed by: NURSE PRACTITIONER

## 2020-01-06 PROCEDURE — 99214 OFFICE O/P EST MOD 30 MIN: CPT | Performed by: NURSE PRACTITIONER

## 2020-01-06 RX ORDER — L-METHYLFOLATE-ALGAE-VIT B12-B6 CAP 3-90.314-2-35 MG 3-90.314-2-35 MG
1 CAP ORAL DAILY
Qty: 30 CAPSULE | Refills: 5 | Status: SHIPPED | OUTPATIENT
Start: 2020-01-06 | End: 2020-05-22

## 2020-01-06 RX ORDER — LISINOPRIL AND HYDROCHLOROTHIAZIDE 12.5; 1 MG/1; MG/1
TABLET ORAL
Qty: 30 TABLET | Refills: 5 | Status: SHIPPED | OUTPATIENT
Start: 2020-01-06 | End: 2020-05-22 | Stop reason: SDUPTHER

## 2020-01-06 ASSESSMENT — ENCOUNTER SYMPTOMS
BACK PAIN: 1
COUGH: 0
SHORTNESS OF BREATH: 0

## 2020-01-06 ASSESSMENT — PATIENT HEALTH QUESTIONNAIRE - PHQ9
2. FEELING DOWN, DEPRESSED OR HOPELESS: 0
SUM OF ALL RESPONSES TO PHQ9 QUESTIONS 1 & 2: 0
SUM OF ALL RESPONSES TO PHQ QUESTIONS 1-9: 0
SUM OF ALL RESPONSES TO PHQ QUESTIONS 1-9: 0
1. LITTLE INTEREST OR PLEASURE IN DOING THINGS: 0

## 2020-01-06 NOTE — PROGRESS NOTES
Patient is present complaining of low back pain  Patient states this has been going on for a few years; states he fell down stairs  Patient states the pain gets worse when he lays a certain way    Pain level 7/10    Pharmacy and medication reviewed with patient

## 2020-01-15 ENCOUNTER — HOSPITAL ENCOUNTER (OUTPATIENT)
Dept: MRI IMAGING | Age: 45
Discharge: HOME | End: 2020-01-17
Payer: MEDICAID

## 2020-01-15 PROCEDURE — 72148 MRI LUMBAR SPINE W/O DYE: CPT

## 2020-01-17 ENCOUNTER — TELEPHONE (OUTPATIENT)
Dept: FAMILY MEDICINE CLINIC | Age: 45
End: 2020-01-17

## 2020-01-28 ENCOUNTER — OFFICE VISIT (OUTPATIENT)
Dept: NEUROSURGERY | Age: 45
End: 2020-01-28
Payer: MEDICAID

## 2020-01-28 ENCOUNTER — HOSPITAL ENCOUNTER (OUTPATIENT)
Age: 45
Discharge: HOME | End: 2020-01-30
Payer: MEDICAID

## 2020-01-28 ENCOUNTER — HOSPITAL ENCOUNTER (OUTPATIENT)
Dept: GENERAL RADIOLOGY | Age: 45
Discharge: HOME | End: 2020-01-30
Payer: MEDICAID

## 2020-01-28 VITALS
DIASTOLIC BLOOD PRESSURE: 83 MMHG | BODY MASS INDEX: 40.4 KG/M2 | OXYGEN SATURATION: 100 % | HEART RATE: 109 BPM | HEIGHT: 67 IN | WEIGHT: 257.4 LBS | SYSTOLIC BLOOD PRESSURE: 131 MMHG

## 2020-01-28 PROCEDURE — 99204 OFFICE O/P NEW MOD 45 MIN: CPT | Performed by: NURSE PRACTITIONER

## 2020-01-28 PROCEDURE — G8484 FLU IMMUNIZE NO ADMIN: HCPCS | Performed by: NURSE PRACTITIONER

## 2020-01-28 PROCEDURE — 72110 X-RAY EXAM L-2 SPINE 4/>VWS: CPT

## 2020-01-28 PROCEDURE — G8427 DOCREV CUR MEDS BY ELIG CLIN: HCPCS | Performed by: NURSE PRACTITIONER

## 2020-01-28 PROCEDURE — G8417 CALC BMI ABV UP PARAM F/U: HCPCS | Performed by: NURSE PRACTITIONER

## 2020-01-28 PROCEDURE — 1036F TOBACCO NON-USER: CPT | Performed by: NURSE PRACTITIONER

## 2020-01-28 NOTE — PROGRESS NOTES
machine    Wears dentures     upper     Past Surgical History:   Procedure Laterality Date    ANKLE SURGERY Left     fracture    ARTHROPLASTY      4th and 5th toe on left foot    COLONOSCOPY  2014    normal    CYSTOSCOPY  2017    retrograde    CYSTOSCOPY Bilateral 3/21/2017    CYSTOSCOPY, RETROGRADE PYELOGRAM performed by Heather Shafer MD at [address] Left 2017    left foot 5th digit derotational arthroplasty     Family History   Problem Relation Age of Onset    High Blood Pressure Mother     Diabetes Father      Current Outpatient Medications on File Prior to Visit   Medication Sig Dispense Refill    lisinopril-hydrochlorothiazide (PRINZIDE;ZESTORETIC) 10-12.5 MG per tablet take 1 tablet by mouth once daily 30 tablet 5    L-methylfolate-B6-B12 (METANX) 6-92.361-6-36 MG CAPS capsule Take 1 capsule by mouth daily 30 capsule 5    UREA 20 INTENSIVE HYDRATING 20 % cream apply to FEET twice a day 85 g 3    Urea (CARMOL) 40 % cream Apply to feet bid 1 Tube 3    omeprazole (PRILOSEC) 20 MG delayed release capsule Take one tablet daily for acid indigestion and heartburn 30 capsule 5    ammonium lactate (LAC-HYDRIN) 12 % cream Apply topically as needed. 1 Bottle 4    Elastic Bandages & Supports (WRIST SPLINT) MISC Apply 1 each topically continuous Cock up wrist splint (Patient not taking: Reported on 2020) 1 each 0    potassium bicarbonate (K-EFFERVESCENT) 25 MEQ disintegrating tablet dissolve 2 tablets INTO 4 TO 8 OUNCES OF WATER DAILY. (Patient not taking: Reported on 2020) 60 tablet 5    tamsulosin (FLOMAX) 0.4 MG capsule Take 1 capsule by mouth daily 90 capsule 3     No current facility-administered medications on file prior to visit.       Social History     Tobacco Use    Smoking status: Former Smoker     Types: Cigars     Last attempt to quit: 2019     Years since quittin.0    Smokeless tobacco: Never Used    Tobacco comment: occassionally symmetric and intact    Sensation: mild diffuse decrease right lower extremity, right thumb    Motor  L deltoid 5/5; R deltoid 5/5  L biceps 5/5; R biceps 5/5  L triceps 5/5; R triceps 5/5  L wrist extension 5/5; R wrist extension 5/5  L intrinsics 5/5; R intrinsics 5/5     L iliopsoas 5/5 , R iliopsoas 5/5  L quadriceps 5/5; R quadriceps 5/5  L Dorsiflexion 5/5; R dorsiflexion 5/5  L Plantarflexion 5/5; R plantarflexion 5/5  L EHL 5/5; R EHL 5/5    Reflexes  L Brachioradialis 2+/4; R brachioradialis 2+/4  L Biceps 2+/4; R Biceps 2+/4  L Triceps 2+/4; R Triceps 2+/4  L Patellar 2+/4: R Patellar 2+/4  L Achilles 2+/4; R Achilles 2+/4    hoffmans L: neg  hoffmans R: neg  Clonus L: neg  Clonus R: neg  Babinski L: neg  Babinski R: neg    MINERVA: negative  Straight leg raise: negative    +Phalens right side  +Tinels right wrist    Studies Review:     MRI 1/15/2020:  Mild bilateral foraminal stenosis at L4-5 and L5-S1    EMG 11/5/2019:  1- Abnormal study of the bilateral lower extremities. There is   electrodiagnostic evidence suggestive of a left peroneal mononeuropathy   across the fibular head plus right S1 radiculopathy.  Needle testing was   not performed per patient request which limits interpretation of the   study.  Clinical correlation is required.  Please see full EMG report. EMG 12/16/2019:  1- Abnormal study of the right upper extremity.  There is   electrodiagnostic evidence of very mild right carpal tunnel   syndrome(median nerve entrapment at wrist).  There is no electrodiagnostic   evidence of a cervical radiculopathy.  Clinical correlation is required.    Please see full EMG report. Assessment and Plan:      1. Lumbar radiculopathy    2. Right carpal tunnel syndrome    3. Peroneal neuropathy, right          Plan: Patient presents with predominantly axial low back pain for the last several years. Pain is worsened with prolonged standing, walking.   Has completed physical therapy without any improvement in symptoms. No focal motor deficits or red flag symptoms present. Pain is present on a daily basis. EMG did show findings of chronic right side S1 neuropathy as well as left peroneal neuropathy. Patient however exhibiting minimal leg symptoms apart from bilateral stocking-like numbness to both feet. Recommend evaluation by pain management to discuss potential injections to help with axial symptoms. Was referred to podiatry and neurology regarding bilateral foot symptoms per PCP. Patient does also complain of right thumb numbness. Does have EMG findings consistent with mild median neuropathy. Was recently provided carpal tunnel brace to wear nightly. Has not yet done this. Encouraged to consistently wear this. Will reevaluate in approximately 10 to 12 weeks. Followup: Return in about 3 months (around 4/28/2020), or if symptoms worsen or fail to improve. Prescriptions Ordered:  No orders of the defined types were placed in this encounter. Orders Placed:  Orders Placed This Encounter   Procedures    XR LUMBAR SPINE (MIN 4 VIEWS)     Standing Status:   Future     Number of Occurrences:   1     Standing Expiration Date:   1/28/2021     Order Specific Question:   Reason for exam:     Answer:   back pain   Bev Nichole MD, Pain Management, [facility]     Referral Priority:   Routine     Referral Type:   Eval and Treat     Referral Reason:   Specialty Services Required     Referred to Provider:   Namrata Jones MD     Requested Specialty:   Pain Management     Number of Visits Requested:   1        Electronically signed by Sanford Rinne, APRN - CNP on 1/28/2020 at 3:18 PM    Please note that this chart was generated using voice recognition Dragon dictation software. Although every effort was made to ensure the accuracy of this automated transcription, some errors in transcription may have occurred.

## 2020-02-03 ENCOUNTER — OFFICE VISIT (OUTPATIENT)
Dept: PODIATRY | Age: 45
End: 2020-02-03
Payer: MEDICAID

## 2020-02-03 VITALS — RESPIRATION RATE: 16 BRPM | BODY MASS INDEX: 40.34 KG/M2 | HEIGHT: 67 IN | WEIGHT: 257 LBS

## 2020-02-03 PROCEDURE — 17110 DESTRUCTION B9 LES UP TO 14: CPT | Performed by: PODIATRIST

## 2020-02-03 PROCEDURE — G8484 FLU IMMUNIZE NO ADMIN: HCPCS | Performed by: PODIATRIST

## 2020-02-03 PROCEDURE — 1036F TOBACCO NON-USER: CPT | Performed by: PODIATRIST

## 2020-02-03 PROCEDURE — G8428 CUR MEDS NOT DOCUMENT: HCPCS | Performed by: PODIATRIST

## 2020-02-03 PROCEDURE — G8417 CALC BMI ABV UP PARAM F/U: HCPCS | Performed by: PODIATRIST

## 2020-02-03 PROCEDURE — 11721 DEBRIDE NAIL 6 OR MORE: CPT | Performed by: PODIATRIST

## 2020-02-06 NOTE — PROGRESS NOTES
% cream Apply to feet bid 1 Tube 3    potassium bicarbonate (K-EFFERVESCENT) 25 MEQ disintegrating tablet dissolve 2 tablets INTO 4 TO 8 OUNCES OF WATER DAILY. (Patient not taking: Reported on 1/6/2020) 60 tablet 5    omeprazole (PRILOSEC) 20 MG delayed release capsule Take one tablet daily for acid indigestion and heartburn 30 capsule 5    tamsulosin (FLOMAX) 0.4 MG capsule Take 1 capsule by mouth daily 90 capsule 3    ammonium lactate (LAC-HYDRIN) 12 % cream Apply topically as needed. 1 Bottle 4     No current facility-administered medications on file prior to visit. Review of Systems. Review of Systems:   History obtained from chart review and the patient  General ROS: negative for - chills, fatigue, fever, night sweats or weight gain  Constitutional: Negative for chills, diaphoresis, fatigue, fever and unexpected weight change. Musculoskeletal: Positive for arthralgias, gait problem and joint swelling. Neurological ROS: negative for - behavioral changes, confusion, headaches or seizures. Negative for weakness and numbness. Dermatological ROS: negative for - mole changes, rash  Cardiovascular: Negative for leg swelling. Gastrointestinal: Negative for constipation, diarrhea, nausea and vomiting. Objective:  General: AAO x 3 in NAD.     Derm  Toenail Description  Sites of Onychomycosis Involvement (Check affected area)  [x] [x] [x] [x] [x] [x] [x] [x] [x] [x]  5 4 3 2 1 1 2 3 4 5                          Right                                        Left    Thickness  [x] [x] [x] [x] [x] [x] [x] [x] [x] [x]  5 4 3 2 1 1 2 3 4 5                         Right                                        Left    Dystrophic Changes   [x] [x] [x] [x] [x] [x] [x] [x] [x] [x]  5 4 3 2 1 1 2 3 4 5                         Right                                        Left    Color  [x] [x] [x] [x] [x] [x] [x] [x] [x] [x]  5 4 3 2 1 1 2 3 4 5                          Right Left    Incurvation/Ingrowin   [] [] [] [] [] [] [] [] [] []  5 4 3 2 1 1 2 3 4 5                         Right                                        Left    Inflammation/Pain   [x] [x] [x] [x] [x] [x] [x] [x] [x] [x]  5 4 3  2 1 1 2 3 4 5                         Right                                        Left       Nails are painful 1-10 with direct palpation. Dermatologic:  Soft tissue lesion to the left foot at the 4th interspace and left lateral 5th digit with central core and petechiae. Pain on palpation of lesion. Musculoskeletal:     1st MPJ ROM decreased, Bilateral.  Muscle strength 5/5, Bilateral.  Pain present upon palpation of toenails 1-5, Bilateral. decreased medial longitudinal arch, Bilateral.  Ankle ROM decreased,Bilateral.    Dorsally contracted digits present digits 2, Bilateral.     Vascular: DP and PT pulses palpable 1/4, Bilateral.  CFT <5 seconds, Bilateral.  Hair growth absent to the level of the digits, Bilateral.  Edema present, Bilateral.  Varicosities absent, Bilateral. Erythema absent, Bilateral    Integument: Warm, dry, scaley, Bilateral.  Open lesion absent, Bilateral.  Interdigital maceration absent to web spaces 4, Bilateral.  Nails 1-5 left and 1-5 right thickened > 3.0 mm, dystrophic and crumbly, discolored with subungual debris. Fissures absent, Bilateral.   Neurological:  Sensation present to light touch to level of digits, Bilateral.      Assessment:  38 y.o. male with:    Diagnosis Orders   1. Benign neoplasm of skin of left foot  84290 - [place] DESTRUCTION BENIGN LESIONS UP TO 14   2. Difficulty walking  62103 - [place] DEBRIDEMENT OF NAILS, 6 OR MORE    70599 - [place] DESTRUCTION BENIGN LESIONS UP TO 14   3. Dermatophytosis of nail  21968 - [place] DEBRIDEMENT OF NAILS, 6 OR MORE   4. Heloma molle  38478 - [place] DESTRUCTION BENIGN LESIONS UP TO 14   5.  Pain of left lower extremity  43494 - [place] DEBRIDEMENT OF NAILS, 6 OR MORE    46114 - [place] DESTRUCTION BENIGN LESIONS UP TO 14

## 2020-02-24 ENCOUNTER — OFFICE VISIT (OUTPATIENT)
Dept: FAMILY MEDICINE CLINIC | Age: 45
End: 2020-02-24
Payer: MEDICAID

## 2020-02-24 VITALS
SYSTOLIC BLOOD PRESSURE: 124 MMHG | WEIGHT: 255.8 LBS | BODY MASS INDEX: 40.06 KG/M2 | OXYGEN SATURATION: 97 % | HEART RATE: 88 BPM | TEMPERATURE: 97.2 F | DIASTOLIC BLOOD PRESSURE: 78 MMHG

## 2020-02-24 PROBLEM — E66.01 MORBIDLY OBESE (HCC): Status: ACTIVE | Noted: 2020-02-24

## 2020-02-24 PROBLEM — F10.929 ACUTE ALCOHOL INTOXICATION (HCC): Status: RESOLVED | Noted: 2017-12-17 | Resolved: 2020-02-24

## 2020-02-24 LAB — HBA1C MFR BLD: 8.1 %

## 2020-02-24 PROCEDURE — 99214 OFFICE O/P EST MOD 30 MIN: CPT | Performed by: NURSE PRACTITIONER

## 2020-02-24 PROCEDURE — 83036 HEMOGLOBIN GLYCOSYLATED A1C: CPT | Performed by: NURSE PRACTITIONER

## 2020-02-24 PROCEDURE — G8417 CALC BMI ABV UP PARAM F/U: HCPCS | Performed by: NURSE PRACTITIONER

## 2020-02-24 PROCEDURE — 3052F HG A1C>EQUAL 8.0%<EQUAL 9.0%: CPT | Performed by: NURSE PRACTITIONER

## 2020-02-24 PROCEDURE — 1036F TOBACCO NON-USER: CPT | Performed by: NURSE PRACTITIONER

## 2020-02-24 PROCEDURE — G8484 FLU IMMUNIZE NO ADMIN: HCPCS | Performed by: NURSE PRACTITIONER

## 2020-02-24 PROCEDURE — G8427 DOCREV CUR MEDS BY ELIG CLIN: HCPCS | Performed by: NURSE PRACTITIONER

## 2020-02-24 PROCEDURE — 2022F DILAT RTA XM EVC RTNOPTHY: CPT | Performed by: NURSE PRACTITIONER

## 2020-02-24 ASSESSMENT — ENCOUNTER SYMPTOMS
SHORTNESS OF BREATH: 0
COUGH: 0

## 2020-02-24 NOTE — PROGRESS NOTES
[address] [place]  [phone]    Leopoldo Ripple is a 38 y.o. male who presents today for his  medical conditions/complaints as noted below. Leopoldo Ripple is c/o of Hypertension  . HPI:     HPI   Hypertension: Patient here for follow-up of elevated blood pressure. He is not exercising and is adherent to low salt diet. Blood pressure is well controlled at home. Cardiac symptoms none. Patient denies chest pain, chest pressure/discomfort, exertional chest pressure/discomfort, irregular heart beat and lower extremity edema. Cardiovascular risk factors: diabetes mellitus, hypertension, male gender, obesity (BMI >= 30 kg/m2) and sedentary lifestyle. Use of agents associated with hypertension: none. History of target organ damage: none. Dm check- diet and exercise controlled. \"been on a roller coaster with food\". He is drinking pop and juice. Couple bottles a day. He would prefer to make dietary changes vs start a medication. Augustine Lopez admits that he eats a fair amount of carbohydrates. He is going to talk to his girlfriend about this and about making some dietary changes. Wt Readings from Last 3 Encounters:   02/24/20 255 lb 12.8 oz (116 kg)   02/03/20 257 lb (116.6 kg)   01/28/20 257 lb 6.4 oz (116.8 kg)       Nursing note reviewed and discussed with patient. Patient's medications, allergies, past medical, surgical, social and family histories were reviewed and updated asappropriate.     Current Outpatient Medications   Medication Sig Dispense Refill    lisinopril-hydrochlorothiazide (PRINZIDE;ZESTORETIC) 10-12.5 MG per tablet take 1 tablet by mouth once daily 30 tablet 5    L-methylfolate-B6-B12 (METANX) 0-86.403-6-02 MG CAPS capsule Take 1 capsule by mouth daily 30 capsule 5    Elastic Bandages & Supports (WRIST SPLINT) MISC Apply 1 each topically continuous Cock up wrist splint 1 each 0    UREA 20 INTENSIVE HYDRATING 20 % cream apply to FEET twice a day 85 g 3    Urea (CARMOL) 40 % cream Apply to feet bid 1 Tube 3    omeprazole (PRILOSEC) 20 MG delayed release capsule Take one tablet daily for acid indigestion and heartburn 30 capsule 5    tamsulosin (FLOMAX) 0.4 MG capsule Take 1 capsule by mouth daily 90 capsule 3    ammonium lactate (LAC-HYDRIN) 12 % cream Apply topically as needed. 1 Bottle 4     No current facility-administered medications for this visit. Past Medical History:   Diagnosis Date    Dry skin     GERD (gastroesophageal reflux disease)     Hematuria     HLD (hyperlipidemia) 3/2/2015    Hypertension     AIDEN (obstructive sleep apnea)     Pre-diabetes     Sleep apnea     does not use a machine    Wears dentures     upper      Past Surgical History:   Procedure Laterality Date    ANKLE SURGERY Left     fracture    ARTHROPLASTY      4th and 5th toe on left foot    COLONOSCOPY  2014    normal    CYSTOSCOPY  2017    retrograde    CYSTOSCOPY Bilateral 3/21/2017    CYSTOSCOPY, RETROGRADE PYELOGRAM [facility] [address] [facility]    left foot 5th digit derotational arthroplasty     Family History   Problem Relation Age of Onset    High Blood Pressure Mother     Diabetes Father      Social History     Tobacco Use    Smoking status: Former Smoker     Types: Cigars     Last attempt to quit: 2019     Years since quittin.1    Smokeless tobacco: Never Used    Tobacco comment: occassionally   Substance Use Topics    Alcohol use: Yes     Alcohol/week: 4.2 standard drinks     Types: 5 Standard drinks or equivalent per week     Comment: weekends/ 6 drinks      No Known Allergies    Subjective:      Review of Systems   Constitutional: Negative for chills and fever. Respiratory: Negative for cough and shortness of breath. Cardiovascular: Negative for chest pain and leg swelling.      Other pertinent ROS in HPI  Objective:     /78   Pulse 88 symptoms worsen or fail to improve  Pt agreeable with plan      Patient given educational materials - see patientinstructions. Discussed use, benefit, and side effects of prescribed medications. All patient questions answered. Pt voiced understanding. Reviewed health maintenance. Instructed to continue current medications, diet andexercise. 1.  Nonda Pick received counseling on the following healthy behaviors: nutrition, exercise and medication adherence  2. Patient given educationalmaterials when available - see patient instructions when applicable  3. Discussed use, benefit, and side effects of prescribed medications. Barriersto medication compliance addressed. All patient questions answered. Pt voiced understanding. 4.  Reviewed prior labs and health maintenance when applicable. 5.  Continue current medications, diet and exercise. CompletedRefills   Requested Prescriptions      No prescriptions requested or ordered in this encounter       This note was transcribed using dictation with Dragon services. Efforts were made to correct any errors but some words may be misinterpreted.     Electronically signed by Macarena Erickson CNP on 2/24/2020 at 1:31 PM

## 2020-05-14 ENCOUNTER — HOSPITAL ENCOUNTER (OUTPATIENT)
Dept: PAIN MANAGEMENT | Age: 45
Discharge: HOME | End: 2020-05-14
Payer: MEDICAID

## 2020-05-14 PROCEDURE — 99204 OFFICE O/P NEW MOD 45 MIN: CPT | Performed by: PAIN MEDICINE

## 2020-05-14 PROCEDURE — 99203 OFFICE O/P NEW LOW 30 MIN: CPT

## 2020-05-14 ASSESSMENT — ENCOUNTER SYMPTOMS
RECTAL PAIN: 0
CONSTIPATION: 0
BACK PAIN: 1
ABDOMINAL PAIN: 0
EYES NEGATIVE: 1
DIARRHEA: 0
NAUSEA: 0

## 2020-05-14 NOTE — CONSULTS
Marley Hodges is a 38 y.o. male evaluated on 5/14/2020. Patient is referred by TRES Dozier - CNP  Modality of virtual service provided -via  telephone   Consent:  Patient and/or health care decision maker is aware that that patient may receive a bill for this telephone service, depending on one's insurance coverage, and has provided verbal consent to proceed: Yes    Patient identification was verified at the start of the visit: Yes    Chief complaint: Marley Hodges is 38 y.o., [place] American male, with  No chief complaint on file. .    Patient is a 41-year-old male who is complaining of pain involving the low back area of longstanding duration. Patient reports he fell down steps about a year ago and since then had the back pain. He tried physical therapy without much improvement. This patient is a poor historian and is very difficult to get a good history from him appears to be somewhat forgetful. Patient reports he was getting medication from his primary care provider. Patient reports he smokes sometimes uses alcohol sometimes he denies any use of illegal drugs at present or in the past.  He was evaluated by neurosurgery    Back Pain   This is a chronic problem. The current episode started more than 1 year ago. The problem occurs constantly. The problem is unchanged. The pain is present in the lumbar spine, sacro-iliac and gluteal. The quality of the pain is described as aching (sharp). Radiates to: bilat. Buttocks. The pain is at a severity of 8/10. The pain is severe. The pain is the same all the time. The symptoms are aggravated by lying down, sitting, standing and twisting (walking, lifting, ADLs). Associated symptoms include numbness and tingling. Pertinent negatives include no abdominal pain, bladder incontinence, bowel incontinence, chest pain, dysuria, fever, weakness or weight loss. (In feet bilat) Risk factors include lack of exercise and obesity.         Alleviating factors:OTC NSAIDs   Lifestyle changes experienced with pain: Keeps awake at night, Prevents or limits ADLs, Increases w/prolonged sitting/standing/walking, Constant  Mood changes,none  Patient currently unemployed. Physical therapy did not help the pain. Are you under psychological counseling at present: No  Goals for treatment include:  Decrease in pain  Enjoy daily and recreational activities, return to previous status. Last procedure was      Patient relates current medications are helping the pain. Patient reports taking pain medications as prescribed, denies obtaining medications from different sources and denies use of illegal drugs. Patient denies side effects from medications like nausea, vomiting, constipation or drowsiness. Patient reports current activities of daily living ar possible due to medications and would like to continue them.       ADVERSE MEDICATION EFFECTS:   Nausea and vomiting: no   Constipation: no-Undercontrol-: yes  Dizziness/drowsy/sleepy--no  Urinary Retention: no    ACTIVITY/SOCIAL/EMOTIONAL:  Sleep Pattern: 7 hours per night. nightime awakenings  Home Exercises: - none  Activity:decreased  Emotional Issues: normal.   Currently seeing a Psychiatrist or Psychologist:  No      ABERRANT BEHAVIORS SINCE LAST VISIT  Lost rx/pills:------------------------------------------ not applicable  Taking  medication as prescribed: ----------- not applicable  Urine Drug Screen ---------------------------------  not applicable  Recent ER visits: -------------------------------------No  Pill count is appropriate: ---------------------------not applicable   Refills for prescriptions appropriate:---------- not applicable      Past Medical History:   Diagnosis Date    Dry skin     GERD (gastroesophageal reflux disease)     Hematuria     HLD (hyperlipidemia) 3/2/2015    Hypertension     AIDEN (obstructive sleep apnea)     Pre-diabetes     Sleep apnea     does not use a machine    Wears dentures     upper foraminal stenosis       L5-S1: Annular disc bulge in mild facet arthropathy results in mild bilateral   foraminal stenosis           Impression   Mild bilateral foraminal stenosis at L4-5 and L5-S1       Clinical  impression:  1. Lumbar radiculopathy    2. Lumbosacral spondylosis without myelopathy    3. DDD (degenerative disc disease), lumbar        Patient's   [] x-ray    [] CT scan    [x] MRI  Were/was  Reviewed. These findings are consistent with the patient's symptoms and physical examination. [x] Patient's findings on the x-ray were explained to the patient using a bone modal.    Other reports reviewed include    [] Bone scan   [] EMG and nerve conduction studies   [x] Referral reports-  I also discussed with him the following treatment options Including advantages and disadvantages of each:    [x] Physical therapy    [x] Interventional pain treatment    [] Medication management    [] Surgical options    Patient's OARRS were reviewed. It is acceptable and appears patient is not receiving prescriptions from multiple prescribers. Patient is  forthcoming regarding prescriptions for pain medication in the past  Controlled Substances Monitoring: Periodic Controlled Substance Monitoring: No signs of potential drug abuse or diversion identified. , Assessed functional status. (Evonne Henriquez MD)      Counselling/Preventive measures for pain  Control:    [x]  Spine strengthening exercises are discussed with patient in detail. Discussed with the patient regarding his treatment options-including physical therapy exercise diet and weight loss as well as core strengthening. We also discussed about lumbar epidural steroid injection. The procedure risks and alternate therapies were discussed at length with the patient. Patient at this time is not sure whether he would like to proceed with it or not. He is told to call the pain clinic if he wishes to proceed with the injection.   Otherwise he is advised to call the pain clinic if he decides to proceed with it. This note was created using voice recognition software. There may be inaccuracies of transcription  that are inadvertently overlooked prior to the signature. There is any questions about the transcription please contact me.     Electronically signed by Libby Gibson MD on 5/15/2020 at 5:33 AM

## 2020-05-15 ASSESSMENT — ENCOUNTER SYMPTOMS
CHOKING: 0
EYE PAIN: 0
COUGH: 0
BOWEL INCONTINENCE: 0
EYE REDNESS: 0
SHORTNESS OF BREATH: 0

## 2020-05-22 ENCOUNTER — OFFICE VISIT (OUTPATIENT)
Dept: FAMILY MEDICINE CLINIC | Age: 45
End: 2020-05-22
Payer: MEDICAID

## 2020-05-22 VITALS
HEART RATE: 85 BPM | WEIGHT: 253.6 LBS | OXYGEN SATURATION: 97 % | BODY MASS INDEX: 39.72 KG/M2 | TEMPERATURE: 97 F | SYSTOLIC BLOOD PRESSURE: 110 MMHG | DIASTOLIC BLOOD PRESSURE: 72 MMHG

## 2020-05-22 LAB
CREATININE URINE POCT: 300
HBA1C MFR BLD: 8.8 %
MICROALBUMIN/CREAT 24H UR: 10 MG/G{CREAT}
MICROALBUMIN/CREAT UR-RTO: <30

## 2020-05-22 PROCEDURE — 82044 UR ALBUMIN SEMIQUANTITATIVE: CPT | Performed by: NURSE PRACTITIONER

## 2020-05-22 PROCEDURE — 83036 HEMOGLOBIN GLYCOSYLATED A1C: CPT | Performed by: NURSE PRACTITIONER

## 2020-05-22 PROCEDURE — 2022F DILAT RTA XM EVC RTNOPTHY: CPT | Performed by: NURSE PRACTITIONER

## 2020-05-22 PROCEDURE — 99214 OFFICE O/P EST MOD 30 MIN: CPT | Performed by: NURSE PRACTITIONER

## 2020-05-22 PROCEDURE — G8427 DOCREV CUR MEDS BY ELIG CLIN: HCPCS | Performed by: NURSE PRACTITIONER

## 2020-05-22 PROCEDURE — 3052F HG A1C>EQUAL 8.0%<EQUAL 9.0%: CPT | Performed by: NURSE PRACTITIONER

## 2020-05-22 PROCEDURE — 1036F TOBACCO NON-USER: CPT | Performed by: NURSE PRACTITIONER

## 2020-05-22 PROCEDURE — G8417 CALC BMI ABV UP PARAM F/U: HCPCS | Performed by: NURSE PRACTITIONER

## 2020-05-22 RX ORDER — LISINOPRIL AND HYDROCHLOROTHIAZIDE 12.5; 1 MG/1; MG/1
TABLET ORAL
Qty: 30 TABLET | Refills: 5 | Status: SHIPPED | OUTPATIENT
Start: 2020-05-22 | End: 2020-12-09

## 2020-05-22 ASSESSMENT — ENCOUNTER SYMPTOMS
SHORTNESS OF BREATH: 0
COUGH: 0

## 2020-05-22 NOTE — PATIENT INSTRUCTIONS
build-up of lactic acid in your blood. This may be more likely if you have other medical conditions, a severe infection, chronic alcoholism, or if you are 72 or older. Ask your doctor about your risk. Follow your doctor's instructions about using this medicine if you are pregnant. Blood sugar control is very important during pregnancy, and your dose needs may be different during each trimester of pregnancy. Tell your doctor if you become pregnant while taking metformin. Metformin may stimulate ovulation in a premenopausal woman and may increase the risk of unintended pregnancy. Talk to your doctor about your risk. You should not breastfeed while using this medicine. Metformin should not be given to a child younger than 8years old. Some forms of metformin are not approved for use by anyone younger than 25years old. How should I take metformin? Follow all directions on your prescription label and read all medication guides or instruction sheets. Your doctor may occasionally change your dose. Use the medicine exactly as directed. Take metformin with a meal, unless your doctor tells you otherwise. Some forms of metformin are taken only once daily with the evening meal. Follow your doctor's instructions. Do not crush, chew, or break an extended-release tablet. Swallow it whole. Measure liquid medicine carefully. Use the dosing syringe provided, or use a medicine dose-measuring device (not a kitchen spoon). Some tablets are made with a shell that is not absorbed or melted in the body. Part of this shell may appear in your stool. This is normal and will not make the medicine less effective. You may have low blood sugar (hypoglycemia) and feel very hungry, dizzy, irritable, confused, anxious, or shaky. To quickly treat hypoglycemia, eat or drink a fast-acting source of sugar (fruit juice, hard candy, crackers, raisins, or non-diet soda).   Your doctor may prescribe a glucagon injection kit in case you have severe hypoglycemia. Be sure your family or close friends know how to give you this injection in an emergency. Blood sugar levels can be affected by stress, illness, surgery, exercise, alcohol use, or skipping meals. Ask your doctor before changing your dose or medication schedule. Metformin is only part of a complete treatment program that may also include diet, exercise, weight control, blood sugar testing, and special medical care. Follow your doctor's instructions very closely. Store at room temperature away from moisture, heat, and light. Your doctor may have you take extra vitamin B12 while you are taking metformin. Take only the amount of vitamin B12 that your doctor has prescribed. What happens if I miss a dose? Take the medicine as soon as you can, but skip the missed dose if it is almost time for your next dose. Do not take two doses at one time. What happens if I overdose? Seek emergency medical attention or call the Poison Help line at 1-286.451.4536. An overdose can cause severe hypoglycemia or lactic acidosis. What should I avoid while taking metformin? Avoid drinking alcohol. It lowers blood sugar and may increase your risk of lactic acidosis. What are the possible side effects of metformin? Get emergency medical help if you have signs of an allergic reaction:  hives; difficult breathing; swelling of your face, lips, tongue, or throat. Some people using metformin develop lactic acidosis, which can be fatal. Get emergency medical help if you have even mild symptoms such as:  · unusual muscle pain;  · feeling cold;  · trouble breathing;  · feeling dizzy, light-headed, tired, or very weak;  · stomach pain, vomiting; or  · slow or irregular heart rate. Common side effects may include:  · low blood sugar;  · nausea, upset stomach; or  · diarrhea. This is not a complete list of side effects and others may occur. Call your doctor for medical advice about side effects.  You may report side

## 2020-05-29 ENCOUNTER — OFFICE VISIT (OUTPATIENT)
Dept: PODIATRY | Age: 45
End: 2020-05-29
Payer: MEDICAID

## 2020-05-29 VITALS — RESPIRATION RATE: 18 BRPM | HEIGHT: 66 IN | TEMPERATURE: 97.7 F | BODY MASS INDEX: 40.66 KG/M2 | WEIGHT: 253 LBS

## 2020-05-29 PROCEDURE — 11721 DEBRIDE NAIL 6 OR MORE: CPT | Performed by: PODIATRIST

## 2020-05-29 PROCEDURE — G8427 DOCREV CUR MEDS BY ELIG CLIN: HCPCS | Performed by: PODIATRIST

## 2020-05-29 PROCEDURE — G8417 CALC BMI ABV UP PARAM F/U: HCPCS | Performed by: PODIATRIST

## 2020-05-29 PROCEDURE — 1036F TOBACCO NON-USER: CPT | Performed by: PODIATRIST

## 2020-05-29 PROCEDURE — 99213 OFFICE O/P EST LOW 20 MIN: CPT | Performed by: PODIATRIST

## 2020-05-29 PROCEDURE — 17110 DESTRUCTION B9 LES UP TO 14: CPT | Performed by: PODIATRIST

## 2020-05-29 NOTE — PROGRESS NOTES
[facility] PHYSICIANS  [facility]  [address] [place] [address]  Dept: [phone]  Dept Fax: [phone]     FOOT PAIN & BENIGN NEOPLASM PROGRESS NOTE  Date of patient's visit: 5/29/2020  Patient's Name:  Mahin Figueroa YOB: 1975            Patient Care Team:  TRES Martin CNP as PCP - General (Certified Nurse Practitioner)  TRES Martin CNP as PCP - [facility]  Mandy Lira MD as Consulting Physician (Ophthalmology)  Tierney Kwok DPM as Physician (Podiatry)          Chief Complaint   Patient presents with    Foot Pain       Subjective: This Mahin Figueroa comes to clinic for foot and nail care. Pt currently has complaint of thickened, painful, elongated nails that he/she cannot manage by themselves. Pt. Relates pain to nails with shoe gear. Pt's primary care physician is Som Carreon seen  05/22/2020. He also c/o of new painful skin lesion to the left 5th digit that has worsened with shoes. Past Medical History:   Diagnosis Date    Dry skin     GERD (gastroesophageal reflux disease)     Hematuria     HLD (hyperlipidemia) 3/2/2015    Hypertension     AIDEN (obstructive sleep apnea)     Pre-diabetes     Sleep apnea     does not use a machine    Wears dentures     upper       No Known Allergies  Current Outpatient Medications on File Prior to Visit   Medication Sig Dispense Refill    lisinopril-hydroCHLOROthiazide (PRINZIDE;ZESTORETIC) 10-12.5 MG per tablet take 1 tablet by mouth once daily 30 tablet 5    UREA 20 INTENSIVE HYDRATING 20 % cream apply to FEET twice a day 85 g 3    Urea (CARMOL) 40 % cream Apply to feet bid 1 Tube 3    ammonium lactate (LAC-HYDRIN) 12 % cream Apply topically as needed. 1 Bottle 4     No current facility-administered medications on file prior to visit.       Review of Systems    Review of Systems:   History obtained from chart review and the patient  General ROS: negative for - chills, fatigue, fever, night sweats or weight gain  Constitutional: Negative for chills, diaphoresis, fatigue, fever and unexpected weight change. Musculoskeletal: Positive for arthralgias, gait problem and joint swelling. Neurological ROS: negative for - behavioral changes, confusion, headaches or seizures. Negative for weakness and numbness. Dermatological ROS: negative for - mole changes, rash  Cardiovascular: Negative for leg swelling. Gastrointestinal: Negative for constipation, diarrhea, nausea and vomiting. Objective:  Dermatologic Exam:  Soft tissue lesion to the plantar right and left 5th digit with central core and petechiae. Pain on palpation of lesion. Skin No rashes or nodules noted. .   Skin is thin, with flaky sloughing skin as well as decreased hair growth to the lower leg  Small red hemosiderin deposits seen dorsal foot   Musculoskeletal:     1st MPJ ROM decreased, Bilateral.  Muscle strength 5/5, Bilateral.  Pain present upon palpation of toenails 1-5, Bilateral. decreased medial longitudinal arch, Bilateral.  Ankle ROM decreased,Bilateral.    Dorsally contracted digits present digits 2, Bilateral.     Vascular: DP pulses 1/4 bilateral.  PT pulses 0/4 bilateral.   CFT <5 seconds, Bilateral.  Hair growth absent to the level of the digits, Bilateral.  Edema present, Bilateral.  Varicosities absent, Bilateral. Erythema absent, Bilateral    Neurological: Sensation diminshed to light touch to level of digits, Bilateral.  Protective sensation intact 6/10 sites via 5.07/10g [place] Monofilament, Bilateral.  negative Tinel's, Bilateral.  negative Valleix sign, Bilateral.      Integument: Warm, dry, supple, Bilateral.  Open lesion absent, Bilateral.  Interdigital maceration absent to web spaces 4, Bilateral.  Nails 1-5 left and 1-5 right thickened > 3.0 mm, dystrophic and crumbly, discolored with subungual debris.   Fissures absent, Bilateral.   General: AAO x 3 in NAD. Derm  Toenail Description  Sites of Onychomycosis Involvement (Check affected area)  [x] [x] [x] [x] [x] [x] [x] [x] [x] [x]  5 4 3 2 1 1 2 3 4 5                          Right                                        Left    Thickness  [x] [x] [x] [x] [x] [x] [x] [x] [x] [x]  5 4 3 2 1 1 2 3 4 5                         Right                                        Left    Dystrophic Changes   [x] [x] [x] [x] [x] [x] [x] [x] [x] [x]  5 4 3 2 1 1 2 3 4 5                         Right                                        Left    Color  [x] [x] [x] [x] [x] [x] [x] [x] [x] [x]  5 4 3 2 1 1 2 3 4 5                          Right                                        Left    Incurvation/Ingrowin   [] [] [] [] [] [] [] [] [] []  5 4 3 2 1 1 2 3 4 5                         Right                                        Left    Inflammation/Pain   [x] [x] [x] [x] [x] [x] [x] [x] [x] [x]  5 4 3  2 1 1 2 3 4 5                         Right                                        Left       Nails are painful 1-10 with direct palpation. Q7   []Yes  []No                Q8   [x]Yes  []No                     Q9   []Yes    []No    Assessment:  38 y.o. male with:      Diagnosis Orders   1. Benign neoplasm of skin of left foot  NV DESTRUCTION BENIGN LESIONS UP TO 14   2. Difficulty walking  NV DESTRUCTION BENIGN LESIONS UP TO 14    68483 - NV DEBRIDEMENT OF NAILS, 6 OR MORE   3. Dermatophytosis of nail  NV DESTRUCTION BENIGN LESIONS UP TO 14    52259 - NV DEBRIDEMENT OF NAILS, 6 OR MORE   4. Xerosis cutis     5. Ingrown nail  64169 - NV DEBRIDEMENT OF NAILS, 6 OR MORE       Plan:   Pt was evaluated and examined. Patient was given personalized discharge instructions. Nails 1-10 were debrided in length and thickness sharply with a nail nipper and  without incident. Pt will follow up in 9 weeks or sooner if any problems arise.  Diagnosis was discussed with the pt and all of their

## 2020-06-03 ENCOUNTER — HOSPITAL ENCOUNTER (OUTPATIENT)
Age: 45
Discharge: HOME | End: 2020-06-03
Payer: MEDICAID

## 2020-06-03 LAB
ALBUMIN SERPL-MCNC: 4.2 G/DL (ref 3.5–5.2)
ALBUMIN/GLOBULIN RATIO: 1.4 (ref 1–2.5)
ALP BLD-CCNC: 68 U/L (ref 40–129)
ALT SERPL-CCNC: 34 U/L (ref 5–41)
ANION GAP SERPL CALCULATED.3IONS-SCNC: 15 MMOL/L (ref 9–17)
AST SERPL-CCNC: 31 U/L
BILIRUB SERPL-MCNC: 0.47 MG/DL (ref 0.3–1.2)
BUN BLDV-MCNC: 11 MG/DL (ref 6–20)
BUN/CREAT BLD: ABNORMAL (ref 9–20)
CALCIUM SERPL-MCNC: 9.3 MG/DL (ref 8.6–10.4)
CHLORIDE BLD-SCNC: 97 MMOL/L (ref 98–107)
CHOLESTEROL, FASTING: 174 MG/DL
CHOLESTEROL/HDL RATIO: 4.6
CO2: 25 MMOL/L (ref 20–31)
CREAT SERPL-MCNC: 0.81 MG/DL (ref 0.7–1.2)
GFR AFRICAN AMERICAN: >60 ML/MIN
GFR NON-AFRICAN AMERICAN: >60 ML/MIN
GFR SERPL CREATININE-BSD FRML MDRD: ABNORMAL ML/MIN/{1.73_M2}
GFR SERPL CREATININE-BSD FRML MDRD: ABNORMAL ML/MIN/{1.73_M2}
GLUCOSE BLD-MCNC: 158 MG/DL (ref 70–99)
HCT VFR BLD CALC: 41.4 % (ref 40.7–50.3)
HDLC SERPL-MCNC: 38 MG/DL
HEMOGLOBIN: 13.3 G/DL (ref 13–17)
LDL CHOLESTEROL: 102 MG/DL (ref 0–130)
MCH RBC QN AUTO: 26.7 PG (ref 25.2–33.5)
MCHC RBC AUTO-ENTMCNC: 32.1 G/DL (ref 28.4–34.8)
MCV RBC AUTO: 83.1 FL (ref 82.6–102.9)
NRBC AUTOMATED: 0 PER 100 WBC
PDW BLD-RTO: 15.7 % (ref 11.8–14.4)
PLATELET # BLD: 245 K/UL (ref 138–453)
PMV BLD AUTO: 9.2 FL (ref 8.1–13.5)
POTASSIUM SERPL-SCNC: 3.8 MMOL/L (ref 3.7–5.3)
PROSTATE SPECIFIC ANTIGEN: 0.41 UG/L
RBC # BLD: 4.98 M/UL (ref 4.21–5.77)
SODIUM BLD-SCNC: 137 MMOL/L (ref 135–144)
TOTAL PROTEIN: 7.3 G/DL (ref 6.4–8.3)
TRIGLYCERIDE, FASTING: 172 MG/DL
VLDLC SERPL CALC-MCNC: ABNORMAL MG/DL (ref 1–30)
WBC # BLD: 5.3 K/UL (ref 3.5–11.3)

## 2020-06-03 PROCEDURE — 36415 COLL VENOUS BLD VENIPUNCTURE: CPT

## 2020-06-03 PROCEDURE — 85027 COMPLETE CBC AUTOMATED: CPT

## 2020-06-03 PROCEDURE — 80053 COMPREHEN METABOLIC PANEL: CPT

## 2020-06-03 PROCEDURE — G0103 PSA SCREENING: HCPCS

## 2020-06-03 PROCEDURE — 80061 LIPID PANEL: CPT

## 2020-06-03 RX ORDER — ATORVASTATIN CALCIUM 20 MG/1
20 TABLET, FILM COATED ORAL DAILY
Qty: 30 TABLET | Refills: 3 | Status: SHIPPED | OUTPATIENT
Start: 2020-06-03 | End: 2020-10-05

## 2020-06-03 NOTE — FLOWSHEET NOTE
Yes, she needs to do 2 more cycles consecutively and control DM needs urine preg and urine micro for DM. This is throwing off hormones. [] [facility]) - [facility] &  Therapy  [address]  [phone]  F: [phone] [] [address]  [facility]   [address]  P: [phone]  F: [phone] [] [address] &  Therapy  [address] [facility]  P: [phone]  F: [phone] [] [address]  [facility]   [address]  [place]: [phone]  F: [phone]     Physical Therapy Daily Treatment Note    Date:  2019  Patient Name:  Bishop Ingram    :  1975  MRN: [id number]  Physician: SUSANNAH Parks                               Insurance: [employer] Diagnosis: M25.512 - Acute pain of left shoulder              Rehab Codes: M25.512, M62.81  Onset Date: May 2019                      Next 's appt: 19      Visit# / total visits: 3/12 Cancels/No Shows:     Subjective:    Pain:  [x] Yes  [] No Location: Left shoulder  Pain Rating: (0-10 scale) 7/10  Pain altered Tx:  [x] No  [] Yes  Action:    Comments: pt states he is still having pain in the shoulder mostly superior shoulder. Objective:  Modalities: CP end treatment 10 min  Precautions:  Exercises:  Exercise Reps/ Time Weight/ Level Comments   UBE 3\"/3\" Lv 1.5 Not today due to availability. Supine      Cane shoulder AAROM (flex, abduction, ER) 15x5\" ea   Given as part of HEP   Sleeper stretch 10x10\"           Seated      Scapular retraction 15x5\"   Given as part of HEP   IR stretch behind back with towel to midline. 15x                 Prone      Rows 6x6\"  Tactile cueing to ensure scap retraction.     T's 6x6\"      Y's 6x6\"  Very difficult   I's 6x6\"  Too difficult to complete   Ext 6x6\"           Standing      Tband rows 15x Red    Tband ext 15x Red    Tband ER 15x Red bilateral             Ball on wall ROM 10xea  Flexion, scaption, abduction    Ball on wall stabilization  20xea  CW, CCW in scaption plane                Postural education     Pt educated on decreasing rounded shoulder posture to decrease further impingement in front of left shoulder             Other:     Specific Instructions for next treatment: postural awareness, progress shoulder strengthening and ROM exercises, scapular stabilization             Treatment Charges: Mins Units   [x]  Modalities CP 10  -   [x]  Ther Exercise 35 2   []  Manual Therapy     []  Ther Activities     []  Aquatics     []  Vasocompression     []  Other     Total Treatment time 35 2       Assessment: [] Progressing toward goals. Pt did well with exercises, pain with most exercises, more with overhead motions. [] No change. [] Other:    STG: (to be met in 6 treatments)  1. ? Pain: Pt will report decreased left shoulder pain to 5/10 following reaching overhead and reaching behind his back. 2. ? ROM: Pt will increase left shoulder AROM by 20 degrees in flexion and abduction in order to reach overhead. 3. ? Strength: Pt will increase left shoulder strength to 5/5 globally in order to lift and carry objects. 4. ? Function:   5. Independent with Home Exercise Programs  LTG: (to be met in 12 treatments)  6. ? Pain: Pt will report decreased left shoulder pain to 2/10 following reaching overhead and reaching behind his back. 7. ? ROM: Pt will increase left shoulder AROM by 30 degrees in flexion and abduction in order to reach overhead. 8. ? Function: Pt will demonstrate improved functional activity tolerance as evident by an improved score on the UEFI to less than 15% impairment. 9. Pt will no longer report instances of his left upper extremity going numb.                    Patient goals:        Pt. Education:  [x] Yes  [] No  [x] Reviewed Prior HEP/Ed  Method of Education: [x] Verbal  [] Demo  [x] Written  6/19 - Sleeper stretch  Comprehension of Education:  [x] Verbalizes understanding.   [x] Demonstrates understanding. [x] Needs review. [] Demonstrates/verbalizes HEP/Ed previously given. Plan: [x] Continue per plan of care.    [] Other:      Time In:105 pm            Time Out: 145    Electronically signed by:  Colie Schlatter, PTA

## 2020-06-08 ENCOUNTER — HOSPITAL ENCOUNTER (OUTPATIENT)
Dept: DIABETES SERVICES | Age: 45
Setting detail: THERAPIES SERIES
Discharge: HOME | End: 2020-06-08
Payer: MEDICAID

## 2020-06-08 PROCEDURE — G0108 DIAB MANAGE TRN  PER INDIV: HCPCS

## 2020-06-08 NOTE — PROGRESS NOTES
and allen    -last night more food / sweets    Sweets and pop and fried food in past were     What to eat - Food groups, When to eat - timing of meals and snacks, and How much to eat - portions control. calories/ day   CHO choices/ meal   CHO choices/  day   grams of protein /day   gram of fat /day     Correctly read food labels & demonstrate CHO counting & portion control with personalized meal plan. Identify dining out strategies, & dietary sick day guidelines. 1       Incorporating physical activity into lifestyle:   Verbalize effect of exercise on blood glucose levels; benefits of regular exercise; safety considerations; contraindications; maintenance of activity. 1    Some numbness in feet   - activity - walk dog and ride bike - start to jog   Using medications safely:  Identify effects of diabetes medicines on blood glucose levels; List diabetes medication taken, action & side effects;    1    - none for DM   Insulin / Injectable - Appropriate injection sites; proper storage; supplies needed; proper technique; safe needle disposal guidelines. 1       Monitoring blood glucose, interpreting and using results:  Identify recommended & personal blood glucose targets; importance of testing; testing supplies; HgbA1C target levels; Factors affecting blood glucose; Importance of logging blood glucose levels for pattern recognition; ketone testing; safe lancet disposal.   1    Only follows A1C - per chart review - A1C of 6.6 3/2017 and more recent   Lab Results   Component Value Date    LABA1C 8.8 05/22/2020    LABA1C 8.1 02/24/2020    LABA1C 5.9 02/22/2019        Prevention, detection & treatment of acute complications:  Identify symptoms of hyper & hypoglycemia, and prevention & treatment strategies. 1     symptoms - none-    IF for higher BG - bit more more blurry vision   Describe sick day guidelines & indications for  physician notification. Identify short term consequences of poor control. Disaster preparedness strategies    1       Prevention, detection & treatment of chronic complications:  Define the natural course of diabetes & describe the relationship of blood glucose levels to long term complications of diabetes. Identify preventative measures & standards of care. 1    Foot MD - visit 3 months  PCP - checks lab - started on cholesterol med 5/2020    EYE - care not recently - plan to do soon  Dental - plan to see on Wed    Developing strategies to address psychosocial issues:  Describe feelings about living with diabetes; Describe how stress, depression & anxiety affect blood glucose; Identify coping strategies; Identify support needed & support network available. 1    Has good support system- SO helps and also in board with we loss plans   Developing strategies to promote health/change behavior: Identify 7 self-care behaviors; Personal health risk factors; Benefits, challenges & strategies for behavioral change;    1         Individualized goal selection. Goal is weight loss - desire is 30 lbs  Baseline wt is 253lbs ( 6/8/20)               My goal , to help me improve my health, I will:   1. Add in Bk daily - like - eggs,  graf and toast    2. Stay off /  Stop eating  skittles and sweets and regular pop       Plan  Follow-up Appointments planned - in 2 weeks - support info ( class 1 and 2 Sent out 6/8/20) patient stated poor connectivity in home/ limited wifi and prefer phone call for follow up     Instruction Method: [x]Lecture/Discussion  []Power Point Presentation  [x]Handouts  []Return Demonstration    Education Materials/Equipment Provided (VIA Mail for phone visits)  :    [x]Self-Management - Initial assessment - Enrolment in to ADA  Where do I Begin, Living with Type 2 diabetes ADA home support program and  handout on diabetes education classes.      []Self-Management  Class 1 -Self-Management  Class 1 - \"Diabetes - your take control guide\" - ADA booklet -  o  \"ready, set, start [facility], [place], [facility] (site rotate)  Call [phone] or visit   website: [email or web address]-[email or web address]-[email or web address] for more details   Check web site for updated times/ dates       [] [address]  [address] [facility], [address] [facility] Tuesday and Thursday -   9 :30 am- 10:30am - ongoing   [] [address]  [address] [facility], [address] [facility] [address] [address] [facility] Thursday 11:00am - 11:45am     [] Third Wednesday Cooking  Class  Free  Registration is required     [address] [facility]. [address] [facility], 125 [facility] [phone] or   Email [email or web address]   Wednesdays-5:00- 6:00 pm       [] Eat Smart  Be Active & Learn How - Free   Families & grandparents with children in home 0- 25 & pregnant moms          Various sites in community - call to find next session near you. [facility] extension office for future sessions - Vernon Brady  Email : [email or web address] [facility]  Phone: [phone]     [] (SNAP-Ed)   - free nutrition education   - adults and youth, who are eligible for the Supplemental Nutrition Assistance Program    Various sites in community - call to find next session near you. [facility] [place] SNAP-Ed  contact Rosalee Granados, Email:[phone]           Post Education Referrals:      [] [place] Tobacco Quit information sheet and [address] , 21       [] Dental care - Dental care of [facility]     [] [facility] ([facility]) link  phone number - for information and referral to [facility]  Clinically  [address] Manuel, WEIGHT MANAGEMENT        []Other  Reuel Bence, RN CDE

## 2020-06-22 ENCOUNTER — HOSPITAL ENCOUNTER (OUTPATIENT)
Dept: DIABETES SERVICES | Age: 45
Setting detail: THERAPIES SERIES
Discharge: HOME | End: 2020-06-22
Payer: MEDICAID

## 2020-06-22 PROCEDURE — G0108 DIAB MANAGE TRN  PER INDIV: HCPCS

## 2020-06-22 NOTE — PROGRESS NOTES
consequences of poor control. Disaster preparedness strategies    1       Prevention, detection & treatment of chronic complications:  Define the natural course of diabetes & describe the relationship of blood glucose levels to long term complications of diabetes. Identify preventative measures & standards of care. 1    Foot MD - visit 3 months  PCP - checks lab - started on cholesterol med 5/2020    EYE - care not recently - plan to do soon  Dental - plan to see on Wed    Developing strategies to address psychosocial issues:  Describe feelings about living with diabetes; Describe how stress, depression & anxiety affect blood glucose; Identify coping strategies; Identify support needed & support network available. 1 6/22/20rs   Has good support system- SO helps and also in board with we loss plans   Developing strategies to promote health/change behavior: Identify 7 self-care behaviors; Personal health risk factors; Benefits, challenges & strategies for behavioral change;    1         Individualized goal selection. Goal is weight loss - desire is 30 lbs  Baseline wt is 253lbs ( 6/8/20)               My goal , to help me improve my health, I will:   1. Add in Bk daily - like - eggs,  graf and toast    2. Stay off /  Stop eating  skittles and sweets and regular pop       Plan  Follow-up Appointments planned - in 1 weeks - support info ( class 1 and 2 Sent out 6/8/20) patient stated poor connectivity in home/ limited wifi and prefer phone call for follow up   -- Call with GF and patient for diet classes    Instruction Method: [x]Lecture/Discussion  []Power Point Presentation  [x]Handouts  []Return Demonstration    Education Materials/Equipment Provided (VIA Mail for phone visits)  :    [x]Self-Management - Initial assessment - Enrolment in to ADA  Where do I Begin, Living with Type 2 diabetes ADA home support program and  handout on diabetes education classes.      [x]Self-Management  Class 1 -Self-Management Class 1 - \"Diabetes - your take control guide\" - ADA booklet -  o  \"ready, set, start counting\" teaching sheet in diet section   o  GLP-1 understanding 8 core deficits puzzle sheet in the medication section  o one day food diary and envelope for return of diet HX   o  You tube and website resource sheet-Understanding Type 2 Diabetes from Animated Diabetes Patient [email or web address] -- 6/22/20rs      [] Self-Management  Class 2 - Meal Plan and handout for serving sizes, smarter snacking, Ready Set Carb Counting / Plate Method, Nutrient Conversion and International Diabetes [address] Eating for People with Diabetes and Nutrition in the [employer] - fast facts about fast food    [] Self-Management  Class 3 -  Diabetes your take control guide pages 20 - 30,  type 2 diabetes and the role of GLP- 1,  Individualized Diabetes report card     [] Self-Management Class 4 - BD Booklet  Sick Day Rules and  Dinning Out Guide , recipe hand outs and tips, diabetes Cookbooks  ( when available)     []Self-Management - 3 month follow -  AADE7 Self care behaviors work sheets, 2020 [employer],  Online resource list - March 2020      []Self-Management  Gestational - RN class -Resource materials sent out : care booklet - \" Gestational Diabetes Mellitus ( GDM) toolkit form [place] gestational diabetes postpartum care learning collaborative 2018. \"Simple Guidelines for meal planning with gestational diabetes. SMBG sheets to fax back to [facility] weekly. BD  healthy injection site selection and rotation with 6 mm insulin syringe and 4 mm pen needle. Gestational diabetes handout from [facility] 2016. Did you have gestational diabetes when you were pregnant?  Handout from [facility]  April 2014    []Self-Management Gestational - RD class - My Food Plan for Gestational diabetes    []Glucose Meter     []Insulin Kit     []Other      Encounter Type Date Start Time End Time Comments No Show Dates Assessment 6/8/20    1 30   2 30    []In Person  [x]Telephone    Class 1 - Understanding diabetes 6/22/20rs  1 30   2 30   [x]TelephoneAmerican Diabetes Association  www. diabetes. org    Class 2- Nutrition and diabetes      []Telephone  Healthy Eating with Diabetes- Automatic Data of Diabetes and Digestive and Kidney   [email or web address]://[email or web address]. [email or web address]    Class 3 - Preventing Complications     []Telephone    Class 4 -  In depth Nutrition and sick day care    []Telephone  Diabetes Food hub  www. diabetesfoodhub.org     Class 5 - 3 month follow up / goal reassessment        Gestational - RN         Gestational - RD        Individual MNT         Shared Med Appt         Yearly Follow-up        Meter Instrx      How to Measure Your Blood Sugar - [facility] Patient Education  [email or web address] [email or web address]    Insulin Instrx      []Pen  []Vial & Syringe   BD Diabetes Care: How to Inject Insulin with a Pen Needle  [email or web address]://[email or web address] [email or web address]    Diabetes Care: How to Inject Insulin with a Syringe  [email or web address]://youtu. [email or web address]       DSMS Support :   [] MNT      [] Annual update     [] Starting Fresh  adults living with diabetes or pre diabetes. [address] [place], [place]    [id number]-3:30pm- on 6 week rotation  Free -  REGISTRATION IS REQUIRED - [facility] [phone] call for dates    []  Diabetes Group at  [address] - Free 6 week diabetes education support   classes - contact : Karen Zheng [phone]  for dates and locations      []ADA  Where do I Begin, Living with Type 2 diabetes ADA home support program    Web site: diabetes. org/living    Call: 1800 DIABETES  e-mail: Christopher@[email or web address]. [email or web address]     []  Internet web sites - ADAWeb site: www.diabetes. org       [] [facility] opens at 8 30 am daily for walkers, shops open at 10 am   [address] [facility], [facility]   [phone] Daily - 8:30am - 10am    3rd Tuesday of every month [address] [facility] expert speakers visit from 9 - 10am        [] [address] [facility], [facility], [facility], [place], [facility] blake (site rotate)  Call [phone] or visit   website: [email or web address] for more details   Check web site for updated times/ dates       [] [address]  1001 [facility], [address] [address] [place] [address] [place] Tuesday and Thursday -   9 :30 am- 10:30am - ongoing   [] [address]  655 [facility], [address] [facility] [address] [address] [facility] Thursday 11:00am - 11:45am     [] Third Wednesday Cooking  Class  Free  Registration is required     930 [facility]. 1100 [facility], 125 [facility] [phone] or   Email [email or web address]   Wednesdays-5:00- 6:00 pm       [] Eat Smart  Be Active & Learn How - Free   Families & grandparents with children in home 0- 25 & pregnant moms          Various sites in community - call to find next session near you. [facility] extension office for future sessions - Kesha Humphrey  Email : [email or web address]  Phone: [phone]     [] (SNAP-Ed)   - free nutrition education   - adults and youth, who are eligible for the Supplemental Nutrition Assistance Program    Various sites in community - call to find next session near you. [facility] [place]AN[place]-ER SNAP-Ed  contact Abdiaziz Montalvo, Email:[phone]           Post Education Referrals:      [] [place] Quit information sheet and [address] , [address]       [] Dental care - [facility] care of [facility]     [] [facility] ([facility]) link  phone number - for information and referral to [facility] - [facility]IMA [facility]  Clinically  [address] [facility], WEIGHT MANAGEMENT        []Other  Serina Ennis, RN CDE

## 2020-06-30 ENCOUNTER — HOSPITAL ENCOUNTER (OUTPATIENT)
Dept: DIABETES SERVICES | Age: 45
Setting detail: THERAPIES SERIES
End: 2020-06-30
Payer: MEDICAID

## 2020-07-13 ENCOUNTER — HOSPITAL ENCOUNTER (OUTPATIENT)
Dept: DIABETES SERVICES | Age: 45
Setting detail: THERAPIES SERIES
Discharge: HOME | End: 2020-07-13
Payer: MEDICAID

## 2020-07-13 ENCOUNTER — TELEPHONE (OUTPATIENT)
Dept: DIABETES SERVICES | Age: 45
End: 2020-07-13

## 2020-07-13 PROCEDURE — G0108 DIAB MANAGE TRN  PER INDIV: HCPCS

## 2020-07-13 RX ORDER — LANCETS 30 GAUGE
1 EACH MISCELLANEOUS DAILY
Qty: 300 EACH | Refills: 1 | Status: SHIPPED | OUTPATIENT
Start: 2020-07-13 | End: 2021-04-26 | Stop reason: SDUPTHER

## 2020-07-13 RX ORDER — GLUCOSAMINE HCL/CHONDROITIN SU 500-400 MG
CAPSULE ORAL
Qty: 300 STRIP | Refills: 1 | Status: SHIPPED | OUTPATIENT
Start: 2020-07-13 | End: 2021-04-26 | Stop reason: SDUPTHER

## 2020-07-13 NOTE — PROGRESS NOTES
monitor strips Test 1 time a day & as needed for symptoms of irregular blood glucose. Dispense sufficient amount for indicated testing frequency plus additional to accommodate PRN testing needs. 300 strip 1    atorvastatin (LIPITOR) 20 MG tablet Take 1 tablet by mouth daily 30 tablet 3    lisinopril-hydroCHLOROthiazide (PRINZIDE;ZESTORETIC) 10-12.5 MG per tablet take 1 tablet by mouth once daily 30 tablet 5    UREA 20 INTENSIVE HYDRATING 20 % cream apply to FEET twice a day 85 g 3    Urea (CARMOL) 40 % cream Apply to feet bid 1 Tube 3    ammonium lactate (LAC-HYDRIN) 12 % cream Apply topically as needed. 1 Bottle 4     No current facility-administered medications for this encounter.    :     Comments:  Allergies:  No Known Allergies    A1C blood level   Lab Results   Component Value Date    LABA1C 8.8 05/22/2020    LABA1C 8.1 02/24/2020    LABA1C 5.9 02/22/2019     Lab Results   Component Value Date    CREATININE 0.81 06/03/2020       Blood pressure   BP Readings from Last 3 Encounters:   05/22/20 110/72   02/24/20 124/78   01/28/20 131/83        Cholesterol  Lab Results   Component Value Date    LDLCHOLESTEROL 102 06/03/2020        Diabetes Self- Management Education Record    Participant Name: Manual Parma  Referring Provider: TRES Acosta CNP   Assessment/Evaluation Ratings:  1=Needs Instruction   4=Demonstrates Understanding/Competency  2=Needs Review   NC=Not Covered    3=Comprehends Key Points  N/A=Not Applicable  Topics/Learning Objectives Pre-session Assess Date:  6/8/20rs Instr. Date Reinforce Date Post- session Eval Comments   Diabetes disease process & Treatment process: Define diabetes & pre-diabetes; Identify own type of diabetes; role of the pancreas; signs/symptoms; diagnostic criteria; prevention & treatment options; contributing factors.  1 6/22/20rs   Wt up 30 lbs in last 3 months - change with eating and activity with covid - 19    Incorporating nutritional management into lifestyle: Describe effect of type, amount & timing of food on blood glucose; Describe basic meal planning techniques & current nutrition guideline   Bk - skips  Water in am   On b/p and cholesterol meds  No BG checks    Loren - sandwiches - gatraid Zero   ( switched from regular pop)  + salad     -skittles prior    Dn-   spaghetti - lasagna   Pizza   + salad-  Banana peppers - ham   Dressing   - ranch and allen    -last night more food / sweets    Sweets and pop and fried food in past were  7/13/20JW- GF came to 2nd 1/2 of session. showed video tape of healthy eating. Asked about ETOH, different sf beverages. Likes hot/spicy foods. GF loves to cook and try new recipes. What to eat - Food groups, When to eat - timing of meals and snacks, and How much to eat - portions control. Used dinner plate method. If desires more portion info suggest  [id number]   calories/ day   CHO choices/ meal   CHO choices/  day   grams of protein /day   gram of fat /day     Correctly read food labels & demonstrate CHO counting & portion control with personalized meal plan. Identify dining out strategies, & dietary sick day guidelines. 1  GF - doing shopping / cooking 7/13/20 JW general, need to give more detail at next visit. Incorporating physical activity into lifestyle:   Verbalize effect of exercise on blood glucose levels; benefits of regular exercise; safety considerations; contraindications; maintenance of activity. 1 6/22/20rs   Some numbness in feet   - activity - walk dog and ride bike - start to jog   Using medications safely:  Identify effects of diabetes medicines on blood glucose levels; List diabetes medication taken, action & side effects;    1 7/13/20 JW   - none for DM. Pt wants to avoid meds, knows he needs to make lifestyle changes. Insulin / Injectable - Appropriate injection sites; proper storage; supplies needed; proper technique; safe needle disposal guidelines.    1       Monitoring blood glucose, interpreting and using results:  Identify recommended & personal blood glucose targets; importance of testing; testing supplies; HgbA1C target levels; Factors affecting blood glucose; Importance of logging blood glucose levels for pattern recognition; ketone testing; safe lancet disposal.   1 6/22/20rs   Only follows A1C - per chart review - A1C of 6.6 3/2017 and more recent   Lab Results   Component Value Date    LABA1C 8.8 05/22/2020    LABA1C 8.1 02/24/2020    LABA1C 5.9 02/22/2019 7/13/20 JW discussed smbg, pt interested, note to MD- Rx sent to pharmacy. Pt to try or bring to RN appt. Getting a one touch meter w insurance. Prevention, detection & treatment of acute complications:  Identify symptoms of hyper & hypoglycemia, and prevention & treatment strategies. 1 6/22/20rs    symptoms - none-    IF for higher BG - bit more more blurry vision   Describe sick day guidelines & indications for  physician notification. Identify short term consequences of poor control. Disaster preparedness strategies    1       Prevention, detection & treatment of chronic complications:  Define the natural course of diabetes & describe the relationship of blood glucose levels to long term complications of diabetes. Identify preventative measures & standards of care. 1    Foot MD - visit 3 months  PCP - checks lab - started on cholesterol med 5/2020    EYE - care not recently - plan to do soon  Dental - plan to see on Wed    Developing strategies to address psychosocial issues:  Describe feelings about living with diabetes; Describe how stress, depression & anxiety affect blood glucose; Identify coping strategies; Identify support needed & support network available. 1 6/22/20rs   Has good support system- SO helps and also in board with we loss plans   Developing strategies to promote health/change behavior: Identify 7 self-care behaviors; Personal health risk factors;  Benefits, challenges & strategies for behavioral change; 1         Individualized goal selection. Goal is weight loss - desire is 30 lbs  Baseline wt is 253lbs ( 6/8/20)               My goal , to help me improve my health, I will:   1. Add in Bk daily - like - eggs,  graf and toast    2. Stay off /  Stop eating  skittles and sweets and regular pop       Plan  Follow-up Appointments planned - in 1 weeks - support info ( class 1 and 2 Sent out 6/8/20) patient stated poor connectivity in home/ limited wifi and prefer phone call for follow up   -- Call with GF and patient for diet classes    Instruction Method: [x]Lecture/Discussion  []Power Point Presentation  [x]Handouts  []Return Demonstration    Education Materials/Equipment Provided (VIA Mail for phone visits)  :    [x]Self-Management - Initial assessment - Enrolment in to ADA  Where do I Begin, Living with Type 2 diabetes ADA home support program and  handout on diabetes education classes. [x]Self-Management  Class 1 -Self-Management  Class 1 - \"Diabetes - your take control guide\" - ADA booklet -  o  \"ready, set, start counting\" teaching sheet in diet section   o  GLP-1 understanding 8 core deficits puzzle sheet in the medication section  o one day food diary and envelope for return of diet HX   o  You tube and website resource sheet-Understanding Type 2 Diabetes from Animated Diabetes Patient [email or web address] -- 6/22/20rs      [x] Self-Management  Class 2 - Meal Plan and handout for serving sizes, smarter snacking, Ready Set Carb Counting / Plate Method, Nutrient Conversion and International Diabetes [address] Eating for People with Diabetes and Nutrition in the [employer] - fast facts about fast food7/13/20 JW    [] Self-Management  Class 3 -  Diabetes your take control guide pages 20 - 30,  type 2 diabetes and the role of GLP- 1,  Individualized Diabetes report card     [] Self-Management Class 4 - BD Booklet  Sick Day [place] and  Dinning Out Guide , recipe hand outs and tips, diabetes Cookbooks  ( when available)     []Self-Management - 3 month follow -  AADE7 Self care behaviors work sheets, 2020 [employer],  Online resource list - March 2020      []Self-Management  Gestational - RN class -Resource materials sent out : care booklet - \" Gestational Diabetes Mellitus ( GDM) toolkit form [place] gestational diabetes postpartum care learning collaborative 2018. \"Simple Guidelines for meal planning with gestational diabetes. SMBG sheets to fax back to M weekly. BD  healthy injection site selection and rotation with 6 mm insulin syringe and 4 mm pen needle. Gestational diabetes handout from [facility] 2016. Did you have gestational diabetes when you were pregnant? Handout from [facility]  April 2014    []Self-Management Gestational - RD class - My Food Plan for Gestational diabetes    []Glucose Meter     []Insulin Kit     []Other      Encounter Type Date Start Time End Time Comments No Show Dates   Assessment 6/8/20    1 30   2 27    []In Person  [x]Telephone    Class 1 - Understanding diabetes 6/22/20rs  1 30   2 30   [x]TelephoneAmerican Diabetes Association  www. diabetes. org    Class 2- Nutrition and diabetes   7/13/20 JW 11:45 1:15 [x] in person, gf came during 2nd 1/2 of appt  Telephone  Healthy Eating with Diabetes- Automatic Data of Diabetes and Digestive and Kidney   [email or web address]    Class 3 - Preventing Complications     []Telephone    Class 4 -  In depth Nutrition and sick day care    []Telephone  Diabetes Food hub  www. diabetesfoodhub.org     Class 5 - 3 month follow up / goal reassessment        Gestational - RN         Gestational - RD        Individual MNT         Shared Med Appt         Yearly Follow-up        Meter Instrx      How to Measure Your Blood Sugar - [facility] Patient Education  [email or web address]    Insulin Instrx      []Pen  []Vial & Syringe   BD Diabetes Care:  How to Inject Insulin with a Pen [email or web address]   Wednesdays-5:00- 6:00 pm       [] Eat Smart  Be Active & Learn How - Free   Families & grandparents with children in home 0- 25 & pregnant moms          Various sites in community - call to find next session near you. [facility] extension office for future sessions - Prasanna Klineing  Email : [email or web address]  Phone: [phone]     [] (SNAP-Ed)   - free nutrition education   - adults and youth, who are eligible for the Supplemental Nutrition Assistance Program    Various sites in community - call to find next session near you. [facility] SNAP-Ed  contact Rylee Styles, Email:[email or web address]           Post Education Referrals:      [] [place] Tobacco Quit information sheet and [address]       [] Dental care - [facility]     [] [facility] link  phone number - for information and referral to [address], WOUND, WEIGHT MANAGEMENT        []Other  Hill Douglas, RD, LD CDE

## 2020-07-13 NOTE — TELEPHONE ENCOUNTER
Pt interested in blood glucose monitoring. He has [employer] which will cover the cost. Will you prescribe a monitor and supplies? Thank you!

## 2020-07-28 ENCOUNTER — HOSPITAL ENCOUNTER (OUTPATIENT)
Dept: DIABETES SERVICES | Age: 45
Setting detail: THERAPIES SERIES
Discharge: HOME | End: 2020-07-28
Payer: MEDICAID

## 2020-07-28 PROCEDURE — G0108 DIAB MANAGE TRN  PER INDIV: HCPCS

## 2020-07-28 NOTE — PROGRESS NOTES
Diabetes Self- Management Education Program Assessment - PHONE  This visit was done on the telephone  (During JMIUD-01 public health emergency). Pursuant to the emergency declaration under the [facility], [address] [place] [address] authority and the [employer] Act, this visit was conducted with patient's (and/or legal guardian's) consent, to reduce the patient's risk of exposure to COVID-19 and provide necessary medical care. The patient (and/or legal guardian) has also been advised to contact this office for worsening conditions or problems, and seek emergency medical treatment and/or call 911 if deemed necessary. Patient identification was verified at the start of the visit: Yes    Total time spent for this encounter: [phone] were provided through a phone discussion to substitute for in-person clinic visit. Patient and provider were located at their individual home/office. MEDICAL HISTORY:  Past Medical History:   Diagnosis Date    Dry skin     GERD (gastroesophageal reflux disease)     Hematuria     HLD (hyperlipidemia) 3/2/2015    Hypertension     AIDEN (obstructive sleep apnea)     Pre-diabetes     Sleep apnea     does not use a machine    Wears dentures     upper     Family History   Problem Relation Age of Onset    High Blood Pressure Mother     Diabetes Father      Patient has no known allergies. Immunization History   Administered Date(s) Administered    Tdap (Boostrix, Adacel) 12/16/2017     Current Medications  Current Outpatient Medications   Medication Sig Dispense Refill    Blood Glucose Monitoring Suppl (ACURA BLOOD GLUCOSE METER) w/Device KIT 1 Device by Does not apply route once for 1 dose 1 kit 0    Lancets MISC 1 each by Does not apply route daily 300 each 1    blood glucose monitor strips Test 1 time a day & as needed for symptoms of irregular blood glucose.  Dispense sufficient amount for indicated testing frequency plus additional to accommodate PRN testing needs. 300 strip 1    atorvastatin (LIPITOR) 20 MG tablet Take 1 tablet by mouth daily 30 tablet 3    lisinopril-hydroCHLOROthiazide (PRINZIDE;ZESTORETIC) 10-12.5 MG per tablet take 1 tablet by mouth once daily 30 tablet 5    UREA 20 INTENSIVE HYDRATING 20 % cream apply to FEET twice a day 85 g 3    Urea (CARMOL) 40 % cream Apply to feet bid 1 Tube 3    ammonium lactate (LAC-HYDRIN) 12 % cream Apply topically as needed. 1 Bottle 4     No current facility-administered medications for this encounter.    :     Comments:  Allergies:  No Known Allergies    A1C blood level   Lab Results   Component Value Date    LABA1C 8.8 05/22/2020    LABA1C 8.1 02/24/2020    LABA1C 5.9 02/22/2019     Lab Results   Component Value Date    CREATININE 0.81 06/03/2020       Blood pressure   BP Readings from Last 3 Encounters:   05/22/20 110/72   02/24/20 124/78   01/28/20 131/83        Cholesterol  Lab Results   Component Value Date    LDLCHOLESTEROL 102 06/03/2020        Diabetes Self- Management Education Record    Participant Name: Luke Grady  Referring Provider: TRES Wilburn CNP   Assessment/Evaluation Ratings:  1=Needs Instruction   4=Demonstrates Understanding/Competency  2=Needs Review   NC=Not Covered    3=Comprehends Key Points  N/A=Not Applicable  Topics/Learning Objectives Pre-session Assess Date:  6/8/20rs Instr. Date Reinforce Date Post- session Eval Comments   Diabetes disease process & Treatment process: Define diabetes & pre-diabetes; Identify own type of diabetes; role of the pancreas; signs/symptoms; diagnostic criteria; prevention & treatment options; contributing factors. 1 6/22/20rs   Wt up 30 lbs in last 3 months - change with eating and activity with covid - 19      Incorporating nutritional management into lifestyle: Describe effect of type, amount & timing of food on blood glucose;  Describe basic meal planning techniques & current nutrition guideline   Bk - skips  Water in am   On b/p and cholesterol meds  No BG checks    Loren - sandwiches - gatraid Zero   ( switched from regular pop)  + salad     -skittles prior    Dn-   spaghetti - lasagna   Pizza   + salad-  Banana peppers - ham   Dressing   - ranch and allen    -last night more food / sweets    Sweets and pop and fried food in past were  7/13/20JW- GF came to 2nd 1/2 of session. showed video tape of healthy eating. Asked about ETOH, different sf beverages. Likes hot/spicy foods. GF loves to cook and try new recipes. What to eat - Food groups, When to eat - timing of meals and snacks, and How much to eat - portions control. Used dinner plate method. If desires more portion info suggest  [id number]   calories/ day   CHO choices/ meal   CHO choices/  day   grams of protein /day   gram of fat /day     Correctly read food labels & demonstrate CHO counting & portion control with personalized meal plan. Identify dining out strategies, & dietary sick day guidelines. 1  GF - doing shopping / cooking 7/13/20 JW general, need to give more detail at next visit. Incorporating physical activity into lifestyle:   Verbalize effect of exercise on blood glucose levels; benefits of regular exercise; safety considerations; contraindications; maintenance of activity. 1 6/22/20rs   Some numbness in feet   - activity - walk dog and ride bike - start to jog   Using medications safely:  Identify effects of diabetes medicines on blood glucose levels; List diabetes medication taken, action & side effects;    1 7/13/20 JW   - none for DM. Pt wants to avoid meds, knows he needs to make lifestyle changes. -7/28/20- discussed metformin as an option    Insulin / Injectable - Appropriate injection sites; proper storage; supplies needed; proper technique; safe needle disposal guidelines.    1 n/a      Monitoring blood glucose, interpreting and using results:  Identify recommended & personal blood glucose targets; importance of testing; testing supplies; HgbA1C target levels; Factors affecting blood glucose; Importance of logging blood glucose levels for pattern recognition; ketone testing; safe lancet disposal.   1 6/22/20rs    7/28/20rs   Only follows A1C - per chart review - A1C of 6.6 3/2017 and more recent   Lab Results   Component Value Date    LABA1C 8.8 05/22/2020    LABA1C 8.1 02/24/2020    LABA1C 5.9 02/22/2019 7/13/20 JW discussed smbg, pt interested, note to MD- Rx sent to pharmacy. Pt to try or bring to RN appt. Getting a one touch meter w insurance. 7/28/20rs -now has new BG meter - helped patient over phone to set up and start to use the meter - one verio flex - the meter is on back order per backorder - will use sample meter from [employer] for patient      Prevention, detection & treatment of acute complications:  Identify symptoms of hyper & hypoglycemia, and prevention & treatment strategies. 1 6/22/20rs    symptoms - none-    IF for higher BG - bit more more blurry vision   Describe sick day guidelines & indications for  physician notification. Identify short term consequences of poor control. Disaster preparedness strategies    1       Prevention, detection & treatment of chronic complications:  Define the natural course of diabetes & describe the relationship of blood glucose levels to long term complications of diabetes. Identify preventative measures & standards of care. 1 7/28/20rs   Foot MD - visit 3 months - following up     PCP - checks lab - started on cholesterol med 5/2020    EYE - care not recently - plan to do soon  - vision is better - now 7/28/20rs    Dental - plan to see on Wed - saw dentist      Developing strategies to address psychosocial issues:  Describe feelings about living with diabetes; Describe how stress, depression & anxiety affect blood glucose; Identify coping strategies;  Identify support needed & support network available. 1 6/22/20rs   Has good support system- SO helps and also in board with we loss plans   Developing strategies to promote health/change behavior: Identify 7 self-care behaviors; Personal health risk factors; Benefits, challenges & strategies for behavioral change;    1 7/28/20rs     7/28/20rs- working on starting to check BG and eating healthy food with support of his GF    Individualized goal selection. Goal is weight loss - desire is 30 lbs  Baseline wt is 253lbs ( 6/8/20)               My goal , to help me improve my health, I will:   1. Add in Bk daily - like - eggs,  graf and toast    2. Stay off /  Stop eating  skittles and sweets and regular pop       Plan  Follow-up Appointments planned - in 1 weeks - support info ( class 1 and 2 Sent out 6/8/20) patient stated poor connectivity in home/ limited wifi and prefer phone call for follow up   -- Call with GF and patient for diet classes    Instruction Method: [x]Lecture/Discussion  []Power Point Presentation  [x]Handouts  []Return Demonstration    Education Materials/Equipment Provided (VIA Mail for phone visits)  :    [x]Self-Management - Initial assessment - Enrolment in to ADA  Where do I Begin, Living with Type 2 diabetes ADA home support program and  handout on diabetes education classes. [x]Self-Management  Class 1 -Self-Management  Class 1 - \"Diabetes - your take control guide\" - ADA booklet -  o  \"ready, set, start counting\" teaching sheet in diet section   o  GLP-1 understanding 8 core deficits puzzle sheet in the medication section  o one day food diary and envelope for return of diet HX   o  You tube and website resource sheet-Understanding Type 2 Diabetes from Animated Diabetes Patient [email or web address] -- 6/22/20rs      [x] Self-Management  Class 2 - Meal Plan and handout for serving sizes, smarter snacking, Ready Set Carb Counting / Plate Method, Nutrient Conversion and [address] Eating for People with Diabetes and Nutrition in the [employer] - fast facts about fast food7/13/20 JW    [x] Self-Management  Class 3 -  Diabetes your take control guide pages 21 - 30,  type 2 diabetes and the role of GLP- 1 _- 7/28/20rs    [] Self-Management Class 4 - BD Booklet  Sick Day [place] and  Dinning Out Guide , recipe hand outs and tips, diabetes Cookbooks  ( when available)     []Self-Management - 3 month follow -  AADE7 Self care behaviors work sheets, 2020 [employer],  Online resource list - March 2020      []Self-Management  Gestational - RN class -Resource materials sent out : care booklet - \" Gestational Diabetes Mellitus ( GDM) toolkit form [place] gestational diabetes postpartum care learning collaborative 2018. \"Simple Guidelines for meal planning with gestational diabetes. SMBG sheets to fax back to [facility] weekly. BD  healthy injection site selection and rotation with 6 mm insulin syringe and 4 mm pen needle. Gestational diabetes handout from [address] [place] [address],[address] 2016. Did you have gestational diabetes when you were pregnant? Handout from [facility]  April 2014    []Self-Management Gestational - RD class - My Food Plan for Gestational diabetes    [x]Glucose Meter 7/28/20rs--    []Insulin Kit     []Other      Encounter Type Date Start Time End Time Comments No Show Dates   Assessment 6/8/20    1 30   2 30    []In Person  [x]Telephone    Class 1 - Understanding diabetes 6/22/20rs  1 30   2 30   [x]TelephoneAmerican Diabetes Association  www. diabetes. org    Class 2- Nutrition and diabetes   7/13/20 JW 11:45 1:15 [x] in person, gf came during 2nd 1/2 of appt  Telephone  Healthy Eating with Diabetes- Automatic Data of Diabetes and Digestive and Kidney   [email or web address]://[email or web address] be/[email or web address]    Class 3 - Preventing Complications 6/76/52DR 11 35   12 10  [x]Telephone    Class 4 -  In depth Nutrition and sick day care    []Telephone  Diabetes Food hub  www. diabetesfoodhub.org     Class 5 - 3 month follow up / goal reassessment        Gestational - RN         Gestational - RD        Individual MNT         Shared Med Appt         Yearly Follow-up        Meter Instrx      How to Measure Your Blood Sugar - [facility] Patient Education  [email or web address] [email or web address]    Insulin Instrx      []Pen  []Vial & Syringe   BD Diabetes Care: How to Inject Insulin with a Pen Needle  [email or web address] [email or web address]    Diabetes Care: How to Inject Insulin with a Syringe  [email or web address]://[email or web address] [email or web address]       DSMS Support :   [] MNT      [] Annual update     [] Starting Fresh  adults living with diabetes or pre diabetes. [address] [place], [place]    Rkkhfydwn-26cgwo-3:30pm- on 6 week rotation  Free -  REGISTRATION IS REQUIRED -  [phone] call for dates    []  Diabetes Group at  [address] - Free 6 week diabetes education support   classes - contact : Karen Zheng [phone]  for dates and locations      []ADA  Where do I Begin, Living with Type 2 diabetes ADA home support program    Web site: diabetes. org/living    Call: 1800 DIABETES  e-mail: Jo@[email or web address]. org     []  Internet web sites - ADAWeb site: www.diabetes. org       [] [facility] opens at 8 30 am daily for walkers, shops open at 10 am   [facility], [address] [place] [address]   [phone] Daily - 8:30am - 10am    3rd Tuesday of every month [facility] expert speakers visit from 9 - 10am        [] [address] [place] [address], [facility], [facility], [place], [facility] (site rotate)  Call [phone] or visit   website: [email or web address] for more details   Check web site for updated times/ dates       [] [address]  [address] [facility], [address] [address] [place] [address] Tuesday and Thursday -   9 :30 am- 10:30am - ongoing   [] [address] classes   [facility]  [address] [facility] [place]  [place], [address] Thursday 11:00am - 11:45am     [] Third Wednesday Cooking  Class  Free  Registration is required     [facility]. [facility], [facility] [phone] or   Email [email or web address]   Wednesdays-5:00- 6:00 pm       [] Eat Smart  Be Active & Learn How - Free   Families & grandparents with children in home 0- 25 & pregnant moms          Various sites in community - call to find next session near you. [facility] extension office for future sessions - [place]  Email : [email or web address]  Phone: [phone]     [] (SNAP-Ed)   - free nutrition education   - adults and youth, who are eligible for the Supplemental Nutrition Assistance Program    Various sites in community - call to find next session near you. [facility] SNAP-Ed  contact Ran Batista, Email:[email or web address]           Post Education Referrals:      [] [place] Tobacco Quit information sheet and [address] , [address]       [] Dental care - [facility]     [] [facility] ([facility]) link  phone number - for information and referral to [address]  Clinically  [facility], WEIGHT MANAGEMENT        []Other  Karen Villalobos RN CDE

## 2020-07-30 ENCOUNTER — HOSPITAL ENCOUNTER (OUTPATIENT)
Dept: DIABETES SERVICES | Age: 45
Setting detail: THERAPIES SERIES
Discharge: HOME | End: 2020-07-30
Payer: MEDICAID

## 2020-07-30 VITALS — WEIGHT: 243 LBS | BODY MASS INDEX: 39.22 KG/M2

## 2020-07-30 PROCEDURE — G0108 DIAB MANAGE TRN  PER INDIV: HCPCS

## 2020-07-30 NOTE — PROGRESS NOTES
Diabetes Self- Management Education Program  - face to face visit for DSME and training on use of home BG meter - 7/30/20 300 pm - 3 45 pm    MEDICAL HISTORY:  Past Medical History:   Diagnosis Date    Dry skin     GERD (gastroesophageal reflux disease)     Hematuria     HLD (hyperlipidemia) 3/2/2015    Hypertension     AIDEN (obstructive sleep apnea)     Pre-diabetes     Sleep apnea     does not use a machine    Wears dentures     upper     Family History   Problem Relation Age of Onset    High Blood Pressure Mother     Diabetes Father      Patient has no known allergies. Immunization History   Administered Date(s) Administered    Tdap (Boostrix, Adacel) 12/16/2017     Current Medications  Current Outpatient Medications   Medication Sig Dispense Refill    Blood Glucose Monitoring Suppl (ACURA BLOOD GLUCOSE METER) w/Device KIT 1 Device by Does not apply route once for 1 dose 1 kit 0    Lancets MISC 1 each by Does not apply route daily 300 each 1    blood glucose monitor strips Test 1 time a day & as needed for symptoms of irregular blood glucose. Dispense sufficient amount for indicated testing frequency plus additional to accommodate PRN testing needs. 300 strip 1    atorvastatin (LIPITOR) 20 MG tablet Take 1 tablet by mouth daily 30 tablet 3    lisinopril-hydroCHLOROthiazide (PRINZIDE;ZESTORETIC) 10-12.5 MG per tablet take 1 tablet by mouth once daily 30 tablet 5    UREA 20 INTENSIVE HYDRATING 20 % cream apply to FEET twice a day 85 g 3    Urea (CARMOL) 40 % cream Apply to feet bid 1 Tube 3    ammonium lactate (LAC-HYDRIN) 12 % cream Apply topically as needed.  1 Bottle 4     No current facility-administered medications for this encounter.    :     Comments:  Allergies:  No Known Allergies    A1C blood level   Lab Results   Component Value Date    LABA1C 8.8 05/22/2020    LABA1C 8.1 02/24/2020    LABA1C 5.9 02/22/2019     Lab Results   Component Value Date    CREATININE 0.81 06/03/2020 Blood pressure   BP Readings from Last 3 Encounters:   05/22/20 110/72   02/24/20 124/78   01/28/20 131/83        Cholesterol  Lab Results   Component Value Date    LDLCHOLESTEROL 102 06/03/2020        Diabetes Self- Management Education Record    Participant Name: Leslie Bae  Referring Provider: Carolyn Duane, APRN - CNP   Assessment/Evaluation Ratings:  1=Needs Instruction   4=Demonstrates Understanding/Competency  2=Needs Review   NC=Not Covered    3=Comprehends Key Points  N/A=Not Applicable  Topics/Learning Objectives Pre-session Assess Date:  6/8/20rs Instr. Date Reinforce Date Post- session Eval Comments   Diabetes disease process & Treatment process: Define diabetes & pre-diabetes; Identify own type of diabetes; role of the pancreas; signs/symptoms; diagnostic criteria; prevention & treatment options; contributing factors. 1 6/22/20rs   Wt up 30 lbs in last 3 months - change with eating and activity with covid - 19     7/30/20inoffice today and wt is down 10 lbs ar 243 lbs     Incorporating nutritional management into lifestyle: Describe effect of type, amount & timing of food on blood glucose; Describe basic meal planning techniques & current nutrition guideline   Bk - skips  Water in am   On b/p and cholesterol meds  No BG checks    Loren - sandwiches - gatraid Zero   ( switched from regular pop)  + salad     -skittles prior    Dn-   spaghetti - lasagna   Pizza   + salad-  Banana peppers - ham   Dressing   - ranch and allen    -last night more food / sweets    Sweets and pop and fried food in past were  7/13/20JW- GF came to 2nd 1/2 of session. showed video tape of healthy eating. Asked about ETOH, different sf beverages. Likes hot/spicy foods. GF loves to cook and try new recipes. What to eat - Food groups, When to eat - timing of meals and snacks, and How much to eat - portions control. Used dinner plate method.  If desires more portion info suggest  [phone]   calories/ day   CHO V stock for patient     7/30/20 - set up and patient used one touch verio meter  -  Patient RX for strips are verio strips and work with the meter. provided universal lancet device - as pt had true metrix 33 marcelina lancet dispensed from pharmacy and the one touch delica device will not work with the true Metrix lancets. With coaching Russell Guevara was able to sary his finger tip and apply blood to strip in the device - BG was 103 - explained the one touch debra and also provided a log book for recording ,Made plan for start with am / fasting BG checks, then add in 2 hour post meal BG checks - keep log and follow up in one week with CDE on phone call visit. Next A1c reviewed to be due by end of Aug - mireya to call NP for appt or lab slip     Prevention, detection & treatment of acute complications:  Identify symptoms of hyper & hypoglycemia, and prevention & treatment strategies. 1 6/22/20rs    symptoms - none-    IF for higher BG - bit more more blurry vision   Describe sick day guidelines & indications for  physician notification. Identify short term consequences of poor control. Disaster preparedness strategies    1       Prevention, detection & treatment of chronic complications:  Define the natural course of diabetes & describe the relationship of blood glucose levels to long term complications of diabetes. Identify preventative measures & standards of care. 1 7/28/20rs   Foot MD - visit 3 months - following up - next visit on 8/5/2020    PCP - checks lab - started on cholesterol med 5/2020    EYE - care not recently - plan to do soon  - vision is better - now 7/28/20rs    Dental - plan to see on Wed - saw dentist      Developing strategies to address psychosocial issues:  Describe feelings about living with diabetes; Describe how stress, depression & anxiety affect blood glucose; Identify coping strategies; Identify support needed & support network available.  1 6/22/20rs   Has good support system- SO helps and also in board with we loss plans   Developing strategies to promote health/change behavior: Identify 7 self-care behaviors; Personal health risk factors; Benefits, challenges & strategies for behavioral change;    1 7/28/20rs     7/28/20rs- working on starting to check BG and eating healthy food with support of his GF    Individualized goal selection. Goal is weight loss - desire is 30 lbs  Baseline wt is 253lbs ( 6/8/20)               My goal , to help me improve my health, I will:   1. Add in Bk daily - like - eggs,  graf and toast    2. Stay off /  Stop eating  skittles and sweets and regular pop       Plan  Follow-up Appointments planned - in 1 weeks -  --in person and phone call followup    Instruction Method: [x]Lecture/Discussion  []Power Point Presentation  [x]Handouts  []Return Demonstration    Education Materials/Equipment Provided (VIA Mail for phone visits)  :    [x]Self-Management - Initial assessment - Enrolment in to ADA  Where do I Begin, Living with Type 2 diabetes ADA home support program and  handout on diabetes education classes. [x]Self-Management  Class 1 -Self-Management  Class 1 - \"Diabetes - your take control guide\" - ADA booklet -  o  \"ready, set, start counting\" teaching sheet in diet section   o  GLP-1 understanding 8 core deficits puzzle sheet in the medication section  o one day food diary and envelope for return of diet HX   o  You tube and website resource sheet-Understanding Type 2 Diabetes from Animated Diabetes Patient [email or web address]://[email or web address] be/[email or web address] -- 6/22/20rs      [x] Self-Management  Class 2 - Meal Plan and handout for serving sizes, smarter snacking, Ready Set Carb Counting / Plate Method, Nutrient Conversion and International Diabetes [address] Eating for People with Diabetes and Nutrition in the [employer] - fast facts about fast food7/13/20 JW    [x] Self-Management  Class 3 -  Diabetes your take control guide pages 20 - 30,  type 2 diabetes and the role of GLP- 1 _- 7/28/20rs and 7/30/20rs     [] Self-Management Class 4 - BD Booklet  Sick Day [place] and  Dinning Out Guide , recipe hand outs and tips, diabetes Cookbooks  ( when available)     []Self-Management - 3 month follow -  AADE7 Self care behaviors work sheets, 2020 [employer],  Online resource list - March 2020      []Self-Management  Gestational - RN class -Resource materials sent out : care booklet - \" Gestational Diabetes Mellitus ( GDM) toolkit form [place] gestational diabetes postpartum care learning collaborative 2018. \"Simple Guidelines for meal planning with gestational diabetes. SMBG sheets to fax back to [facility] weekly. BD  healthy injection site selection and rotation with 6 mm insulin syringe and 4 mm pen needle. Gestational diabetes handout from [facility] 2016. Did you have gestational diabetes when you were pregnant? Handout from [facility]  April 2014    []Self-Management Gestational - RD class - My Food Plan for Gestational diabetes    [x]Glucose Meter 7/28/20rs--    []Insulin Kit     []Other      Encounter Type Date Start Time End Time Comments No Show Dates   Assessment 6/8/20    1 30   2 30    []In Person  [x]Telephone    Class 1 - Understanding diabetes 6/22/20rs  1 30   2 30   [x]TelephoneAmerican Diabetes Association  www. diabetes. org    Class 2- Nutrition and diabetes   7/13/20 JW 11:45 1:15 [x] in person, gf came during 2nd 1/2 of appt  Telephone  Healthy Eating with Diabetes- Automatic Data of Diabetes and Digestive and Kidney   https://youtu. be/[email or web address]    Class 3 - Preventing Complications 2/50/13AT 11 35   12 10  [x]Telephone    Class 4 -  In depth Nutrition and sick day care    []Telephone  Diabetes Food hub  www. diabetesfoodhub.org     Class 5 - 3 month follow up / goal reassessment        Gestational - RN         Gestational - RD        Individual MNT         Shared Med Appt         Yearly Follow-up        Meter Instrx 7/30/20  300  3 45 pm   How to Measure Your Blood Sugar - [facility] Patient Education  https://youtu. be/[email or web address]    Insulin Instrx      []Pen  []Vial & Syringe   BD Diabetes Care: How to Inject Insulin with a Pen Needle  https://youtu. [email or web address]    Diabetes Care: How to Inject Insulin with a Syringe  https://youtu. be/[email or web address]       DSMS Support :   [] MNT      [] Annual update     [] Starting Fresh  adults living with diabetes or pre diabetes. [address] [place], [place]    [id number]-0:30pm- on 6 week rotation  Free -  REGISTRATION IS REQUIRED - [place] [phone] call for dates    []  Diabetes Group at  [address] - Free 6 week diabetes education support   classes - contact : Karen Zheng [phone]  for dates and locations      []ADA  Where do I Begin, Living with Type 2 diabetes ADA home support program    Web site: diabetes. org/living    Call: 1800 DIABETES  e-mail: Neema@[employer]     []  Internet web sites - ADAWeb site: www.diabetes. org       [] [facility] opens at 8 30 am daily for walkers, shops open at 10 am   [address] [place] [address], [address] [place] [address]   [phone] Daily - 8:30am - 10am    3rd Tuesday of every month [facility] expert speakers visit from 9 - 10am        [] [facility], [facility], [facility], [place], [facility] (site rotate)  Call [phone] or visit   website: [email or web address] for more details   Check web site for updated times/ dates       [] [address]  [address] [facility], [address] [address] Tuesday and Thursday -   9 :30 am- 10:30am - ongoing   [] [address]  [address] [facility], [address] [address] [facility] Thursday 11:00am - 11:45am     [] Third Wednesday Cooking  Class  Free  Registration is required     [facility]. 1100 [facility], [facility] [phone] or   Email [email or web address]   Wednesdays-5:00- 6:00 pm       [] Eat Smart  Be Active & Learn How - Free   Families & grandparents with children in home 0- 25 & pregnant moms          Various sites in community - call to find next session near you. [facility] extension office for future sessions - Miriam Bergman  Email : [email or web address]  Phone: [phone]     [] (SNAP-Ed)   - free nutrition education   - adults and youth, who are eligible for the Supplemental Nutrition Assistance Program    Various sites in community - call to find next session near you. [facility] GEORGE-CRANBERRY-ER SNAP-Ed  contact Isadora Fraga, Email:[email or web address]           Post Education Referrals:      [] [place] Tobacco Quit information sheet and [address] [facility] [facility] , [address]       [] Dental care - [facility]     [] [facility]) link  phone number - for information and referral to [facility] RIMA LEW  Clinically  [address], WEIGHT MANAGEMENT        []Other  Kyra Roy, RN CDE

## 2020-08-05 ENCOUNTER — OFFICE VISIT (OUTPATIENT)
Dept: PODIATRY | Age: 45
End: 2020-08-05
Payer: MEDICAID

## 2020-08-05 VITALS — WEIGHT: 241 LBS | RESPIRATION RATE: 16 BRPM | TEMPERATURE: 98.6 F | BODY MASS INDEX: 38.73 KG/M2 | HEIGHT: 66 IN

## 2020-08-05 PROCEDURE — 1036F TOBACCO NON-USER: CPT | Performed by: PODIATRIST

## 2020-08-05 PROCEDURE — 99213 OFFICE O/P EST LOW 20 MIN: CPT | Performed by: PODIATRIST

## 2020-08-05 PROCEDURE — G8427 DOCREV CUR MEDS BY ELIG CLIN: HCPCS | Performed by: PODIATRIST

## 2020-08-05 PROCEDURE — G8417 CALC BMI ABV UP PARAM F/U: HCPCS | Performed by: PODIATRIST

## 2020-08-05 PROCEDURE — 11721 DEBRIDE NAIL 6 OR MORE: CPT | Performed by: PODIATRIST

## 2020-08-05 PROCEDURE — 17110 DESTRUCTION B9 LES UP TO 14: CPT | Performed by: PODIATRIST

## 2020-08-05 RX ORDER — UREA 40 %
CREAM (GRAM) TOPICAL
Qty: 1 TUBE | Refills: 3 | Status: SHIPPED | OUTPATIENT
Start: 2020-08-05 | End: 2022-05-11 | Stop reason: SDUPTHER

## 2020-08-05 NOTE — PROGRESS NOTES
[facility]  [facility]  [address] [place] [address]  Dept: [phone]  Dept Fax: [phone]     PAIN PROGRESS NOTE  Date of patient's visit: 8/5/2020  Patient's Name:  Veronica Montez YOB: 1975            Patient Care Team:  TRES Joe CNP as PCP - General (Certified Nurse Practitioner)  TRES Joe CNP as PCP - [address] [place] [address] [facility]  Rachel Ordaz MD as Consulting Physician (Ophthalmology)  Santana Sandoval DPM as Physician (Podiatry)      Chief Complaint   Patient presents with    Nail Problem    Benign Neoplasm       Subjective: This Veronica Montez comes to clinic for foot and nail care. Pt currently has complaint of thickened, painful, elongated nails that he/she cannot manage by themselves. Pt. Relates pain to nails with shoe gear. Pt's primary care physician is TRES Doyle CNP last seen 7/13/20. Pt has  new complaint of  Increased dry skin and painful skin lesions to jhonny feet. Past Medical History:   Diagnosis Date    Dry skin     GERD (gastroesophageal reflux disease)     Hematuria     HLD (hyperlipidemia) 3/2/2015    Hypertension     AIDEN (obstructive sleep apnea)     Pre-diabetes     Sleep apnea     does not use a machine    Wears dentures     upper       No Known Allergies  Current Outpatient Medications on File Prior to Visit   Medication Sig Dispense Refill    Lancets MISC 1 each by Does not apply route daily 300 each 1    blood glucose monitor strips Test 1 time a day & as needed for symptoms of irregular blood glucose. Dispense sufficient amount for indicated testing frequency plus additional to accommodate PRN testing needs.  300 strip 1    atorvastatin (LIPITOR) 20 MG tablet Take 1 tablet by mouth daily 30 tablet 3    lisinopril-hydroCHLOROthiazide (PRINZIDE;ZESTORETIC) 10-12.5 MG per tablet take 1 tablet by mouth once daily 30 tablet 5    UREA 20 INTENSIVE HYDRATING 20 % cream apply to FEET twice a day 85 g 3    Urea (CARMOL) 40 % cream Apply to feet bid 1 Tube 3    ammonium lactate (LAC-HYDRIN) 12 % cream Apply topically as needed. 1 Bottle 4    Blood Glucose Monitoring Suppl (ACURA BLOOD GLUCOSE METER) w/Device KIT 1 Device by Does not apply route once for 1 dose 1 kit 0     No current facility-administered medications on file prior to visit. Review of Systems. Review of Systems:   History obtained from chart review and the patient  General ROS: negative for - chills, fatigue, fever, night sweats or weight gain  Constitutional: Negative for chills, diaphoresis, fatigue, fever and unexpected weight change. Musculoskeletal: Positive for arthralgias, gait problem and joint swelling. Neurological ROS: negative for - behavioral changes, confusion, headaches or seizures. Negative for weakness and numbness. Dermatological ROS: negative for - mole changes, rash  Cardiovascular: Negative for leg swelling. Gastrointestinal: Negative for constipation, diarrhea, nausea and vomiting. Objective:  Dermatologic Exam:  Soft tissue lesion to the plantar right and left foot x 3 with central core and petechiae. Pain on palpation of lesion. .Skin lesion/ulceration Absent . Skin No rashes or nodules noted. .   Skin is thin, with flaky sloughing skin as well as decreased hair growth to the lower leg  Small red hemosiderin deposits seen dorsal foot   Musculoskeletal:     1st MPJ ROM decreased, Bilateral.  Muscle strength 5/5, Bilateral.  Pain present upon palpation of toenails 1-5, Bilateral. decreased medial longitudinal arch, Bilateral.  Ankle ROM decreased,Bilateral.    Dorsally contracted digits present digits 2, Bilateral.     Vascular: DP pulses 1/4 bilateral.  PT pulses 0/4 bilateral.   CFT <5 seconds, Bilateral.  Hair growth absent to the level of the digits, Bilateral.  Edema present, Bilateral.  Varicosities absent, Bilateral. Erythema

## 2020-08-06 ENCOUNTER — HOSPITAL ENCOUNTER (OUTPATIENT)
Dept: DIABETES SERVICES | Age: 45
Setting detail: THERAPIES SERIES
Discharge: HOME | End: 2020-08-06
Payer: MEDICAID

## 2020-08-07 ENCOUNTER — HOSPITAL ENCOUNTER (OUTPATIENT)
Dept: DIABETES SERVICES | Age: 45
Setting detail: THERAPIES SERIES
Discharge: HOME | End: 2020-08-07
Payer: MEDICAID

## 2020-08-07 PROCEDURE — G0108 DIAB MANAGE TRN  PER INDIV: HCPCS

## 2020-08-07 NOTE — PROGRESS NOTES
Diabetes Self- Management Education Program  -  This visit is a Telephone  session (During FTJVD-48 public health emergency). Pursuant to the emergency declaration under the [facility], [address] [place] [address] authority and the [employer] and [employer] Act, this Telephone visit was conducted with patient's (and/or legal guardian's) consent, to reduce the patient's risk of exposure to COVID-19 and provide necessary medical care. The patient (and/or legal guardian) has also been advised to contact this office for worsening conditions or problems, and seek emergency medical treatment and/or call 911 if deemed necessary. Patient identification was verified at the start of the visit: Yes    Total time spent for this encounter:  30 min    Services were provided through via phone discussion to substitute for in-person clinic visit. Patient and provider were located at their individual home/office. MEDICAL HISTORY:  Past Medical History:   Diagnosis Date    Dry skin     GERD (gastroesophageal reflux disease)     Hematuria     HLD (hyperlipidemia) 3/2/2015    Hypertension     AIDEN (obstructive sleep apnea)     Pre-diabetes     Sleep apnea     does not use a machine    Wears dentures     upper     Family History   Problem Relation Age of Onset    High Blood Pressure Mother     Diabetes Father      Patient has no known allergies.    Immunization History   Administered Date(s) Administered    Tdap (Boostrix, Adacel) 12/16/2017     Current Medications  Current Outpatient Medications   Medication Sig Dispense Refill    Urea (CARMOL) 40 % cream Apply to feet bid 1 Tube 3    Blood Glucose Monitoring Suppl (ACURA BLOOD GLUCOSE METER) w/Device KIT 1 Device by Does not apply route once for 1 dose 1 kit 0    Lancets MISC 1 each by Does not apply route daily 300 each 1    blood glucose monitor strips Test 1 time a day & as needed for symptoms of irregular blood glucose. Dispense sufficient amount for indicated testing frequency plus additional to accommodate PRN testing needs. 300 strip 1    atorvastatin (LIPITOR) 20 MG tablet Take 1 tablet by mouth daily 30 tablet 3    lisinopril-hydroCHLOROthiazide (PRINZIDE;ZESTORETIC) 10-12.5 MG per tablet take 1 tablet by mouth once daily 30 tablet 5    UREA 20 INTENSIVE HYDRATING 20 % cream apply to FEET twice a day 85 g 3    ammonium lactate (LAC-HYDRIN) 12 % cream Apply topically as needed. 1 Bottle 4     No current facility-administered medications for this encounter.    :     Comments:  Allergies:  No Known Allergies    A1C blood level   Lab Results   Component Value Date    LABA1C 8.8 05/22/2020    LABA1C 8.1 02/24/2020    LABA1C 5.9 02/22/2019     Lab Results   Component Value Date    CREATININE 0.81 06/03/2020       Blood pressure   BP Readings from Last 3 Encounters:   05/22/20 110/72   02/24/20 124/78   01/28/20 131/83        Cholesterol  Lab Results   Component Value Date    LDLCHOLESTEROL 102 06/03/2020        Diabetes Self- Management Education Record    Participant Name: Keira Kilpatrick  Referring Provider: TRES Montelongo CNP   Assessment/Evaluation Ratings:  1=Needs Instruction   4=Demonstrates Understanding/Competency  2=Needs Review   NC=Not Covered    3=Comprehends Key Points  N/A=Not Applicable  Topics/Learning Objectives Pre-session Assess Date:  6/8/20rs Instr. Date Reinforce Date Post- session Eval Comments   Diabetes disease process & Treatment process: Define diabetes & pre-diabetes; Identify own type of diabetes; role of the pancreas; signs/symptoms; diagnostic criteria; prevention & treatment options; contributing factors.  1 6/22/20rs   Wt up 30 lbs in last 3 months - change with eating and activity with covid - 19     7/30/20inoffice today and wt is down 10 lbs ar 243 lbs   - now down 241lbs - 8/7/20RS     Incorporating nutritional management into lifestyle: Describe effect of type, amount & timing of food on blood glucose; Describe basic meal planning techniques & current nutrition guideline   Bk - skips  Water in am   On b/p and cholesterol meds  No BG checks    Loren - sandwiches - gatraid Zero   ( switched from regular pop)  + salad     -skittles prior    Dn-   spaghetti - lasagna   Pizza   + salad-  Banana peppers - ham   Dressing   - ranch and allen    -last night more food / sweets    Sweets and pop and fried food in past were  7/13/20JW- GF came to 2nd 1/2 of session. showed video tape of healthy eating. Asked about ETOH, different sf beverages. Likes hot/spicy foods. GF loves to cook and try new recipes. - stated less breads and sweets  8/7/20rs - disusedisducssed ice cream and milk shakes - portion control   What to eat - Food groups, When to eat - timing of meals and snacks, and How much to eat - portions control. Used dinner plate method. If desires more portion info suggest  [id number]   calories/ day   CHO choices/ meal   CHO choices/  day   grams of protein /day   gram of fat /day     Correctly read food labels & demonstrate CHO counting & portion control with personalized meal plan. Identify dining out strategies, & dietary sick day guidelines. 1  GF - doing shopping / cooking 7/13/20 JW general, need to give more detail at next visit. 8/7/20 - discussed sick day food choices      Incorporating physical activity into lifestyle:   Verbalize effect of exercise on blood glucose levels; benefits of regular exercise; safety considerations; contraindications; maintenance of activity. 1 6/22/20rs   Some numbness in feet   - activity - walk dog and ride bike - start to jog   Using medications safely:  Identify effects of diabetes medicines on blood glucose levels; List diabetes medication taken, action & side effects;    1 7/13/20 JW   - none for DM. Pt wants to avoid meds, knows he needs to make lifestyle changes.     -7/28/20- discussed metformin as an option Insulin / Injectable - Appropriate injection sites; proper storage; supplies needed; proper technique; safe needle disposal guidelines. 1 n/a      Monitoring blood glucose, interpreting and using results:  Identify recommended & personal blood glucose targets; importance of testing; testing supplies; HgbA1C target levels; Factors affecting blood glucose; Importance of logging blood glucose levels for pattern recognition; ketone testing; safe lancet disposal.   1 6/22/20rs    7/28/20rs   Only follows A1C - per chart review - A1C of 6.6 3/2017 and more recent   Lab Results   Component Value Date    LABA1C 8.8 05/22/2020    LABA1C 8.1 02/24/2020    LABA1C 5.9 02/22/2019 7/13/20 JW discussed smbg, pt interested, note to MD- Rx sent to pharmacy. Pt to try or bring to RN appt. Getting a one touch meter w insurance. 7/28/20rs -now has new BG meter - helped patient over phone to set up and start to use the meter - one verio flex - the meter is on back order per backorder - will use sample meter from [address] [place] [address] for patient     7/30/20 - set up and patient used one touch verio meter  -  Patient RX for strips are verio strips and work with the meter. provided universal lancet device - as pt had true metrix 33 marcelina lancet dispensed from pharmacy and the one touch delica device will not work with the true Metrix lancets. With coaching Shirley Bazan was able to sary his finger tip and apply blood to strip in the device - BG was 103 - explained the one touch debra and also provided a log book for recording ,Made plan for start with am / fasting BG checks, then add in 2 hour post meal BG checks - keep log and follow up in one week with CDE on phone call visit.   Next A1c reviewed to be due by end of Aug - mireya to call NP for appt or lab slip    8/7/20 rs - patient stated BG in 110 - 120 range when he checks - some fasting and some random - reminded to make appt with PCP soon for follow up A1 C     Prevention, detection & and sweets and regular pop       Plan  Follow-up Appointments planned - in 12weeks - phone call followup    Instruction Method: [x]Lecture/Discussion  []Power Point Presentation  [x]Handouts  []Return Demonstration    Education Materials/Equipment Provided (VIA Mail for phone visits)  :    [x]Self-Management - Initial assessment - Enrolment in to ADA  Where do I Begin, Living with Type 2 diabetes ADA home support program and  handout on diabetes education classes. [x]Self-Management  Class 1 -Self-Management  Class 1 - \"Diabetes - your take control guide\" - ADA booklet -  o  \"ready, set, start counting\" teaching sheet in diet section   o  GLP-1 understanding 8 core deficits puzzle sheet in the medication section  o one day food diary and envelope for return of diet HX   o  You tube and website resource sheet-Understanding Type 2 Diabetes from Animated Diabetes Patient [email or web address] -- 6/22/20rs      [x] Self-Management  Class 2 - Meal Plan and handout for serving sizes, smarter snacking, Ready Set Carb Counting / Plate Method, Nutrient Conversion and International Diabetes [address] Eating for People with Diabetes and Nutrition in the [employer] - fast facts about fast food7/13/20 JW    [x] Self-Management  Class 3 -  Diabetes your take control guide pages 21 - 30,  type 2 diabetes and the role of GLP- 1 _- 7/28/20rs and 7/30/20rs     [x] Self-Management Class 4 - BD Booklet  Sick Day [place] and  Dinning Out Guide , recipe hand outs and tips, diabetes Cookbooks  ( when available) -- 8/7/20rs - sent out in mail     []Self-Management - 3 month follow -  AADE7 Self care behaviors work sheets, 2020 [employer],  Online resource list - March 2020      []Self-Management  Gestational - RN class -Resource materials sent out : care booklet - \" Gestational Diabetes Mellitus ( GDM) toolkit form [place] gestational diabetes postpartum care learning collaborative 2018. \"Simple Guidelines for meal planning with gestational diabetes. SMBG sheets to fax back to M weekly. BD  healthy injection site selection and rotation with 6 mm insulin syringe and 4 mm pen needle. Gestational diabetes handout from [facility] 2016. Did you have gestational diabetes when you were pregnant? Handout from [facility]  April 2014    []Self-Management Gestational - RD class - My Food Plan for Gestational diabetes    [x]Glucose Meter 7/28/20rs--    []Insulin Kit     []Other      Encounter Type Date Start Time End Time Comments No Show Dates   Assessment 6/8/20    1 30   2 30    []In Person  [x]Telephone    Class 1 - Understanding diabetes 6/22/20rs  1 30   2 30   [x]TelephoneAmerican Diabetes Association  www. diabetes. org    Class 2- Nutrition and diabetes   7/13/20 JW 11:45 1:15 [x] in person, gf came during 2nd 1/2 of appt  Telephone  Healthy Eating with Diabetes- Automatic Data of Diabetes and Digestive and Kidney   https://[email or web address] be/[email or web address]    Class 3 - Preventing Complications 0/65/22ZE 11 35   12 10  [x]Telephone    Class 4 -  In depth Nutrition and sick day care 8/7/20RS  300   330  [x]Telephone  Diabetes Food hub  www. diabetesfoodhub.org     Class 5 - 3 month follow up / goal reassessment        Gestational - RN         Gestational - RD        Individual MNT         Shared Med Appt         Yearly Follow-up        Meter Instrx 7/30/20  300  3 45 pm   How to Measure Your Blood Sugar - [facility] Patient Education  https://[email or web address] be/[email or web address]    Insulin Instrx      []Pen  []Vial & Syringe   BD Diabetes Care: How to Inject Insulin with a Pen Needle  https://[email or web address] be/[email or web address]    Diabetes Care: How to Inject Insulin with a Syringe  https://youtu. be/[email or web address]       DSMS Support :   [] MNT      [] Annual update     [] Starting Fresh  adults living with diabetes or pre diabetes.  [address] [place], [place]    Pabrqyksj-66woex-9:30pm- on 6 week rotation Free -  REGISTRATION IS REQUIRED - CALL [phone] call for dates    []  Diabetes Group at  [place] [address] [place] - Free 6 week diabetes education support   classes - contact : Karen Zheng [phone]  for dates and locations      []ADA  Where do I Begin, Living with Type 2 diabetes ADA home support program    Web site: diabetes. org/living    Call: 1800 DIABETES  e-mail: Brent@[employer]ex[employer]. org     []  Internet web sites - ADAWeb site: www.diabetes. org       [] [facility] opens at 8 30 am daily for walkers, shops open at 10 am   [address] [facility], [facility]   [phone] Daily - 8:30am - 10am    3rd Tuesday of every month Mari Little expert speakers visit from 9 - 10am        [] [facility], [facility], [facility], [place], [facility] (site rotate)  Call [phone] or visit   website: [email or web address] for more details   Check web site for updated times/ dates       [] [address]  [address] [facility], [address] [address] Tuesday and Thursday -   9 :30 am- 10:30am - ongoing   [] [facility] [facility]  [address] [facility], [address] [facility] [address] [address] [facility] Thursday 11:00am - 11:45am     [] Third Wednesday Cooking  Class  Free  Registration is required     [address] [facility]. 1100 [facility], 125 [facility] [phone] or   Email [email or web address] "[employer]   Wednesdays-5:00- 6:00 pm       [] Eat Smart  Be Active & Learn How - Free   Families & grandparents with children in home 0- 25 & pregnant moms          Various sites in community - call to find next session near you. [facility] extension office for future sessions - Alanna Ormond  Email : [email or web address]. [employer]  Phone: [phone]     [] (SNAP-Ed)   - free nutrition education   - adults and youth, who are eligible for the Supplemental Nutrition Assistance Program    Various sites in community - call to find next session near you. [facility] PASSAVANT-CRANBERRY-OLGA LIDIA SNAP-Ed  contact Cliftoncristiano Lynne, Email:[email or web address]           Post Education Referrals:      [] [place] Tobacco Quit information sheet and [address] [facility] , [facility]       [] Dental care - Dental care of [facility]     [] [facility]) link  phone number - for information and referral to Gregory  Clinically  [address] Manuel, WEIGHT MANAGEMENT        []Other  Karen Villalobos, RN CDE

## 2020-08-12 ENCOUNTER — VIRTUAL VISIT (OUTPATIENT)
Dept: FAMILY MEDICINE CLINIC | Age: 45
End: 2020-08-12
Payer: MEDICAID

## 2020-08-12 PROCEDURE — 83036 HEMOGLOBIN GLYCOSYLATED A1C: CPT | Performed by: NURSE PRACTITIONER

## 2020-08-12 PROCEDURE — 2022F DILAT RTA XM EVC RTNOPTHY: CPT | Performed by: NURSE PRACTITIONER

## 2020-08-12 PROCEDURE — 3052F HG A1C>EQUAL 8.0%<EQUAL 9.0%: CPT | Performed by: NURSE PRACTITIONER

## 2020-08-12 PROCEDURE — 99213 OFFICE O/P EST LOW 20 MIN: CPT | Performed by: NURSE PRACTITIONER

## 2020-08-12 PROCEDURE — G8427 DOCREV CUR MEDS BY ELIG CLIN: HCPCS | Performed by: NURSE PRACTITIONER

## 2020-08-12 ASSESSMENT — ENCOUNTER SYMPTOMS
SHORTNESS OF BREATH: 0
COUGH: 0

## 2020-08-12 NOTE — PROGRESS NOTES
VISIT LOCATION: Home    TELEHEALTH EVALUATION -- Audio/Visual (During WGITQ-52 public health emergency)    Due to COVID 23 outbreak, patient's office visit was converted to a virtual visit. Patient was contacted and agreed to proceed with a virtual visit via [address] Visit  The risks and benefits of converting to a virtual visit were discussed in light of the current infectious disease epidemic. Patient also understood that insurance coverage and co-pays are up to their individual insurance plans. Adrian Pham (:  1975) has requested an audio/video evaluation for the following concern(s):    Chief Complaint:   HPI:  Dm- he is going to dm ed. He has changed everything. He has lost 12 lbs. He has cut out carbs. He is eating fruit and salads. He is liking the changes and feels good about it. He is due for an a1c . Wt Readings from Last 3 Encounters:   20 241 lb (109.3 kg)   20 243 lb (110.2 kg)   20 253 lb (114.8 kg)     Review of Systems   Constitutional: Negative for chills and fever. Respiratory: Negative for cough and shortness of breath. Cardiovascular: Negative for chest pain and leg swelling. Prior to Visit Medications    Medication Sig Taking? Authorizing Provider   Urea (CARMOL) 40 % cream Apply to feet bid Yes Mirian Enamorado DPM   Lancets MISC 1 each by Does not apply route daily Yes TRES Zepeda CNP   blood glucose monitor strips Test 1 time a day & as needed for symptoms of irregular blood glucose. Dispense sufficient amount for indicated testing frequency plus additional to accommodate PRN testing needs.  Yes TRES Zepeda CNP   atorvastatin (LIPITOR) 20 MG tablet Take 1 tablet by mouth daily Yes TRES Zepeda CNP   lisinopril-hydroCHLOROthiazide (PRINZIDE;ZESTORETIC) 10-12.5 MG per tablet take 1 tablet by mouth once daily Yes TRES Zepeda CNP   UREA 20 INTENSIVE HYDRATING 20 % cream apply to FEET twice a day Yes Girtha Mixer, DPM   ammonium lactate (LAC-HYDRIN) 12 % cream Apply topically as needed. Yes Girtha Mixer, DPM   Blood Glucose Monitoring Suppl (ACURA BLOOD GLUCOSE METER) w/Device KIT 1 Device by Does not apply route once for 1 dose  TRES Parada - CNP     Social- none    Past Medical History:   Diagnosis Date    Dry skin     GERD (gastroesophageal reflux disease)     Hematuria     HLD (hyperlipidemia) 3/2/2015    Hypertension     AIDEN (obstructive sleep apnea)     Pre-diabetes     Sleep apnea     does not use a machine    Wears dentures     upper   ,   Past Surgical History:   Procedure Laterality Date    ANKLE SURGERY Left     fracture    ARTHROPLASTY      4th and 5th toe on left foot    COLONOSCOPY  12/22/2014    normal    CYSTOSCOPY  03/21/2017    retrograde    CYSTOSCOPY Bilateral 3/21/2017    CYSTOSCOPY, RETROGRADE PYELOGRAM performed by Darling Molina MD at [place] [address]. Left 03/03/2017    left foot 5th digit derotational arthroplasty             [] OTHER:    Constitutional: [x] Appears well-developed and well-nourished [] No apparent distress                            [] Abnormal-   Mental status  [x] Alert and awake  [x] Oriented to person/place/time [x]Able to follow commands       Eyes:  EOM    [x]  Normal  [] Abnormal-  Sclera  [x]  Normal  [] Abnormal -         Discharge [x]  None visible  [] Abnormal -     HENT:   [x] Normocephalic, atraumatic.   [] Abnormal   [] Mouth/Throat: Mucous membranes are moist.      External Ears [x] Normal  [] Abnormal-      Neck: [x] No visualized mass      Pulmonary/Chest: [x] Respiratory effort normal.  [] No visualized signs of difficulty breathing or respiratory distress        [] Abnormal-      Musculoskeletal:   [] Normal gait with no signs of ataxia         [x] Normal range of motion of neck        [] Abnormal-   [] Motor grossly intact in visible upper extremities    [] Motor grossly intact in visible lower extremities        Neurological:        [x] No Facial Asymmetry (Cranial nerve 7 motor function) (limited exam to video visit)                       [x] No gaze palsy        [] Abnormal-         Skin:                     [x] No significant exanthematous lesions or discoloration noted on facial skin         [] Abnormal-                                  Psychiatric:           [x] Normal Affect [] No Hallucinations        [] Abnormal- [] Normal Mood  [] Anxious appearing    [] Depressed appearing  [] Confused appearing      Due to this being a TeleHealth encounter, evaluation of the following organ systems is limited: Vitals/Constitutional/EENT/Resp/CV/GI//MS/Neuro/Skin/Heme-Lymph-Imm. ASSESSMENT/PLAN:  Encounter Diagnosis   Name Primary?  Type 2 diabetes mellitus with diabetic polyneuropathy, without long-term current use of insulin (HCC) Yes   will stop in office next week for a1c  Happy with dietary changes. No orders of the defined types were placed in this encounter. No follow-ups on file. The time that was spent with the family/patient addressing care on this video call and chart review was 15 minutes. An  electronic signature was used to authenticate this note. --TRES Grubbs CNP on 8/12/2020 at 10:20 AM        Pursuant to the emergency declaration under the [facility], [facility] waiver authority and the [employer] and [employer] Act, this Virtual  Visit was conducted, with patient's consent, to reduce the patient's risk of exposure to COVID-19 and provide continuity of care for an established patient. Services were provided through a telephone discussion virtually to substitute for in-person clinic visit.

## 2020-08-20 LAB — HBA1C MFR BLD: 7.4 %

## 2020-10-05 RX ORDER — ATORVASTATIN CALCIUM 20 MG/1
TABLET, FILM COATED ORAL
Qty: 30 TABLET | Refills: 5 | Status: SHIPPED | OUTPATIENT
Start: 2020-10-05 | End: 2021-04-16

## 2020-10-05 NOTE — TELEPHONE ENCOUNTER
floor [facility])     Right medial orbital wall fracture ([address]     Exophthalmos of right eye     Morbidly obese (HCC)     Lumbar radiculopathy

## 2020-10-14 ENCOUNTER — OFFICE VISIT (OUTPATIENT)
Dept: PODIATRY | Age: 45
End: 2020-10-14
Payer: MEDICAID

## 2020-10-14 VITALS — HEIGHT: 66 IN | WEIGHT: 241 LBS | TEMPERATURE: 98.6 F | BODY MASS INDEX: 38.73 KG/M2 | RESPIRATION RATE: 16 BRPM

## 2020-10-14 PROCEDURE — 1036F TOBACCO NON-USER: CPT | Performed by: PODIATRIST

## 2020-10-14 PROCEDURE — G8428 CUR MEDS NOT DOCUMENT: HCPCS | Performed by: PODIATRIST

## 2020-10-14 PROCEDURE — G8417 CALC BMI ABV UP PARAM F/U: HCPCS | Performed by: PODIATRIST

## 2020-10-14 PROCEDURE — G8484 FLU IMMUNIZE NO ADMIN: HCPCS | Performed by: PODIATRIST

## 2020-10-14 PROCEDURE — 11721 DEBRIDE NAIL 6 OR MORE: CPT | Performed by: PODIATRIST

## 2020-10-14 PROCEDURE — 99213 OFFICE O/P EST LOW 20 MIN: CPT | Performed by: PODIATRIST

## 2020-10-14 PROCEDURE — 17110 DESTRUCTION B9 LES UP TO 14: CPT | Performed by: PODIATRIST

## 2020-10-14 NOTE — PROGRESS NOTES
[facility] PHYSICIANS  [facility]  [address] [place] [address]  Dept: [phone]  Dept Fax: [phone]    RETURN PATIENT PROGRESS NOTE  Date of patient's visit: 10/14/2020  Patient's Name:  John Howell YOB: 1975            Patient Care Team:  TRES Hernandez CNP as PCP - General (Certified Nurse Practitioner)  TRES Hernandez CNP as PCP - [address] [place] [address] [facility]  Iraida Herman MD as Consulting Physician (Ophthalmology)  Becca Otero DPM as Physician (Podiatry)       John Howell 54 y.o. male that presents for follow-up of lesions and thick,long nails  Chief Complaint   Patient presents with    Benign Neoplasm     Pt's primary care physician is TRES Tamayo CNP last seen 8/12/20  Symptoms began  year(s) ago and are decreased after partial excision but returned . Patient relates pain is Present. Pain is rated 5 out of 10 and is described as intermittent. Treatments prior to today's visit include: right toe stiff. Currently denies F/C/N/V. He also c/o painful elongated nails that are difficult to trim himself at home. No Known Allergies    Past Medical History:   Diagnosis Date    Dry skin     GERD (gastroesophageal reflux disease)     Hematuria     HLD (hyperlipidemia) 3/2/2015    Hypertension     AIDEN (obstructive sleep apnea)     Pre-diabetes     Sleep apnea     does not use a machine    Wears dentures     upper       Prior to Admission medications    Medication Sig Start Date End Date Taking?  Authorizing Provider   atorvastatin (LIPITOR) 20 MG tablet take 1 tablet by mouth once daily 10/5/20   TRES Hernandez CNP   Urea (CARMOL) 40 % cream Apply to feet bid 8/5/20   Becca Otero DPM   Blood Glucose Monitoring Suppl [facility] BLOOD GLUCOSE METER) w/Device KIT 1 Device by Does not apply route once for 1 dose 7/13/20 7/13/20  TRES Hernandez CNP   Lancets MISC 1 each by Bilateral. decreased medial longitudinal arch, Bilateral.  Ankle ROM decreased,Bilateral.    Dorsally contracted digits present digits 2, Bilateral.     Vascular: DP pulses 1/4 bilateral.  PT pulses 0/4 bilateral.   CFT <5 seconds, Bilateral.  Hair growth absent to the level of the digits, Bilateral.  Edema present, Bilateral.  Varicosities absent, Bilateral. Erythema absent, Bilateral    Neurological: Sensation diminshed to light touch to level of digits, Bilateral.  Protective sensation intact 6/10 sites via 5.07/10g [place]Zoe Monofilament, Bilateral.  negative Tinel's, Bilateral.  negative Valleix sign, Bilateral.      Integument: Warm, dry, supple, Bilateral.  Open lesion absent, Bilateral.  Interdigital maceration absent to web spaces 4, Bilateral.  Nails 1-5 left and 1-5 right thickened > 3.0 mm, dystrophic and crumbly, discolored with subungual debris. Fissures absent, Bilateral.       Asessment: Patient is a 54 y.o. male with:    Diagnosis Orders   1. Benign neoplasm of skin of left foot  68085 - NM DESTRUCTION BENIGN LESIONS UP TO 14   2. Difficulty walking  17564 - NM DESTRUCTION BENIGN LESIONS UP TO 14    43569 - NM DEBRIDEMENT OF NAILS, 6 OR MORE   3. Dermatophytosis of nail  26489 - NM DEBRIDEMENT OF NAILS, 6 OR MORE   4. Ingrown nail  39332 - NM DEBRIDEMENT OF NAILS, 6 OR MORE   5. Heloma molle  54102 - NM DESTRUCTION BENIGN LESIONS UP TO 14   6. Pain in both lower extremities  37265 - NM DESTRUCTION BENIGN LESIONS UP TO 14    73410 - NM DEBRIDEMENT OF NAILS, 6 OR MORE       Plan: Patient examined and evaluated. Current condition and treatment options discussed in detail. The lesion was partially excised and Pyrogallic acid was applied under occlusion. Toe spacer dispensed today. The patient will leave in place for 24-48 hours and than remove. The patient tolerated the procedure well and without complication. sharp debridement of nails 1-5 jhonny with nail nipper and drummel.  Verbal and

## 2020-11-06 ENCOUNTER — HOSPITAL ENCOUNTER (OUTPATIENT)
Dept: DIABETES SERVICES | Age: 45
Setting detail: THERAPIES SERIES
Discharge: HOME | End: 2020-11-06
Payer: MEDICAID

## 2020-11-06 PROCEDURE — G0108 DIAB MANAGE TRN  PER INDIV: HCPCS

## 2020-11-06 NOTE — PROGRESS NOTES
Diabetes Self- Management Education Program  -  This visit is a Telephone  session (During UPJZO-60 public health emergency). Pursuant to the emergency declaration under the [facility], [address] [place] [address] authority and the [employer] Act, this Telephone visit was conducted with patient's (and/or legal guardian's) consent, to reduce the patient's risk of exposure to COVID-19 and provide necessary medical care. The patient (and/or legal guardian) has also been advised to contact this office for worsening conditions or problems, and seek emergency medical treatment and/or call 911 if deemed necessary. Patient identification was verified at the start of the visit: Yes    Total time spent for this encounter:  30 min   11/6/20 , 1/2 hour 1:30-2:00pm via phone      Services were provided through via phone discussion to substitute for in-person clinic visit. Patient and provider were located at their individual home/office. MEDICAL HISTORY:  Past Medical History:   Diagnosis Date    Dry skin     GERD (gastroesophageal reflux disease)     Hematuria     HLD (hyperlipidemia) 3/2/2015    Hypertension     AIDEN (obstructive sleep apnea)     Pre-diabetes     Sleep apnea     does not use a machine    Wears dentures     upper     Family History   Problem Relation Age of Onset    High Blood Pressure Mother     Diabetes Father      Patient has no known allergies.    Immunization History   Administered Date(s) Administered    Tdap (Boostrix, Adacel) 12/16/2017     Current Medications  Current Outpatient Medications   Medication Sig Dispense Refill    atorvastatin (LIPITOR) 20 MG tablet take 1 tablet by mouth once daily 30 tablet 5    Urea (CARMOL) 40 % cream Apply to feet bid 1 Tube 3    Blood Glucose Monitoring Suppl (ACURA BLOOD GLUCOSE METER) w/Device KIT 1 Device by Does not apply route once for 1 dose 1 kit 0    Lancets MISC 1 each by Does not apply route daily 300 each 1    blood glucose monitor strips Test 1 time a day & as needed for symptoms of irregular blood glucose. Dispense sufficient amount for indicated testing frequency plus additional to accommodate PRN testing needs. 300 strip 1    lisinopril-hydroCHLOROthiazide (PRINZIDE;ZESTORETIC) 10-12.5 MG per tablet take 1 tablet by mouth once daily 30 tablet 5    UREA 20 INTENSIVE HYDRATING 20 % cream apply to FEET twice a day 85 g 3    ammonium lactate (LAC-HYDRIN) 12 % cream Apply topically as needed. 1 Bottle 4     No current facility-administered medications for this encounter.    :     Comments:  Allergies:  No Known Allergies    A1C blood level   Lab Results   Component Value Date    LABA1C 7.4 08/20/2020    LABA1C 8.8 05/22/2020    LABA1C 8.1 02/24/2020     Lab Results   Component Value Date    CREATININE 0.81 06/03/2020       Blood pressure   BP Readings from Last 3 Encounters:   05/22/20 110/72   02/24/20 124/78   01/28/20 131/83        Cholesterol  Lab Results   Component Value Date    LDLCHOLESTEROL 102 06/03/2020        Diabetes Self- Management Education Record    Participant Name: Andrea Live  Referring Provider: TRES Marshall CNP   Assessment/Evaluation Ratings:  1=Needs Instruction   4=Demonstrates Understanding/Competency  2=Needs Review   NC=Not Covered    3=Comprehends Key Points  N/A=Not Applicable  Topics/Learning Objectives Pre-session Assess Date:  6/8/20rs Instr. Date Reinforce Date Post- session Eval  11/6/20 JW Comments   Diabetes disease process & Treatment process: Define diabetes & pre-diabetes; Identify own type of diabetes; role of the pancreas; signs/symptoms; diagnostic criteria; prevention & treatment options; contributing factors.  1 6/22/20rs  3 Wt up 30 lbs in last 3 months - change with eating and activity with covid - 19     7/30/20inoffice today and wt is down 10 lbs ar 243 lbs   - now down 241lbs - 8/7/20RS lifestyle changes. -7/28/20- discussed metformin as an option    Insulin / Injectable - Appropriate injection sites; proper storage; supplies needed; proper technique; safe needle disposal guidelines. 1 n/a      Monitoring blood glucose, interpreting and using results:  Identify recommended & personal blood glucose targets; importance of testing; testing supplies; HgbA1C target levels; Factors affecting blood glucose; Importance of logging blood glucose levels for pattern recognition; ketone testing; safe lancet disposal.   1 6/22/20rs    7/28/20rs  3 Only follows A1C - per chart review - A1C of 6.6 3/2017 and more recent   Lab Results   Component Value Date    LABA1C 7.4 08/20/2020    LABA1C 8.8 05/22/2020    LABA1C 8.1 02/24/2020 7/13/20 JW discussed smbg, pt interested, note to MD- Rx sent to pharmacy. Pt to try or bring to RN appt. Getting a one touch meter w insurance. 7/28/20rs -now has new BG meter - helped patient over phone to set up and start to use the meter - one verio flex - the meter is on back order per backorder - will use sample meter from [address] [place] [address] for patient     7/30/20 - set up and patient used one touch verio meter  -  Patient RX for strips are verio strips and work with the meter. provided universal lancet device - as pt had true metrix 33 marcelina lancet dispensed from pharmacy and the one touch delica device will not work with the true Metrix lancets. With coaching Ever Maloneyeker was able to sary his finger tip and apply blood to strip in the device - BG was 103 - explained the one touch debra and also provided a log book for recording ,Made plan for start with am / fasting BG checks, then add in 2 hour post meal BG checks - keep log and follow up in one week with CDE on phone call visit.   Next A1c reviewed to be due by end of Aug - mireya to call NP for appt or lab slip    8/7/20 rs - patient stated BG in 110 - 120 range when he checks - some fasting and some random - reminded to make appt with PCP soon for follow up A1 C11/6/20 JW 7.4 % (down 1.4%)     Prevention, detection & treatment of acute complications:  Identify symptoms of hyper & hypoglycemia, and prevention & treatment strategies. 1 6/22/20rs  3  symptoms - none-    IF for higher BG - bit more more blurry vision   Describe sick day guidelines & indications for  physician notification. Identify short term consequences of poor control. Disaster preparedness strategies    1 8/7/20rs  3 - .8/7/20RS - discussed Covid 19 and diabetes - advised to  to check temp at home and self monitor for s/s of illness - encouraged patient to get a flu shot   Prevention, detection & treatment of chronic complications:  Define the natural course of diabetes & describe the relationship of blood glucose levels to long term complications of diabetes. Identify preventative measures & standards of care. 1 7/28/20rs  3 Foot MD - visit 3 months - following up - next visit on 8/5/2020    PCP - checks lab - started on cholesterol med 5/2020    EYE - care not recently - plan to do soon  - vision is better - now 7/28/20rs    Dental - plan to see on Wed - saw dentist       -had foot MD check      Developing strategies to address psychosocial issues:  Describe feelings about living with diabetes; Describe how stress, depression & anxiety affect blood glucose; Identify coping strategies; Identify support needed & support network available. 1 6/22/20rs  3 Has good support system- SO helps and also in board with we loss plans   Developing strategies to promote health/change behavior: Identify 7 self-care behaviors; Personal health risk factors; Benefits, challenges & strategies for behavioral change;    1 7/28/20rs  3   7/28/20rs- working on starting to check BG and eating healthy food with support of his GF    Individualized goal selection.   Goal is weight loss - desire is 30 lbs  Baseline wt is 253lbs ( 6/8/20) wt: 241# (11/6/20) wt down 12#             3  My goal , to help me improve my health, I will:   1. Add in Bk daily - like - eggs,  graf and toast 75%11/6/20 JW    2. Stay off /  Stop eating  skittles and sweets and regular pop 75%11/6/20 JW       Plan  Follow-up Appointments planned - in 12weeks - phone call followup    Instruction Method: [x]Lecture/Discussion  []Power Point Presentation  [x]Handouts  []Return Demonstration    Education Materials/Equipment Provided (VIA Mail for phone visits)  :    [x]Self-Management - Initial assessment - Enrolment in to ADA  Where do I Begin, Living with Type 2 diabetes ADA home support program and  handout on diabetes education classes. [x]Self-Management  Class 1 -Self-Management  Class 1 - \"Diabetes - your take control guide\" - ADA booklet -  o  \"ready, set, start counting\" teaching sheet in diet section   o  GLP-1 understanding 8 core deficits puzzle sheet in the medication section  o one day food diary and envelope for return of diet HX   o  You tube and website resource sheet-Understanding Type 2 Diabetes from Animated Diabetes Patient https://[email or web address] be/KMyXa75dOKI -- 6/22/20rs      [x] Self-Management  Class 2 - Meal Plan and handout for serving sizes, smarter snacking, Ready Set Carb Counting / Plate Method, Nutrient Conversion and International Diabetes [address] Eating for People with Diabetes and Nutrition in the [employer] - fast facts about fast food7/13/20 JW    [x] Self-Management  Class 3 -  Diabetes your take control guide pages 21 - 30,  type 2 diabetes and the role of GLP- 1 _- 7/28/20rs and 7/30/20rs     [x] Self-Management Class 4 - BD Booklet  Sick Day [place] and  Dinning Out Guide , recipe hand outs and tips, diabetes Cookbooks  ( when available) -- 8/7/20rs - sent out in mail     [x]Self-Management - 3 month follow -  AADE7 Self care behaviors work sheets, 2020 [employer],  Online resource list - March 2020 11/6/20 JW    []Self-Management  Gestational - RN class -Resource materials sent out : care booklet - \" Gestational Diabetes Mellitus ( GDM) toolkit form [place] gestational diabetes postpartum care learning collaborative 2018. \"Simple Guidelines for meal planning with gestational diabetes. SMBG sheets to fax back to MFM weekly. BD  healthy injection site selection and rotation with 6 mm insulin syringe and 4 mm pen needle. Gestational diabetes handout from [facility] 2016. Did you have gestational diabetes when you were pregnant? Handout from [facility]  April 2014    []Self-Management Gestational - RD class - My Food Plan for Gestational diabetes    [x]Glucose Meter 7/28/20rs--    []Insulin Kit     []Other      Encounter Type Date Start Time End Time Comments No Show Dates   Assessment 6/8/20    1 30   2 30    []In Person  [x]Telephone    Class 1 - Understanding diabetes 6/22/20rs  1 30   2 30   [x]TelephoneAmerican Diabetes Association  www. diabetes. org    Class 2- Nutrition and diabetes   7/13/20 JW 11:45 1:15 [x] in person, gf came during 2nd 1/2 of appt  Telephone  Healthy Eating with Diabetes- Automatic Data of Diabetes and Digestive and Kidney   https://youtu. be/[email or web address]    Class 3 - Preventing Complications 5/35/95RK 11 35   12 10  [x]Telephone    Class 4 -  In depth Nutrition and sick day care 8/7/20RS  300   330  [x]Telephone  Diabetes Food hub  www. diabetesfoodhub.org     Class 5 - 3 month follow up / goal reassessment 11/6/20 JW 1:40 2:20 Telephone. Doing well wt down 12# and A1c down 1.4%    Gestational - RN         Gestational - RD        Individual MNT         Shared Med Appt         Yearly Follow-up        Meter Instrx 7/30/20  300  3 45 pm   How to Measure Your [address] Patient Education  https://youtu. be/[email or web address]    Insulin Instrx      []Pen  []Vial & Syringe   BD Diabetes Care: How to Inject Insulin with a Pen Needle  https://youtu. be/[email or web address]    Diabetes Care:  How to Inject Insulin with a Families & grandparents with children in home 0- 25 & pregnant moms          Various sites in community - call to find next session near you. [facility] extension office for future sessions - Arianne Kaiser  Email : [email or web address]  Phone: [phone]     [] (SNAP-Ed)   - free nutrition education   - adults and youth, who are eligible for the Supplemental Nutrition Assistance Program    Various sites in community - call to find next session near you. [address] SNAP-Ed  contact Geeta James, Email:[email or web address]           Post Education Referrals:      [] [place] Tobacco Quit information sheet and [address] , [address]       [] Dental care - [facility]     [] [facility] ([facility]) link  phone number - for information and referral to [address], WOUND, WEIGHT MANAGEMENT        []Other  Kylee Gross, RD, LD CDE

## 2020-11-06 NOTE — LETTER
[facility]  [facility] [address]  [facility] [address]  Phone: [phone]         November 6, 2020    To: TRES Linares CNP    From: Angeles Alegre RD, LD    Patient: Ervin Boas   YOB: 1975   Date of Visit: 11/6/20      The following content has been reviewed since the last assessment: Managing Diabetes (Carb counting, monitoring, medications, physical activity)  Balancing Diabetes (Meal planning, using BG results, identifying BG targets)  Reducing Risks   Praised pt progress of 12# wt loss and 1.4% reduction in HgbA1c  An ongoing plan was created to include:Post Program Support (see below)    Post Program Self Management Support Plans include:  Diabetes Care Appointments with MD, Community Programs/Support Groups, Annual Review, Kristen Diabetes Newsletters/Magazines and Internet Resources    Thank you for the opportunity to provide Diabetes Self Management Education to your patient.

## 2020-12-09 RX ORDER — LISINOPRIL AND HYDROCHLOROTHIAZIDE 12.5; 1 MG/1; MG/1
TABLET ORAL
Qty: 30 TABLET | Refills: 1 | Status: SHIPPED | OUTPATIENT
Start: 2020-12-09 | End: 2021-02-10

## 2020-12-09 NOTE — TELEPHONE ENCOUNTER
Last visit: 8/12/2020  Last Med refill: 11/07/2020  Does patient have enough medication for 72 hours: No:     Next Visit Date:  UNABLE TO LMOVM TO SCHEDULE APPT DUE TO NO MAIL BOX SET UP  Future Appointments   Date Time Provider Carrillo Still   12/21/2020  1:45 PM AAKASH [facility] [address] Maintenance   Topic Date Due    Diabetic retinal exam  09/11/1984    Hepatitis B vaccine (1 of 3 - Risk 3-dose series) 09/11/1993    Diabetic foot exam  08/22/2020    Flu vaccine (1) 09/01/2020    Diabetic microalbuminuria test  05/22/2021    Lipid screen  06/03/2021    Potassium monitoring  06/03/2021    Creatinine monitoring  06/03/2021    A1C test (Diabetic or Prediabetic)  08/20/2021    DTaP/Tdap/Td vaccine (2 - Td) 12/16/2027    HIV screen  Completed    Hepatitis A vaccine  Aged Out    Hib vaccine  Aged Out    Meningococcal (ACWY) vaccine  Aged Out    Pneumococcal 0-64 years Vaccine  Aged Out       Hemoglobin A1C (%)   Date Value   08/20/2020 7.4   05/22/2020 8.8   02/24/2020 8.1             ( goal A1C is < 7)   No results found for: LABMICR  LDL Cholesterol (mg/dL)   Date Value   06/03/2020 102   02/26/2019 104       (goal LDL is <100)   AST (U/L)   Date Value   06/03/2020 31     ALT (U/L)   Date Value   06/03/2020 34     BUN (mg/dL)   Date Value   06/03/2020 11     BP Readings from Last 3 Encounters:   05/22/20 110/72   02/24/20 124/78   01/28/20 131/83          (goal 120/80)    All Future Testing planned in [facility]  Lab Frequency Next Occurrence   PT eval and treat Once 05/14/2020   PT aquatic therapy Once 05/14/2020   Lumbar Epidural Steroid Injection/Caudal Once 11/14/2020   Saline lock IV Once 11/14/2020   Fluoro For Surgical Procedures Once 11/14/2020               Patient Active Problem List:     Hypertension     Rectal bleeding     GERD (gastroesophageal reflux disease)     Dyslipidemia     Lumbar strain     Hematuria     Fracture of right orbital floor ([address]     Right medial orbital wall fracture (HCC)     Exophthalmos of right eye     Morbidly obese (HCC)     Lumbar radiculopathy

## 2020-12-21 ENCOUNTER — OFFICE VISIT (OUTPATIENT)
Dept: PODIATRY | Age: 45
End: 2020-12-21
Payer: MEDICAID

## 2020-12-21 VITALS — BODY MASS INDEX: 38.73 KG/M2 | TEMPERATURE: 98.4 F | WEIGHT: 241 LBS | RESPIRATION RATE: 16 BRPM | HEIGHT: 66 IN

## 2020-12-21 PROCEDURE — G8484 FLU IMMUNIZE NO ADMIN: HCPCS | Performed by: PODIATRIST

## 2020-12-21 PROCEDURE — 1036F TOBACCO NON-USER: CPT | Performed by: PODIATRIST

## 2020-12-21 PROCEDURE — G8417 CALC BMI ABV UP PARAM F/U: HCPCS | Performed by: PODIATRIST

## 2020-12-21 PROCEDURE — 11721 DEBRIDE NAIL 6 OR MORE: CPT | Performed by: PODIATRIST

## 2020-12-21 PROCEDURE — 17110 DESTRUCTION B9 LES UP TO 14: CPT | Performed by: PODIATRIST

## 2020-12-21 PROCEDURE — G8428 CUR MEDS NOT DOCUMENT: HCPCS | Performed by: PODIATRIST

## 2020-12-21 PROCEDURE — 99213 OFFICE O/P EST LOW 20 MIN: CPT | Performed by: PODIATRIST

## 2020-12-21 RX ORDER — NYSTATIN 100000 [USP'U]/G
POWDER TOPICAL
Qty: 1 BOTTLE | Refills: 2 | Status: SHIPPED | OUTPATIENT
Start: 2020-12-21

## 2020-12-21 NOTE — PROGRESS NOTES
[facility] PHYSICIANS  [facility]  [address] [place] [address]  Dept: [phone]  Dept Fax: [phone]     FOOT PAIN & BENIGN NEOPLASM PROGRESS NOTE  Date of patient's visit: 12/21/2020  Patient's Name:  Latanya Dobson YOB: 1975            Patient Care Team:  TRES Sparrow CNP as PCP - General (Certified Nurse Practitioner)  TRES Sparrow CNP as PCP - [facility] Empaneled Provider  Lazara Villarreal MD as Consulting Physician (Ophthalmology)  Christos Hoskins DPM as Physician (Podiatry)          Chief Complaint   Patient presents with    Foot Pain    Benign Neoplasm       Subjective: This Latanya Dobson comes to clinic for foot and nail care. Pt currently has complaint of thickened, painful, elongated nails that he/she cannot manage by themselves. Pt. Relates pain to nails with shoe gear. Pt's primary care physician is Som Saeed seen  12/9/20. He also c/o painful skin lesions to left foot bottom and in the webspace      No Known Allergies  Current Outpatient Medications on File Prior to Visit   Medication Sig Dispense Refill    lisinopril-hydroCHLOROthiazide (PRINZIDE;ZESTORETIC) 10-12.5 MG per tablet take 1 tablet by mouth once daily 30 tablet 1    atorvastatin (LIPITOR) 20 MG tablet take 1 tablet by mouth once daily 30 tablet 5    Urea (CARMOL) 40 % cream Apply to feet bid 1 Tube 3    Blood Glucose Monitoring Suppl (ACURA BLOOD GLUCOSE METER) w/Device KIT 1 Device by Does not apply route once for 1 dose 1 kit 0    Lancets MISC 1 each by Does not apply route daily 300 each 1    blood glucose monitor strips Test 1 time a day & as needed for symptoms of irregular blood glucose. Dispense sufficient amount for indicated testing frequency plus additional to accommodate PRN testing needs.  300 strip 1    UREA 20 INTENSIVE HYDRATING 20 % cream apply to FEET twice a day 85 g 3  ammonium lactate (LAC-HYDRIN) 12 % cream Apply topically as needed. 1 Bottle 4     No current facility-administered medications on file prior to visit. Review of Systems    Review of Systems:   History obtained from chart review and the patient  General ROS: negative for - chills, fatigue, fever, night sweats or weight gain  Constitutional: Negative for chills, diaphoresis, fatigue, fever and unexpected weight change. Musculoskeletal: Positive for arthralgias, gait problem and joint swelling. Neurological ROS: negative for - behavioral changes, confusion, headaches or seizures. Negative for weakness and numbness. Dermatological ROS: negative for - mole changes, rash  Cardiovascular: Negative for leg swelling. Gastrointestinal: Negative for constipation, diarrhea, nausea and vomiting. Objective:  Dermatologic Exam:  Soft tissue lesion to the plantar right and left foot and 4th webspace with central core and petechiae. Pain on palpation of lesion. Skin No rashes or nodules noted. .   Skin is thin, with flaky sloughing skin as well as decreased hair growth to the lower leg  Small red hemosiderin deposits seen dorsal foot   Musculoskeletal:     1st MPJ ROM decreased, Bilateral.  Muscle strength 5/5, Bilateral.  Pain present upon palpation of toenails 1-5, Bilateral. decreased medial longitudinal arch, Bilateral.  Ankle ROM decreased,Bilateral.    Dorsally contracted digits present digits 2, Bilateral.     Vascular: DP pulses 1/4 bilateral.  PT pulses 0/4 bilateral.   CFT <5 seconds, Bilateral.  Hair growth absent to the level of the digits, Bilateral.  Edema present, Bilateral.  Varicosities absent, Bilateral. Erythema absent, Bilateral    Neurological: Sensation diminshed to light touch to level of digits, Bilateral.  Protective sensation intact 6/10 sites via 5.07/10g Ainsworth-Zoe Monofilament, Bilateral.  negative Tinel's, Bilateral.  negative Valleix sign, Bilateral. Integument: Warm, dry, supple, Bilateral.  Open lesion absent, Bilateral.  Interdigital maceration absent to web spaces 4, Bilateral.  Nails 1-5 left and 1-5 right thickened > 3.0 mm, dystrophic and crumbly, discolored with subungual debris. Fissures absent, Bilateral.   General: AAO x 3 in NAD. Derm  Toenail Description  Sites of Onychomycosis Involvement (Check affected area)  [x] [x] [x] [x] [x] [x] [x] [x] [x] [x]  5 4 3 2 1 1 2 3 4 5                          Right                                        Left    Thickness  [x] [x] [x] [x] [x] [x] [x] [x] [x] [x]  5 4 3 2 1 1 2 3 4 5                         Right                                        Left    Dystrophic Changes   [x] [x] [x] [x] [x] [x] [x] [x] [x] [x]  5 4 3 2 1 1 2 3 4 5                         Right                                        Left    Color  [x] [x] [x] [x] [x] [x] [x] [x] [x] [x]  5 4 3 2 1 1 2 3 4 5                          Right                                        Left    Incurvation/Ingrowin   [] [] [] [] [] [] [] [] [] []  5 4 3 2 1 1 2 3 4 5                         Right                                        Left    Inflammation/Pain   [x] [x] [x] [x] [x] [x] [x] [x] [x] [x]  5 4 3  2 1 1 2 3 4 5                         Right                                        Left       Nails are painful 1-10 with direct palpation. Q7   []Yes  []No                Q8   [x]Yes  []No                     Q9   []Yes    []No    Assessment:  54 y.o. male with:    Diagnosis Orders   1. Benign neoplasm of skin of left foot  63856 - CO DESTRUCTION BENIGN LESIONS UP TO 14   2. Difficulty walking  98388 - CO DESTRUCTION BENIGN LESIONS UP TO 14    20773 - CO DEBRIDEMENT OF NAILS, 6 OR MORE   3. Dermatophytosis of nail  24644 - CO DEBRIDEMENT OF NAILS, 6 OR MORE   4. Xerosis cutis     5. Pain in both lower extremities  69294 - CO DESTRUCTION BENIGN LESIONS UP TO 14    99456 - CO DEBRIDEMENT OF NAILS, 6 OR MORE   6.  [place] 7. Tinea pedis of both feet  nystatin (MYCOSTATIN) 809129 UNIT/GM powder         Plan:   Pt was evaluated and examined. Patient was given personalized discharge instructions. Nails 1-10 were debrided in length and thickness sharply with a nail nipper and  without incident. Diagnosis was discussed with the pt and all of their questions were answered in detail. Proper foot hygiene and care was discussed with the pt. Patient to check feet daily and contact the office with any questions/problems/concerns. Other comorbidity noted and will be managed by PCP. Pain waiver discussed with patient and confirmed. 12/21/2020    The lesion was partially debrided and silver nitrate was applied with an apperature pad under occlusion. The patient will leave in place for 24-48 hours and than remove. The patient tolerated the procedure well and without complication. Pt will follow up in 9 weeks     To address xerosis, patient to apply lachydrin cream to feet daily. Pt to monitor for fissures due to dryness. Advised pt to contact office is there are any open lesions.     Electronically signed by Lesley Irizarry DPM on 12/21/2020 at 1:59 PM  12/21/2020

## 2021-01-05 ENCOUNTER — OFFICE VISIT (OUTPATIENT)
Dept: FAMILY MEDICINE CLINIC | Age: 46
End: 2021-01-05
Payer: MEDICAID

## 2021-01-05 VITALS
BODY MASS INDEX: 38.09 KG/M2 | OXYGEN SATURATION: 98 % | SYSTOLIC BLOOD PRESSURE: 110 MMHG | HEART RATE: 80 BPM | TEMPERATURE: 98.9 F | WEIGHT: 236 LBS | DIASTOLIC BLOOD PRESSURE: 78 MMHG

## 2021-01-05 DIAGNOSIS — H10.31 ACUTE CONJUNCTIVITIS OF RIGHT EYE, UNSPECIFIED ACUTE CONJUNCTIVITIS TYPE: ICD-10-CM

## 2021-01-05 DIAGNOSIS — H00.011 HORDEOLUM EXTERNUM OF RIGHT UPPER EYELID: Primary | ICD-10-CM

## 2021-01-05 PROCEDURE — G8417 CALC BMI ABV UP PARAM F/U: HCPCS | Performed by: INTERNAL MEDICINE

## 2021-01-05 PROCEDURE — G8484 FLU IMMUNIZE NO ADMIN: HCPCS | Performed by: INTERNAL MEDICINE

## 2021-01-05 PROCEDURE — G8427 DOCREV CUR MEDS BY ELIG CLIN: HCPCS | Performed by: INTERNAL MEDICINE

## 2021-01-05 PROCEDURE — 99213 OFFICE O/P EST LOW 20 MIN: CPT | Performed by: INTERNAL MEDICINE

## 2021-01-05 PROCEDURE — 1036F TOBACCO NON-USER: CPT | Performed by: INTERNAL MEDICINE

## 2021-01-05 RX ORDER — POLYMYXIN B SULFATE AND TRIMETHOPRIM 1; 10000 MG/ML; [USP'U]/ML
1 SOLUTION OPHTHALMIC EVERY 4 HOURS
Qty: 1 BOTTLE | Refills: 0 | Status: SHIPPED | OUTPATIENT
Start: 2021-01-05 | End: 2021-01-15

## 2021-01-05 SDOH — ECONOMIC STABILITY: TRANSPORTATION INSECURITY
IN THE PAST 12 MONTHS, HAS THE LACK OF TRANSPORTATION KEPT YOU FROM MEDICAL APPOINTMENTS OR FROM GETTING MEDICATIONS?: NO

## 2021-01-05 SDOH — ECONOMIC STABILITY: FOOD INSECURITY: WITHIN THE PAST 12 MONTHS, THE FOOD YOU BOUGHT JUST DIDN'T LAST AND YOU DIDN'T HAVE MONEY TO GET MORE.: NEVER TRUE

## 2021-01-05 SDOH — ECONOMIC STABILITY: FOOD INSECURITY: WITHIN THE PAST 12 MONTHS, YOU WORRIED THAT YOUR FOOD WOULD RUN OUT BEFORE YOU GOT MONEY TO BUY MORE.: NEVER TRUE

## 2021-01-05 ASSESSMENT — ENCOUNTER SYMPTOMS
EYE REDNESS: 1
VOMITING: 0
PHOTOPHOBIA: 0
NAUSEA: 0
DOUBLE VISION: 0
EYE ITCHING: 0
BLURRED VISION: 0
FOREIGN BODY SENSATION: 1
EYE DISCHARGE: 0

## 2021-01-05 ASSESSMENT — PATIENT HEALTH QUESTIONNAIRE - PHQ9
SUM OF ALL RESPONSES TO PHQ9 QUESTIONS 1 & 2: 0
2. FEELING DOWN, DEPRESSED OR HOPELESS: 0

## 2021-01-05 ASSESSMENT — VISUAL ACUITY: OU: 1

## 2021-01-05 NOTE — PROGRESS NOTES
Subjective:       Patient ID:     Anuradha Cho is a 54 y.o. male who presents for   Chief Complaint   Patient presents with    Stye     RT eye for 2 weeks       HPI:  Nursing note reviewed and discussed with patient. Eye Problem   The right eye is affected. This is a new problem. The current episode started 1 to 4 weeks ago. The problem occurs constantly. The problem has been waxing and waning. There was no injury mechanism. The patient is experiencing no pain. There is no known exposure to pink eye. He does not wear contacts. Associated symptoms include eye redness and a foreign body sensation. Pertinent negatives include no blurred vision, eye discharge, double vision, fever, itching, nausea, photophobia, recent URI or vomiting. He has tried nothing for the symptoms. Patient's medications, allergies, past medical, surgical, social and family histories were reviewed and updated as appropriate. Past Medical History:   Diagnosis Date    Dry skin     GERD (gastroesophageal reflux disease)     Hematuria     HLD (hyperlipidemia) 3/2/2015    Hypertension     AIDEN (obstructive sleep apnea)     Pre-diabetes     Sleep apnea     does not use a machine    Wears dentures     upper     Past Surgical History:   Procedure Laterality Date    ANKLE SURGERY Left     fracture    ARTHROPLASTY      4th and 5th toe on left foot    COLONOSCOPY  2014    normal    CYSTOSCOPY  2017    retrograde    CYSTOSCOPY Bilateral 3/21/2017    CYSTOSCOPY, RETROGRADE PYELOGRAM performed by Virlinda Burkitt, MD at [facility] Left 2017    left foot 5th digit derotational arthroplasty       Social History     Tobacco Use    Smoking status: Former Smoker     Types: Cigars     Quit date: 2019     Years since quittin.0    Smokeless tobacco: Never Used    Tobacco comment: occassionally   Substance Use Topics    Alcohol use:  Yes     Alcohol/week: 4.2 standard drinks Types: 5 Standard drinks or equivalent per week     Comment: weekends/ 6 drinks      Patient Active Problem List   Diagnosis    Hypertension    Rectal bleeding    GERD (gastroesophageal reflux disease)    Dyslipidemia    Lumbar strain    Hematuria    Fracture of right orbital floor ([address]    Right medial orbital wall fracture (HCC)    Exophthalmos of right eye    Morbidly obese (HCC)    Lumbar radiculopathy         Prior to Visit Medications    Medication Sig Taking? Authorizing Provider   nystatin (MYCOSTATIN) 930174 UNIT/GM powder Apply 2 times daily. Yes Alea Quintero DPM   lisinopril-hydroCHLOROthiazide (PRINZIDE;ZESTORETIC) 10-12.5 MG per tablet take 1 tablet by mouth once daily Yes TRES Deng CNP   atorvastatin (LIPITOR) 20 MG tablet take 1 tablet by mouth once daily Yes TRES Deng CNP   Urea (CARMOL) 40 % cream Apply to feet bid Yes Alea Quintero DPM   Blood Glucose Monitoring Suppl (ACURA BLOOD GLUCOSE METER) w/Device KIT 1 Device by Does not apply route once for 1 dose Yes TRES Deng CNP   Lancets MISC 1 each by Does not apply route daily Yes TRES Deng CNP   blood glucose monitor strips Test 1 time a day & as needed for symptoms of irregular blood glucose. Dispense sufficient amount for indicated testing frequency plus additional to accommodate PRN testing needs. Yes TRES Deng CNP   UREA 20 INTENSIVE HYDRATING 20 % cream apply to FEET twice a day Yes Alea Quintero DPM     Review of Systems  Review of Systems   Constitutional: Negative for fever. Eyes: Positive for redness. Negative for blurred vision, double vision, photophobia, discharge and itching. Gastrointestinal: Negative for nausea and vomiting. All other systems reviewed and are negative.          Objective:       Physical Exam: /78 (Site: Left Upper Arm, Position: Sitting, Cuff Size: Large Adult)   Pulse 80   Temp 98.9 °F (37.2 °C) (Temporal)   Wt 236 lb (107 kg)   SpO2 98%   BMI 38.09 kg/m²   Physical Exam  Vitals signs and nursing note reviewed. Constitutional:       Appearance: He is obese. Eyes:      General: Lids are normal. Lids are everted, no foreign bodies appreciated. Vision grossly intact. Gaze aligned appropriately. No allergic shiner, visual field deficit or scleral icterus. Conjunctiva/sclera:      Right eye: Right conjunctiva is injected. Comments: hordoleum right upper eyelid    Neurological:      Mental Status: He is alert. Data Review  not applicable       Assessment/Plan:      1. Hordeolum externum of right upper eyelid  Care handout provided       2. Acute conjunctivitis of right eye, unspecified acute conjunctivitis type  - trimethoprim-polymyxin b (POLYTRIM) 02708-0.1 UNIT/ML-% ophthalmic solution; Place 1 drop into the right eye every 4 hours for 10 days  Dispense: 1 Bottle;  Refill: 0           Health Maintenance Due   Topic Date Due    Hepatitis C screen  1975    Pneumococcal 0-64 years Vaccine (1 of 1 - PPSV23) 09/11/1980    Diabetic retinal exam  09/11/1984    Hepatitis B vaccine (1 of 3 - Risk 3-dose series) 09/11/1993    Diabetic foot exam  08/22/2020    Flu vaccine (1) 09/01/2020       Electronically signed by Francisco Eden MD on 1/5/2021 at 4:20 PM

## 2021-02-10 DIAGNOSIS — Z76.0 MEDICATION REFILL: ICD-10-CM

## 2021-02-10 RX ORDER — LISINOPRIL AND HYDROCHLOROTHIAZIDE 12.5; 1 MG/1; MG/1
TABLET ORAL
Qty: 90 TABLET | Refills: 1 | Status: SHIPPED | OUTPATIENT
Start: 2021-02-10 | End: 2021-08-25

## 2021-02-10 NOTE — TELEPHONE ENCOUNTER
Last visit: 1/5/21  Last Med refill: 12/9/20  Does patient have enough medication for 72 hours: No:     Next Visit Date:  Future Appointments   Date Time Provider Carrillo Still   2/15/2021  1:15 PM Sol Charles, [address] Maintenance   Topic Date Due    Hepatitis C screen  1975    Pneumococcal 0-64 years Vaccine (1 of 1 - PPSV23) 09/11/1980    Diabetic retinal exam  09/11/1984    Hepatitis B vaccine (1 of 3 - Risk 3-dose series) 09/11/1993    Diabetic foot exam  08/22/2020    Flu vaccine (1) 09/01/2020    Diabetic microalbuminuria test  05/22/2021    Lipid screen  06/03/2021    Potassium monitoring  06/03/2021    Creatinine monitoring  06/03/2021    A1C test (Diabetic or Prediabetic)  08/20/2021    DTaP/Tdap/Td vaccine (2 - Td) 12/16/2027    HIV screen  Completed    Hepatitis A vaccine  Aged Out    Hib vaccine  Aged Out    Meningococcal (ACWY) vaccine  Aged Out       Hemoglobin A1C (%)   Date Value   08/20/2020 7.4   05/22/2020 8.8   02/24/2020 8.1             ( goal A1C is < 7)   No results found for: LABMICR  LDL Cholesterol (mg/dL)   Date Value   06/03/2020 102   02/26/2019 104       (goal LDL is <100)   AST (U/L)   Date Value   06/03/2020 31     ALT (U/L)   Date Value   06/03/2020 34     BUN (mg/dL)   Date Value   06/03/2020 11     BP Readings from Last 3 Encounters:   01/05/21 110/78   05/22/20 110/72   02/24/20 124/78          (goal 120/80)    All Future Testing planned in [facility]  Lab Frequency Next Occurrence   PT eval and treat Once 05/14/2020   PT aquatic therapy Once 05/14/2020   Lumbar Epidural Steroid Injection/Caudal Once 11/14/2020   [place] lock IV Once 11/14/2020   Fluoro For Surgical Procedures Once 11/14/2020               Patient Active Problem List:     Hypertension     Rectal bleeding     GERD (gastroesophageal reflux disease)     Dyslipidemia     Lumbar strain     Hematuria     Fracture of right orbital floor ([address]     Right medial orbital wall fracture (HCC)     Exophthalmos of right eye     Morbidly obese (HCC)     Lumbar radiculopathy

## 2021-02-15 ENCOUNTER — OFFICE VISIT (OUTPATIENT)
Dept: PODIATRY | Age: 46
End: 2021-02-15
Payer: MEDICAID

## 2021-02-15 VITALS — HEIGHT: 66 IN | WEIGHT: 236 LBS | BODY MASS INDEX: 37.93 KG/M2 | RESPIRATION RATE: 16 BRPM | TEMPERATURE: 98 F

## 2021-02-15 DIAGNOSIS — B35.1 DERMATOPHYTOSIS OF NAIL: ICD-10-CM

## 2021-02-15 DIAGNOSIS — R26.2 DIFFICULTY WALKING: ICD-10-CM

## 2021-02-15 DIAGNOSIS — M79.604 PAIN IN BOTH LOWER EXTREMITIES: ICD-10-CM

## 2021-02-15 DIAGNOSIS — L60.0 INGROWN NAIL: ICD-10-CM

## 2021-02-15 DIAGNOSIS — M79.605 PAIN IN BOTH LOWER EXTREMITIES: ICD-10-CM

## 2021-02-15 DIAGNOSIS — D23.72 BENIGN NEOPLASM OF SKIN OF LEFT FOOT: Primary | ICD-10-CM

## 2021-02-15 PROCEDURE — G8417 CALC BMI ABV UP PARAM F/U: HCPCS | Performed by: PODIATRIST

## 2021-02-15 PROCEDURE — G8428 CUR MEDS NOT DOCUMENT: HCPCS | Performed by: PODIATRIST

## 2021-02-15 PROCEDURE — G8484 FLU IMMUNIZE NO ADMIN: HCPCS | Performed by: PODIATRIST

## 2021-02-15 PROCEDURE — 11721 DEBRIDE NAIL 6 OR MORE: CPT | Performed by: PODIATRIST

## 2021-02-15 PROCEDURE — 17110 DESTRUCTION B9 LES UP TO 14: CPT | Performed by: PODIATRIST

## 2021-02-15 PROCEDURE — 1036F TOBACCO NON-USER: CPT | Performed by: PODIATRIST

## 2021-02-15 PROCEDURE — 99213 OFFICE O/P EST LOW 20 MIN: CPT | Performed by: PODIATRIST

## 2021-02-15 NOTE — PROGRESS NOTES
[facility] PHYSICIANS  [facility]  [address] [place] [address]  Dept: [phone]  Dept Fax: [phone]     PAIN PROGRESS NOTE  Date of patient's visit: 2/15/2021  Patient's Name:  Brando Reddy YOB: 1975            Patient Care Team:  TRES Ward CNP as PCP - General (Certified Nurse Practitioner)  TRES Ward CNP as PCP - [facility]  Rossy Calhoun MD as Consulting Physician (Ophthalmology)  Stephanie Black DPM as Physician (Podiatry)      Chief Complaint   Patient presents with    Foot Pain    Benign Neoplasm       Subjective: This Brando Reddy comes to clinic for foot and nail care. Pt currently has complaint of thickened, painful, elongated nails that he/she cannot manage by themselves. Pt. Relates pain to nails with shoe gear. Pt's primary care physician is TRES Yadav CNP last seen 1/5/21  . Pt has a new complaint of skin lesion that is worsened at left 5th digit. Pt has tried changing shoes but it has not helped the pain     Past Medical History:   Diagnosis Date    Dry skin     GERD (gastroesophageal reflux disease)     Hematuria     HLD (hyperlipidemia) 3/2/2015    Hypertension     AIDEN (obstructive sleep apnea)     Pre-diabetes     Sleep apnea     does not use a machine    Wears dentures     upper       No Known Allergies  Current Outpatient Medications on File Prior to Visit   Medication Sig Dispense Refill    lisinopril-hydroCHLOROthiazide (PRINZIDE;ZESTORETIC) 10-12.5 MG per tablet take 1 tablet by mouth once daily 90 tablet 1    nystatin (MYCOSTATIN) 977953 UNIT/GM powder Apply 2 times daily.  1 Bottle 2    atorvastatin (LIPITOR) 20 MG tablet take 1 tablet by mouth once daily 30 tablet 5    Urea (CARMOL) 40 % cream Apply to feet bid 1 Tube 3  Blood Glucose Monitoring Suppl (ACURA BLOOD GLUCOSE METER) w/Device KIT 1 Device by Does not apply route once for 1 dose 1 kit 0    Lancets MISC 1 each by Does not apply route daily 300 each 1    blood glucose monitor strips Test 1 time a day & as needed for symptoms of irregular blood glucose. Dispense sufficient amount for indicated testing frequency plus additional to accommodate PRN testing needs. 300 strip 1    UREA 20 INTENSIVE HYDRATING 20 % cream apply to FEET twice a day 85 g 3     No current facility-administered medications on file prior to visit. Review of Systems. Review of Systems:   History obtained from chart review and the patient  General ROS: negative for - chills, fatigue, fever, night sweats or weight gain  Constitutional: Negative for chills, diaphoresis, fatigue, fever and unexpected weight change. Musculoskeletal: Positive for arthralgias, gait problem and joint swelling. Neurological ROS: negative for - behavioral changes, confusion, headaches or seizures. Negative for weakness and numbness. Dermatological ROS: negative for - mole changes, rash  Cardiovascular: Negative for leg swelling. Gastrointestinal: Negative for constipation, diarrhea, nausea and vomiting. Objective:  Dermatologic Exam:  Soft tissue lesion to the left foot 5th digit with central core and petechiae. Pain on palpation of lesion. Skin No rashes or nodules noted. .   Skin is thin, with flaky sloughing skin as well as decreased hair growth to the lower leg  Small red hemosiderin deposits seen dorsal foot   Musculoskeletal:     1st MPJ ROM decreased, Bilateral.  Muscle strength 5/5, Bilateral.  Pain present upon palpation of toenails 1-5, Bilateral. decreased medial longitudinal arch, Bilateral.  Ankle ROM decreased,Bilateral.    Dorsally contracted digits present digits 2, Bilateral. Vascular: DP pulses 1/4 bilateral.  PT pulses 0/4 bilateral.   CFT <5 seconds, Bilateral.  Hair growth absent to the level of the digits, Bilateral.  Edema present, Bilateral.  Varicosities absent, Bilateral. Erythema absent, Bilateral    Neurological: Sensation diminshed to light touch to level of digits, Bilateral.  Protective sensation intact 6/10 sites via 5.07/10g Brandon-Zoe Monofilament, Bilateral.  negative Tinel's, Bilateral.  negative Valleix sign, Bilateral.      Integument: Warm, dry, supple, Bilateral.  Open lesion absent, Bilateral.  Interdigital maceration absent to web spaces 4, Bilateral.  Nails 1-5 left and 1-5 right thickened > 3.0 mm, dystrophic and crumbly, discolored with subungual debris. Fissures absent, Bilateral.   General: AAO x 3 in NAD. Derm  Toenail Description  Sites of Onychomycosis Involvement (Check affected area)  [x] [x] [x] [x] [x] [x] [x] [x] [x] [x]  5 4 3 2 1 1 2 3 4 5                          Right                                        Left    Thickness  [x] [x] [x] [x] [x] [x] [x] [x] [x] [x]  5 4 3 2 1 1 2 3 4 5                         Right                                        Left    Dystrophic Changes   [x] [x] [x] [x] [x] [x] [x] [x] [x] [x]  5 4 3 2 1 1 2 3 4 5                         Right                                        Left    Color  [x] [x] [x] [x] [x] [x] [x] [x] [x] [x]  5 4 3 2 1 1 2 3 4 5                          Right                                        Left    Incurvation/Ingrowin   [] [] [] [] [] [] [] [] [] []  5 4 3 2 1 1 2 3 4 5                         Right                                        Left    Inflammation/Pain   [x] [x] [x] [x] [x] [x] [x] [x] [x] [x]  5 4 3  2 1 1 2 3 4 5                         Right                                        Left       Nails are painful 1-10 with direct palpation.       Q7   []Yes  []No                Q8   [x]Yes  []No                     Q9   []Yes    []No  Assessment: 54 y.o. male with:    Diagnosis Orders   1. Benign neoplasm of skin of left foot  94245 - MD DESTRUCTION BENIGN LESIONS UP TO 14   2. Difficulty walking  90844 - MD DEBRIDEMENT OF NAILS, 6 OR MORE   3. Dermatophytosis of nail  20196 - MD DEBRIDEMENT OF NAILS, 6 OR MORE   4. Pain in both lower extremities  08000 - MD DEBRIDEMENT OF NAILS, 6 OR MORE    93995 - MD DESTRUCTION BENIGN LESIONS UP TO 14   5. Ingrown nail  01845 - MD DEBRIDEMENT OF NAILS, 6 OR MORE         Plan:   Pt was evaluated and examined. Patient was given personalized discharge instructions. The lesion was partially excised and Pyrogallic acid was applied under occlusion. The patient will leave in place for 24-48 hours and than remove. The patient tolerated the procedure well and without complication. Nails 1-10 were debrided in length and thickness sharply with a nail nipper and  without incident. Pt will follow up in 9 weeks or sooner if any problems arise. Diagnosis was discussed with the pt and all of their questions were answered in detail. Proper foot hygiene and care was discussed with the pt. Patient to check feet daily and contact the office with any questions/problems/concerns. Other comorbidity noted and will be managed by PCP. Pain waiver discussed with patient and confirmed.    2/15/2021      Electronically signed by Tomasz Glass DPM on 2/15/2021 at 1:10 PM  2/15/2021

## 2021-03-02 ENCOUNTER — TELEPHONE (OUTPATIENT)
Dept: FAMILY MEDICINE CLINIC | Age: 46
End: 2021-03-02

## 2021-04-16 RX ORDER — ATORVASTATIN CALCIUM 20 MG/1
TABLET, FILM COATED ORAL
Qty: 30 TABLET | Refills: 5 | Status: SHIPPED | OUTPATIENT
Start: 2021-04-16 | End: 2021-11-01

## 2021-04-16 NOTE — TELEPHONE ENCOUNTER
Last visit: 1/5/21  Last Med refill: 10/5/20  Does patient have enough medication for 72 hours: No:     Next Visit Date:  Future Appointments   Date Time Provider Carrillo Still   4/19/2021  2:30 PM AAKASH [facility]   4/26/2021  2:30 PM Hardeep Samayoa APRN - CNP [facility]       Health Maintenance   Topic Date Due    Hepatitis C screen  Never done    Pneumococcal 0-64 years Vaccine (1 of 1 - PPSV23) Never done    Diabetic retinal exam  Never done    COVID-19 Vaccine (1) Never done    Hepatitis B vaccine (1 of 3 - Risk 3-dose series) Never done    Diabetic foot exam  08/22/2020    Diabetic microalbuminuria test  05/22/2021    Lipid screen  06/03/2021    Potassium monitoring  06/03/2021    Creatinine monitoring  06/03/2021    A1C test (Diabetic or Prediabetic)  08/20/2021    Flu vaccine (Season Ended) 09/01/2021    DTaP/Tdap/Td vaccine (2 - Td) 12/16/2027    HIV screen  Completed    Hepatitis A vaccine  Aged Out    Hib vaccine  Aged Out    Meningococcal (ACWY) vaccine  Aged Out       Hemoglobin A1C (%)   Date Value   08/20/2020 7.4   05/22/2020 8.8   02/24/2020 8.1             ( goal A1C is < 7)   No results found for: LABMICR  LDL Cholesterol (mg/dL)   Date Value   06/03/2020 102   02/26/2019 104       (goal LDL is <100)   AST (U/L)   Date Value   06/03/2020 31     ALT (U/L)   Date Value   06/03/2020 34     BUN (mg/dL)   Date Value   06/03/2020 11     BP Readings from Last 3 Encounters:   01/05/21 110/78   05/22/20 110/72   02/24/20 124/78          (goal 120/80)    All Future Testing planned in [facility]  Lab Frequency Next Occurrence   PT eval and treat Once 05/14/2020   PT aquatic therapy Once 05/14/2020   Lumbar Epidural Steroid Injection/Caudal Once 11/14/2020   [place] lock IV Once 11/14/2020   Fluoro For Surgical Procedures Once 11/14/2020               Patient Active Problem List:     Hypertension     Rectal bleeding     GERD (gastroesophageal reflux disease)     Dyslipidemia     Lumbar strain     Hematuria     Fracture of right orbital floor (HCC)     Right medial orbital wall fracture (HCC)     Exophthalmos of right eye     Morbidly obese (HCC)     Lumbar radiculopathy

## 2021-04-26 ENCOUNTER — OFFICE VISIT (OUTPATIENT)
Dept: FAMILY MEDICINE CLINIC | Age: 46
End: 2021-04-26
Payer: MEDICAID

## 2021-04-26 VITALS
SYSTOLIC BLOOD PRESSURE: 130 MMHG | OXYGEN SATURATION: 95 % | HEART RATE: 103 BPM | DIASTOLIC BLOOD PRESSURE: 82 MMHG | BODY MASS INDEX: 38.74 KG/M2 | WEIGHT: 240 LBS

## 2021-04-26 DIAGNOSIS — Z12.5 SCREENING FOR MALIGNANT NEOPLASM OF PROSTATE: ICD-10-CM

## 2021-04-26 DIAGNOSIS — Z13.220 SCREENING, LIPID: ICD-10-CM

## 2021-04-26 DIAGNOSIS — G63 POLYNEUROPATHY ASSOCIATED WITH UNDERLYING DISEASE (HCC): ICD-10-CM

## 2021-04-26 DIAGNOSIS — E11.42 TYPE 2 DIABETES MELLITUS WITH DIABETIC POLYNEUROPATHY, WITHOUT LONG-TERM CURRENT USE OF INSULIN (HCC): Primary | ICD-10-CM

## 2021-04-26 LAB — HBA1C MFR BLD: 6.6 %

## 2021-04-26 PROCEDURE — 2022F DILAT RTA XM EVC RTNOPTHY: CPT | Performed by: NURSE PRACTITIONER

## 2021-04-26 PROCEDURE — 3044F HG A1C LEVEL LT 7.0%: CPT | Performed by: NURSE PRACTITIONER

## 2021-04-26 PROCEDURE — G8427 DOCREV CUR MEDS BY ELIG CLIN: HCPCS | Performed by: NURSE PRACTITIONER

## 2021-04-26 PROCEDURE — G8417 CALC BMI ABV UP PARAM F/U: HCPCS | Performed by: NURSE PRACTITIONER

## 2021-04-26 PROCEDURE — 99214 OFFICE O/P EST MOD 30 MIN: CPT | Performed by: NURSE PRACTITIONER

## 2021-04-26 PROCEDURE — 83036 HEMOGLOBIN GLYCOSYLATED A1C: CPT | Performed by: NURSE PRACTITIONER

## 2021-04-26 PROCEDURE — 1036F TOBACCO NON-USER: CPT | Performed by: NURSE PRACTITIONER

## 2021-04-26 RX ORDER — GLUCOSAMINE HCL/CHONDROITIN SU 500-400 MG
CAPSULE ORAL
Qty: 300 STRIP | Refills: 1 | Status: SHIPPED | OUTPATIENT
Start: 2021-04-26

## 2021-04-26 RX ORDER — LANCETS 30 GAUGE
1 EACH MISCELLANEOUS DAILY
Qty: 300 EACH | Refills: 1 | Status: SHIPPED | OUTPATIENT
Start: 2021-04-26

## 2021-04-26 ASSESSMENT — PATIENT HEALTH QUESTIONNAIRE - PHQ9
1. LITTLE INTEREST OR PLEASURE IN DOING THINGS: 0
SUM OF ALL RESPONSES TO PHQ QUESTIONS 1-9: 0
2. FEELING DOWN, DEPRESSED OR HOPELESS: 0

## 2021-04-26 NOTE — PROGRESS NOTES
[address] [place]  [phone]    Ramon Blanc is a 54 y.o. male who presents today for his  medical conditions/complaints as noted below. Ramon Blanc is c/o of Diabetes  . HPI:     HPI     Diabetes Mellitus Type 2: Current symptoms/problems include none. Medication compliance:  compliant all of the time  Diabetic diet compliance:  compliant most of the time,  Weight trend: stable  Current exercise: going to the gym regularly  Barriers: none    Home blood sugar records: patient does not test  Any episodes of hypoglycemia? no  Eye exam current (within one year): yes   reports that he quit smoking about 15 months ago. His smoking use included cigars. He has never used smokeless tobacco.     Wt Readings from Last 3 Encounters:   04/26/21 240 lb (108.9 kg)   02/15/21 236 lb (107 kg)   01/05/21 236 lb (107 kg)         Lab Results   Component Value Date    LABA1C 6.6 04/26/2021    LABA1C 7.4 08/20/2020    LABA1C 8.8 05/22/2020     Lab Results   Component Value Date    CREATININE 0.81 06/03/2020     Lab Results   Component Value Date    ALT 34 06/03/2020    AST 31 06/03/2020     Lab Results   Component Value Date    CHOL 173 02/26/2019    TRIG 156 (H) 02/26/2019    HDL 38 (L) 06/03/2020          Nursing note reviewed and discussed with patient. Patient's medications, allergies, past medical, surgical, social and family histories were reviewed and updated asappropriate. Current Outpatient Medications   Medication Sig Dispense Refill    Lancets MISC 1 each by Does not apply route daily 300 each 1    blood glucose monitor strips Test 1 time a day & as needed for symptoms of irregular blood glucose. Dispense sufficient amount for indicated testing frequency plus additional to accommodate PRN testing needs.  300 strip 1    atorvastatin (LIPITOR) 20 MG tablet take 1 tablet by mouth once daily 30 tablet 5    lisinopril-hydroCHLOROthiazide (PRINZIDE;ZESTORETIC) 10-12.5 MG per tablet take 1 tablet by mouth once daily 90 tablet 1    nystatin (MYCOSTATIN) 450877 UNIT/GM powder Apply 2 times daily. 1 Bottle 2    Urea (CARMOL) 40 % cream Apply to feet bid 1 Tube 3    Blood Glucose Monitoring Suppl (ACURA BLOOD GLUCOSE METER) w/Device KIT 1 Device by Does not apply route once for 1 dose 1 kit 0    UREA 20 INTENSIVE HYDRATING 20 % cream apply to FEET twice a day 85 g 3     No current facility-administered medications for this visit. Past Medical History:   Diagnosis Date    Dry skin     GERD (gastroesophageal reflux disease)     Hematuria     HLD (hyperlipidemia) 3/2/2015    Hypertension     AIDEN (obstructive sleep apnea)     Pre-diabetes     Sleep apnea     does not use a machine    Wears dentures     upper      Past Surgical History:   Procedure Laterality Date    ANKLE SURGERY Left     fracture    ARTHROPLASTY      4th and 5th toe on left foot    COLONOSCOPY  2014    normal    CYSTOSCOPY  2017    retrograde    CYSTOSCOPY Bilateral 3/21/2017    CYSTOSCOPY, RETROGRADE PYELOGRAM performed by Isidoro Heredia MD at [place] [id number]. Left 2017    left foot 5th digit derotational arthroplasty     Family History   Problem Relation Age of Onset    High Blood Pressure Mother     Diabetes Father      Social History     Tobacco Use    Smoking status: Former Smoker     Types: Cigars     Quit date: 2019     Years since quittin.3    Smokeless tobacco: Never Used    Tobacco comment: occassionally   Substance Use Topics    Alcohol use: Yes     Alcohol/week: 4.2 standard drinks     Types: 5 Standard drinks or equivalent per week     Comment: weekends/ 6 drinks      No Known Allergies    Subjective:      Review of Systems   Constitutional: Negative for chills and fever. Respiratory: Negative for cough and shortness of breath. Cardiovascular: Negative for chest pain and leg swelling.      Other pertinent ROS in counseling on the following healthy behaviors: nutrition, exercise and medication adherence  2. Patient given educationalmaterials when available - see patient instructions when applicable  3. Discussed use, benefit, and side effects of prescribed medications. Barriersto medication compliance addressed. All patient questions answered. Pt voiced understanding. 4.  Reviewed prior labs and health maintenance when applicable. 5.  Continue current medications, diet and exercise. CompletedRefills   Requested Prescriptions     Signed Prescriptions Disp Refills    Lancets MISC 300 each 1     Si each by Does not apply route daily    blood glucose monitor strips 300 strip 1     Sig: Test 1 time a day & as needed for symptoms of irregular blood glucose. Dispense sufficient amount for indicated testing frequency plus additional to accommodate PRN testing needs. This note was transcribed using dictation with Dragon services. Efforts were made to correct any errors but some words may be misinterpreted.     Electronically signed by Nikko Leone CNP on 2021 at 7:55 AM

## 2021-04-27 ASSESSMENT — ENCOUNTER SYMPTOMS
COUGH: 0
SHORTNESS OF BREATH: 0

## 2021-05-03 ENCOUNTER — OFFICE VISIT (OUTPATIENT)
Dept: PODIATRY | Age: 46
End: 2021-05-03
Payer: MEDICAID

## 2021-05-03 VITALS — RESPIRATION RATE: 16 BRPM | HEIGHT: 66 IN | WEIGHT: 240 LBS | BODY MASS INDEX: 38.57 KG/M2

## 2021-05-03 DIAGNOSIS — M79.605 PAIN IN BOTH LOWER EXTREMITIES: ICD-10-CM

## 2021-05-03 DIAGNOSIS — M79.604 PAIN IN BOTH LOWER EXTREMITIES: ICD-10-CM

## 2021-05-03 DIAGNOSIS — L60.0 INGROWN NAIL: ICD-10-CM

## 2021-05-03 DIAGNOSIS — B35.1 DERMATOPHYTOSIS OF NAIL: ICD-10-CM

## 2021-05-03 DIAGNOSIS — R26.2 DIFFICULTY WALKING: ICD-10-CM

## 2021-05-03 DIAGNOSIS — D23.72 BENIGN NEOPLASM OF SKIN OF LEFT FOOT: Primary | ICD-10-CM

## 2021-05-03 PROCEDURE — 11721 DEBRIDE NAIL 6 OR MORE: CPT | Performed by: PODIATRIST

## 2021-05-03 PROCEDURE — G8427 DOCREV CUR MEDS BY ELIG CLIN: HCPCS | Performed by: PODIATRIST

## 2021-05-03 PROCEDURE — 1036F TOBACCO NON-USER: CPT | Performed by: PODIATRIST

## 2021-05-03 PROCEDURE — G8417 CALC BMI ABV UP PARAM F/U: HCPCS | Performed by: PODIATRIST

## 2021-05-03 PROCEDURE — 99213 OFFICE O/P EST LOW 20 MIN: CPT | Performed by: PODIATRIST

## 2021-05-03 PROCEDURE — 17110 DESTRUCTION B9 LES UP TO 14: CPT | Performed by: PODIATRIST

## 2021-07-07 ENCOUNTER — OFFICE VISIT (OUTPATIENT)
Dept: PODIATRY | Age: 46
End: 2021-07-07
Payer: MEDICAID

## 2021-07-07 VITALS — BODY MASS INDEX: 37.67 KG/M2 | HEIGHT: 67 IN | WEIGHT: 240 LBS | RESPIRATION RATE: 16 BRPM

## 2021-07-07 DIAGNOSIS — M79.605 PAIN IN BOTH LOWER EXTREMITIES: ICD-10-CM

## 2021-07-07 DIAGNOSIS — R26.2 DIFFICULTY WALKING: ICD-10-CM

## 2021-07-07 DIAGNOSIS — D23.72 BENIGN NEOPLASM OF SKIN OF LEFT FOOT: Primary | ICD-10-CM

## 2021-07-07 DIAGNOSIS — B35.1 DERMATOPHYTOSIS OF NAIL: ICD-10-CM

## 2021-07-07 DIAGNOSIS — L85.3 XEROSIS CUTIS: ICD-10-CM

## 2021-07-07 DIAGNOSIS — M79.604 PAIN IN BOTH LOWER EXTREMITIES: ICD-10-CM

## 2021-07-07 DIAGNOSIS — L60.0 INGROWN NAIL: ICD-10-CM

## 2021-07-07 PROCEDURE — 1036F TOBACCO NON-USER: CPT | Performed by: PODIATRIST

## 2021-07-07 PROCEDURE — 99213 OFFICE O/P EST LOW 20 MIN: CPT | Performed by: PODIATRIST

## 2021-07-07 PROCEDURE — G8417 CALC BMI ABV UP PARAM F/U: HCPCS | Performed by: PODIATRIST

## 2021-07-07 PROCEDURE — G8427 DOCREV CUR MEDS BY ELIG CLIN: HCPCS | Performed by: PODIATRIST

## 2021-07-07 PROCEDURE — 17110 DESTRUCTION B9 LES UP TO 14: CPT | Performed by: PODIATRIST

## 2021-07-07 PROCEDURE — 11721 DEBRIDE NAIL 6 OR MORE: CPT | Performed by: PODIATRIST

## 2021-07-07 NOTE — PROGRESS NOTES
[facility]  [facility]  [address] [place] [address]  Dept: [phone]  Dept Fax: [phone]     PAIN PROGRESS NOTE  Date of patient's visit: 7/7/2021  Patient's Name:  Frantz Bell YOB: 1975            Patient Care Team:  TRES Lozano CNP as PCP - General (Certified Nurse Practitioner)  TRES Lozano CNP as PCP - [facility] Empaneled Provider  Linda Law MD as Consulting Physician (Ophthalmology)  Michael Levin DPM as Physician (Podiatry)      Chief Complaint   Patient presents with    Foot Pain    Benign Neoplasm    Nail Problem       Subjective: This Frantz Blel comes to clinic for foot and nail care. Pt currently has complaint of thickened, painful, elongated nails that he/she cannot manage by themselves. Pt. Relates pain to nails with shoe gear. Pt's primary care physician is TRES Fajardo CNP last seen 04/26/2021. Pt has a new complaint of increased dry skin with painful skin lesion to jhonny feet. Pt has tried changing shoes but it has not helped the pain    Past Medical History:   Diagnosis Date    Dry skin     GERD (gastroesophageal reflux disease)     Hematuria     HLD (hyperlipidemia) 3/2/2015    Hypertension     AIDEN (obstructive sleep apnea)     Pre-diabetes     Sleep apnea     does not use a machine    Wears dentures     upper       No Known Allergies  Current Outpatient Medications on File Prior to Visit   Medication Sig Dispense Refill    Lancets MISC 1 each by Does not apply route daily 300 each 1    blood glucose monitor strips Test 1 time a day & as needed for symptoms of irregular blood glucose. Dispense sufficient amount for indicated testing frequency plus additional to accommodate PRN testing needs.  300 strip 1    atorvastatin (LIPITOR) 20 MG tablet take 1 tablet by mouth once daily 30 tablet 5    lisinopril-hydroCHLOROthiazide (PRINZIDE;ZESTORETIC) 10-12.5 MG per tablet take 1 tablet by mouth once daily 90 tablet 1    nystatin (MYCOSTATIN) 878600 UNIT/GM powder Apply 2 times daily. 1 Bottle 2    Urea (CARMOL) 40 % cream Apply to feet bid 1 Tube 3    UREA 20 INTENSIVE HYDRATING 20 % cream apply to FEET twice a day 85 g 3    Blood Glucose Monitoring Suppl (ACURA BLOOD GLUCOSE METER) w/Device KIT 1 Device by Does not apply route once for 1 dose 1 kit 0     No current facility-administered medications on file prior to visit. Review of Systems. Review of Systems:   History obtained from chart review and the patient  General ROS: negative for - chills, fatigue, fever, night sweats or weight gain  Constitutional: Negative for chills, diaphoresis, fatigue, fever and unexpected weight change. Musculoskeletal: Positive for arthralgias, gait problem and joint swelling. Neurological ROS: negative for - behavioral changes, confusion, headaches or seizures. Negative for weakness and numbness. Dermatological ROS: negative for - mole changes, rash  Cardiovascular: Negative for leg swelling. Gastrointestinal: Negative for constipation, diarrhea, nausea and vomiting. Objective:  Dermatologic Exam:  Soft tissue lesion to the plantar right and left foot and left 5th digit with central core and petechiae. Pain on palpation of lesion. Skin No rashes or nodules noted. .   Skin is thin, with flaky sloughing skin as well as decreased hair growth to the lower leg  Small red hemosiderin deposits seen dorsal foot   Musculoskeletal:     1st MPJ ROM decreased, Bilateral.  Muscle strength 5/5, Bilateral.  Pain present upon palpation of toenails 1-5, Bilateral. decreased medial longitudinal arch, Bilateral.  Ankle ROM decreased,Bilateral.    Dorsally contracted digits present digits 2, Bilateral.     Vascular: DP pulses 1/4 bilateral.  PT pulses 0/4 bilateral.   CFT <5 seconds, Bilateral.  Hair growth absent to the level of the digits, Bilateral.  Edema present, Bilateral.  Varicosities absent, Bilateral. Erythema absent, Bilateral    Neurological: Sensation diminshed to light touch to level of digits, Bilateral.  Protective sensation intact 6/10 sites via 5.07/10g Taylor-Zoe Monofilament, Bilateral.  negative Tinel's, Bilateral.  negative Valleix sign, Bilateral.      Integument: Warm, dry, supple, Bilateral.  Open lesion absent, Bilateral.  Interdigital maceration absent to web spaces 4, Bilateral.  Nails 1-5 left and 1-5 right thickened > 3.0 mm, dystrophic and crumbly, discolored with subungual debris. Fissures absent, Bilateral.   General: AAO x 3 in NAD. Derm  Toenail Description  Sites of Onychomycosis Involvement (Check affected area)  [x] [x] [x] [x] [x] [x] [x] [x] [x] [x]  5 4 3 2 1 1 2 3 4 5                          Right                                        Left    Thickness  [x] [x] [x] [x] [x] [x] [x] [x] [x] [x]  5 4 3 2 1 1 2 3 4 5                         Right                                        Left    Dystrophic Changes   [x] [x] [x] [x] [x] [x] [x] [x] [x] [x]  5 4 3 2 1 1 2 3 4 5                         Right                                        Left    Color  [x] [x] [x] [x] [x] [x] [x] [x] [x] [x]  5 4 3 2 1 1 2 3 4 5                          Right                                        Left    Incurvation/Ingrowin   [] [] [] [] [] [] [] [] [] []  5 4 3 2 1 1 2 3 4 5                         Right                                        Left    Inflammation/Pain   [x] [x] [x] [x] [x] [x] [x] [x] [x] [x]  5 4 3  2 1 1 2 3 4 5                         Right                                        Left       Nails are painful 1-10 with direct palpation. Q7   []Yes  []No                Q8   [x]Yes  []No                     Q9   []Yes    []No  Assessment:  54 y.o. male with:    Diagnosis Orders   1. Benign neoplasm of skin of left foot  33384 - [place] DESTRUCTION BENIGN LESIONS UP TO 14   2.  Dermatophytosis of nail  38129 - [place] DEBRIDEMENT OF NAILS, 6 OR MORE   3. Difficulty walking  36535 - [place] DESTRUCTION BENIGN LESIONS UP TO 14    31368 - [place] DEBRIDEMENT OF NAILS, 6 OR MORE   4. Pain in both lower extremities  19268 - [place] DEBRIDEMENT OF NAILS, 6 OR MORE   5. Ingrown nail  69374 - [place] DEBRIDEMENT OF NAILS, 6 OR MORE   6. Xerosis cutis  65269 - [place] DESTRUCTION BENIGN LESIONS UP TO 14         Plan:   Pt was evaluated and examined. Patient was given personalized discharge instructions. To address xerosis, patient to apply lachydrin cream to feet daily. Pt to monitor for fissures due to dryness. Advised pt to contact office is there are any open lesions. The lesion was partially excised and Pyrogallic acid was applied under occlusion. The patient will leave in place for 24-48 hours and than remove. The patient tolerated the procedure well and without complication. Nails 1-10 were debrided in length and thickness sharply with a nail nipper and  without incident. Pt will follow up in 9 weeks or sooner if any problems arise. Diagnosis was discussed with the pt and all of their questions were answered in detail. Proper foot hygiene and care was discussed with the pt. Patient to check feet daily and contact the office with any questions/problems/concerns. Other comorbidity noted and will be managed by PCP. Pain waiver discussed with patient and confirmed.    7/7/2021      Electronically signed by Gabino Blake DPM on 7/7/2021 at 1:50 PM  7/7/2021

## 2021-08-16 ENCOUNTER — HOSPITAL ENCOUNTER (EMERGENCY)
Age: 46
Discharge: HOME | End: 2021-08-16
Attending: STUDENT IN AN ORGANIZED HEALTH CARE EDUCATION/TRAINING PROGRAM
Payer: MEDICAID

## 2021-08-16 ENCOUNTER — HOSPITAL ENCOUNTER (OUTPATIENT)
Age: 46
Setting detail: SPECIMEN
Discharge: HOME | End: 2021-08-16
Payer: MEDICAID

## 2021-08-16 VITALS
TEMPERATURE: 98.4 F | DIASTOLIC BLOOD PRESSURE: 79 MMHG | HEART RATE: 89 BPM | RESPIRATION RATE: 16 BRPM | WEIGHT: 242.2 LBS | BODY MASS INDEX: 38.01 KG/M2 | OXYGEN SATURATION: 98 % | SYSTOLIC BLOOD PRESSURE: 123 MMHG | HEIGHT: 67 IN

## 2021-08-16 DIAGNOSIS — Z12.5 SCREENING FOR MALIGNANT NEOPLASM OF PROSTATE: ICD-10-CM

## 2021-08-16 DIAGNOSIS — Z20.2 EXPOSURE TO STD: Primary | ICD-10-CM

## 2021-08-16 DIAGNOSIS — E11.42 TYPE 2 DIABETES MELLITUS WITH DIABETIC POLYNEUROPATHY, WITHOUT LONG-TERM CURRENT USE OF INSULIN (HCC): ICD-10-CM

## 2021-08-16 DIAGNOSIS — Z13.220 SCREENING, LIPID: ICD-10-CM

## 2021-08-16 LAB
-: ABNORMAL
ALBUMIN SERPL-MCNC: 4.5 G/DL (ref 3.5–5.2)
ALBUMIN/GLOBULIN RATIO: 1.5 (ref 1–2.5)
ALP BLD-CCNC: 78 U/L (ref 40–129)
ALT SERPL-CCNC: 27 U/L (ref 5–41)
AMORPHOUS: ABNORMAL
ANION GAP SERPL CALCULATED.3IONS-SCNC: 14 MMOL/L (ref 9–17)
AST SERPL-CCNC: 19 U/L
BACTERIA: ABNORMAL
BILIRUB SERPL-MCNC: 0.3 MG/DL (ref 0.3–1.2)
BILIRUBIN URINE: NEGATIVE
BUN BLDV-MCNC: 10 MG/DL (ref 6–20)
BUN/CREAT BLD: NORMAL (ref 9–20)
CALCIUM SERPL-MCNC: 9.5 MG/DL (ref 8.6–10.4)
CASTS UA: ABNORMAL /LPF
CHLORIDE BLD-SCNC: 101 MMOL/L (ref 98–107)
CHOLESTEROL/HDL RATIO: 3.4
CHOLESTEROL: 102 MG/DL
CO2: 25 MMOL/L (ref 20–31)
COLOR: YELLOW
COMMENT UA: ABNORMAL
CREAT SERPL-MCNC: 0.87 MG/DL (ref 0.7–1.2)
CRYSTALS, UA: ABNORMAL /HPF
EPITHELIAL CELLS UA: ABNORMAL /HPF (ref 0–5)
GFR AFRICAN AMERICAN: >60 ML/MIN
GFR NON-AFRICAN AMERICAN: >60 ML/MIN
GFR SERPL CREATININE-BSD FRML MDRD: NORMAL ML/MIN/{1.73_M2}
GFR SERPL CREATININE-BSD FRML MDRD: NORMAL ML/MIN/{1.73_M2}
GLUCOSE BLD-MCNC: 98 MG/DL (ref 70–99)
GLUCOSE URINE: NEGATIVE
HCT VFR BLD CALC: 42.5 % (ref 40.7–50.3)
HDLC SERPL-MCNC: 30 MG/DL
HEMOGLOBIN: 13.2 G/DL (ref 13–17)
KETONES, URINE: NEGATIVE
LDL CHOLESTEROL: 51 MG/DL (ref 0–130)
LEUKOCYTE ESTERASE, URINE: ABNORMAL
MCH RBC QN AUTO: 27 PG (ref 25.2–33.5)
MCHC RBC AUTO-ENTMCNC: 31.1 G/DL (ref 28.4–34.8)
MCV RBC AUTO: 86.9 FL (ref 82.6–102.9)
MUCUS: ABNORMAL
NITRITE, URINE: NEGATIVE
NRBC AUTOMATED: 0 PER 100 WBC
OTHER OBSERVATIONS UA: ABNORMAL
PDW BLD-RTO: 15.3 % (ref 11.8–14.4)
PH UA: 5.5 (ref 5–8)
PLATELET # BLD: 276 K/UL (ref 138–453)
PMV BLD AUTO: 9.3 FL (ref 8.1–13.5)
POTASSIUM SERPL-SCNC: 4.6 MMOL/L (ref 3.7–5.3)
PROSTATE SPECIFIC ANTIGEN: 0.76 UG/L
PROTEIN UA: NEGATIVE
RBC # BLD: 4.89 M/UL (ref 4.21–5.77)
RBC UA: ABNORMAL /HPF (ref 0–2)
RENAL EPITHELIAL, UA: ABNORMAL /HPF
SODIUM BLD-SCNC: 140 MMOL/L (ref 135–144)
SPECIFIC GRAVITY UA: 1.02 (ref 1–1.03)
TOTAL PROTEIN: 7.6 G/DL (ref 6.4–8.3)
TRICHOMONAS: ABNORMAL
TRIGL SERPL-MCNC: 106 MG/DL
TURBIDITY: ABNORMAL
URINE HGB: ABNORMAL
UROBILINOGEN, URINE: NORMAL
VLDLC SERPL CALC-MCNC: ABNORMAL MG/DL (ref 1–30)
WBC # BLD: 9.6 K/UL (ref 3.5–11.3)
WBC UA: ABNORMAL /HPF (ref 0–5)
YEAST: ABNORMAL

## 2021-08-16 PROCEDURE — 99283 EMERGENCY DEPT VISIT LOW MDM: CPT

## 2021-08-16 PROCEDURE — 6360000002 HC RX W HCPCS: Performed by: STUDENT IN AN ORGANIZED HEALTH CARE EDUCATION/TRAINING PROGRAM

## 2021-08-16 PROCEDURE — 96372 THER/PROPH/DIAG INJ SC/IM: CPT

## 2021-08-16 PROCEDURE — 6370000000 HC RX 637 (ALT 250 FOR IP): Performed by: STUDENT IN AN ORGANIZED HEALTH CARE EDUCATION/TRAINING PROGRAM

## 2021-08-16 PROCEDURE — 87591 N.GONORRHOEAE DNA AMP PROB: CPT

## 2021-08-16 PROCEDURE — 81001 URINALYSIS AUTO W/SCOPE: CPT

## 2021-08-16 PROCEDURE — 87491 CHLMYD TRACH DNA AMP PROBE: CPT

## 2021-08-16 RX ORDER — DOXYCYCLINE 100 MG/1
100 CAPSULE ORAL ONCE
Status: COMPLETED | OUTPATIENT
Start: 2021-08-16 | End: 2021-08-16

## 2021-08-16 RX ORDER — DOXYCYCLINE HYCLATE 100 MG
100 TABLET ORAL 2 TIMES DAILY
Qty: 14 TABLET | Refills: 0 | Status: SHIPPED | OUTPATIENT
Start: 2021-08-16 | End: 2021-08-23

## 2021-08-16 RX ORDER — CEFTRIAXONE 1 G/1
1000 INJECTION, POWDER, FOR SOLUTION INTRAMUSCULAR; INTRAVENOUS ONCE
Status: COMPLETED | OUTPATIENT
Start: 2021-08-16 | End: 2021-08-16

## 2021-08-16 RX ADMIN — CEFTRIAXONE SODIUM 1000 MG: 1 INJECTION, POWDER, FOR SOLUTION INTRAMUSCULAR; INTRAVENOUS at 14:30

## 2021-08-16 RX ADMIN — DOXYCYCLINE 100 MG: 100 CAPSULE ORAL at 14:30

## 2021-08-17 LAB
C. TRACHOMATIS DNA ,URINE: NEGATIVE
N. GONORRHOEAE DNA, URINE: ABNORMAL
SPECIMEN DESCRIPTION: ABNORMAL

## 2021-08-18 ASSESSMENT — ENCOUNTER SYMPTOMS
EYE REDNESS: 0
EYE DISCHARGE: 0
ABDOMINAL PAIN: 0
BACK PAIN: 0
SHORTNESS OF BREATH: 0

## 2021-08-24 ENCOUNTER — OFFICE VISIT (OUTPATIENT)
Dept: PRIMARY CARE CLINIC | Age: 46
End: 2021-08-24
Payer: MEDICAID

## 2021-08-24 VITALS — SYSTOLIC BLOOD PRESSURE: 124 MMHG | HEART RATE: 76 BPM | DIASTOLIC BLOOD PRESSURE: 79 MMHG | OXYGEN SATURATION: 97 %

## 2021-08-24 DIAGNOSIS — H00.11 CHALAZION OF RIGHT UPPER EYELID: Primary | ICD-10-CM

## 2021-08-24 PROCEDURE — G8427 DOCREV CUR MEDS BY ELIG CLIN: HCPCS | Performed by: INTERNAL MEDICINE

## 2021-08-24 PROCEDURE — 1036F TOBACCO NON-USER: CPT | Performed by: INTERNAL MEDICINE

## 2021-08-24 PROCEDURE — G8417 CALC BMI ABV UP PARAM F/U: HCPCS | Performed by: INTERNAL MEDICINE

## 2021-08-24 PROCEDURE — 99213 OFFICE O/P EST LOW 20 MIN: CPT | Performed by: INTERNAL MEDICINE

## 2021-08-24 NOTE — PROGRESS NOTES
Visit Information    Have you changed or started any medications since your last visit including any over-the-counter medicines, vitamins, or herbal medicines? no   Are you having any side effects from any of your medications? -  no  Have you stopped taking any of your medications? Is so, why? -  no    Have you seen any other physician or provider since your last visit? Yes - Records Obtained  Have you had any other diagnostic tests since your last visit? Yes - Records Obtained  Have you been seen in the emergency room and/or had an admission to a hospital since we last saw you? Yes - Records Obtained  Have you had your routine dental cleaning in the past 6 months? no    Have you activated your [employer] account? If not, what are your barriers?  Yes     Patient Care Team:  TRES Soria CNP as PCP - General (Certified Nurse Practitioner)  TRES Soria CNP as PCP [facility]  Solange Jacobo MD as Consulting Physician (Ophthalmology)  Adelaida Wiggins DPM as Physician (Podiatry)    Medical History Review  Past Medical, Family, and Social History reviewed and does not contribute to the patient presenting condition    Health Maintenance   Topic Date Due    Hepatitis C screen  Never done    Pneumococcal 0-64 years Vaccine (1 of 2 - PPSV23) Never done    Diabetic retinal exam  Never done    Hepatitis B vaccine (1 of 3 - Risk 3-dose series) Never done    Diabetic foot exam  08/22/2020    Diabetic microalbuminuria test  05/22/2021    COVID-19 Vaccine (2 - Pfizer 2-dose series) 08/25/2021    Flu vaccine (1) 09/01/2021    A1C test (Diabetic or Prediabetic)  04/26/2022    Lipid screen  08/16/2022    Potassium monitoring  08/16/2022    Creatinine monitoring  08/16/2022    Colon cancer screen colonoscopy  12/22/2024    DTaP/Tdap/Td vaccine (2 - Td or Tdap) 12/16/2027    HIV screen  Completed    Hepatitis A vaccine  Aged Out    Hib vaccine  Aged Out    Meningococcal (ACWY) vaccine  Aged Out

## 2021-08-24 NOTE — PROGRESS NOTES
[facility]  [address] [address]  Dept: [phone]  Dept Fax: [phone]    Star Vasques is a 54 y.o. male who presents to the urgent care today for his medicalconditions/complaints as noted below. tSar Vasques is c/o of Cyst (right eye months)      HPI:     Eye Problem   The right (lump/cyst right upper eyelid) eye is affected. This is a chronic problem. The current episode started more than 1 month ago (sseveral months). The problem has been unchanged. There was no injury mechanism. The patient is experiencing no pain. He has tried nothing for the symptoms. The treatment provided no relief. Past Medical History:   Diagnosis Date    Dry skin     GERD (gastroesophageal reflux disease)     Hematuria     HLD (hyperlipidemia) 3/2/2015    Hypertension     AIDEN (obstructive sleep apnea)     Pre-diabetes     Sleep apnea     does not use a machine    Wears dentures     upper        Current Outpatient Medications   Medication Sig Dispense Refill    Lancets MISC 1 each by Does not apply route daily 300 each 1    blood glucose monitor strips Test 1 time a day & as needed for symptoms of irregular blood glucose. Dispense sufficient amount for indicated testing frequency plus additional to accommodate PRN testing needs. 300 strip 1    atorvastatin (LIPITOR) 20 MG tablet take 1 tablet by mouth once daily 30 tablet 5    lisinopril-hydroCHLOROthiazide (PRINZIDE;ZESTORETIC) 10-12.5 MG per tablet take 1 tablet by mouth once daily 90 tablet 1    nystatin (MYCOSTATIN) 466217 UNIT/GM powder Apply 2 times daily. 1 Bottle 2    Urea (CARMOL) 40 % cream Apply to feet bid 1 Tube 3    UREA 20 INTENSIVE HYDRATING 20 % cream apply to FEET twice a day 85 g 3    Blood Glucose Monitoring Suppl (ACURA BLOOD GLUCOSE METER) w/Device KIT 1 Device by Does not apply route once for 1 dose 1 kit 0     No current facility-administered medications for this visit. No Known Allergies    Health Maintenance   Topic Date Due    Hepatitis C screen  Never done    Pneumococcal 0-64 years Vaccine (1 of 2 - PPSV23) Never done    Diabetic retinal exam  Never done    Hepatitis B vaccine (1 of 3 - Risk 3-dose series) Never done    Diabetic foot exam  08/22/2020    Diabetic microalbuminuria test  05/22/2021    COVID-19 Vaccine (2 - Pfizer 2-dose series) 08/25/2021    Flu vaccine (1) 09/01/2021    A1C test (Diabetic or Prediabetic)  04/26/2022    Lipid screen  08/16/2022    Potassium monitoring  08/16/2022    Creatinine monitoring  08/16/2022    Colon cancer screen colonoscopy  12/22/2024    DTaP/Tdap/Td vaccine (2 - Td or Tdap) 12/16/2027    HIV screen  Completed    Hepatitis A vaccine  Aged Out    Hib vaccine  Aged Out    Meningococcal (ACWY) vaccine  Aged Out       Subjective:      Review of Systems   All other systems reviewed and are negative. Objective:     Physical Exam  Vitals reviewed. Constitutional:       Appearance: Normal appearance. HENT:      Head: Normocephalic and atraumatic. Eyes:      General:         Right eye: Hordeolum (cyst upper lid) present. Skin:     General: Skin is warm and dry. Neurological:      General: No focal deficit present. Mental Status: He is alert and oriented to person, place, and time. Psychiatric:         Mood and Affect: Mood normal.         Behavior: Behavior normal.       /79   Pulse 76   SpO2 97%       Assessment:       Diagnosis Orders   1. Chalazion of right upper eyelid  YARY - Katerine Alejandre MD, Ophthalmology, [facility]:      No follow-ups on file. No orders of the defined types were placed in this encounter.     Orders Placed This Encounter   Procedures   hCalino Stein MD, Ophthalmology, [facility]     Referral Priority:   Routine     Referral Type:   Eval and Treat     Referral Reason:   Specialty Services Required     Referred to Provider:   Israel Quesada MD Requested Specialty:   Ophthalmology     Number of Visits Requested:   1            Patient given educational materials - see patient instructions. Discussed use, benefit, and side effects of prescribed medications. All patientquestions answered. Pt voiced understanding.     Electronically signed by Noreen Kumar MD on 8/24/2021at 2:45 PM

## 2021-08-25 DIAGNOSIS — Z76.0 MEDICATION REFILL: ICD-10-CM

## 2021-08-25 RX ORDER — LISINOPRIL AND HYDROCHLOROTHIAZIDE 12.5; 1 MG/1; MG/1
TABLET ORAL
Qty: 90 TABLET | Refills: 1 | Status: SHIPPED | OUTPATIENT
Start: 2021-08-25 | End: 2022-02-21

## 2021-08-25 NOTE — TELEPHONE ENCOUNTER
Last visit: 04/26/2021  Last Med refill: 06/02/2021  Does patient have enough medication for 72 hours: No:     Next Visit Date:  Future Appointments   Date Time Provider [place]   9/15/2021  2:00 PM Julio Patel DPM [address] [employer]   Topic Date Due    Hepatitis C screen  Never done    Pneumococcal 0-64 years Vaccine (1 of 2 - PPSV23) Never done    Diabetic retinal exam  Never done    Hepatitis B vaccine (1 of 3 - Risk 3-dose series) Never done    Diabetic foot exam  08/22/2020    Diabetic microalbuminuria test  05/22/2021    COVID-19 Vaccine (2 - Pfizer 2-dose series) 08/25/2021    Flu vaccine (1) 09/01/2021    A1C test (Diabetic or Prediabetic)  04/26/2022    Lipid screen  08/16/2022    Potassium monitoring  08/16/2022    Creatinine monitoring  08/16/2022    Colon cancer screen colonoscopy  12/22/2024    DTaP/Tdap/Td vaccine (2 - Td or Tdap) 12/16/2027    HIV screen  Completed    Hepatitis A vaccine  Aged Out    Hib vaccine  Aged Out    Meningococcal (ACWY) vaccine  Aged Out       Hemoglobin A1C (%)   Date Value   04/26/2021 6.6   08/20/2020 7.4   05/22/2020 8.8             ( goal A1C is < 7)   No results found for: LABMICR  LDL Cholesterol (mg/dL)   Date Value   08/16/2021 51   06/03/2020 102       (goal LDL is <100)   AST (U/L)   Date Value   08/16/2021 19     ALT (U/L)   Date Value   08/16/2021 27     BUN (mg/dL)   Date Value   08/16/2021 10     BP Readings from Last 3 Encounters:   08/24/21 124/79   08/16/21 123/79   04/26/21 130/82          (goal 120/80)    All Future Testing planned in [facility]  Lab Frequency Next Occurrence   Saline lock IV Once 11/14/2020               Patient Active Problem List:     Hypertension     Rectal bleeding     GERD (gastroesophageal reflux disease)     Dyslipidemia     Lumbar strain     Hematuria     Fracture of right orbital floor ([address]     Right medial orbital wall fracture (HCC)     Exophthalmos of right eye     Morbidly obese (HCC)     Lumbar radiculopathy     Polyneuropathy associated with underlying disease ([facility] [facility]

## 2021-09-22 ENCOUNTER — TELEPHONE (OUTPATIENT)
Dept: FAMILY MEDICINE CLINIC | Age: 46
End: 2021-09-22

## 2021-09-22 NOTE — TELEPHONE ENCOUNTER
----- Message from Zane Sousa sent at 9/22/2021  2:19 PM EDT -----  Subject: Message to Provider    QUESTIONS  Information for Provider? On mychart all ,abs came back normal Does he   still need to come in and discuss   ---------------------------------------------------------------------------  --------------  CALL BACK INFO  What is the best way for the office to contact you? OK to leave message on   voicemail  Preferred Call Back Phone Number? [phone]  ---------------------------------------------------------------------------  --------------  SCRIPT ANSWERS  Relationship to Patient?  Self

## 2021-10-05 ENCOUNTER — VIRTUAL VISIT (OUTPATIENT)
Dept: FAMILY MEDICINE CLINIC | Age: 46
End: 2021-10-05
Payer: MEDICAID

## 2021-10-05 DIAGNOSIS — M54.16 LUMBAR RADICULOPATHY: ICD-10-CM

## 2021-10-05 DIAGNOSIS — R20.0 NUMBNESS OF RIGHT THUMB: Primary | ICD-10-CM

## 2021-10-05 PROCEDURE — 99213 OFFICE O/P EST LOW 20 MIN: CPT | Performed by: NURSE PRACTITIONER

## 2021-10-05 PROCEDURE — G8427 DOCREV CUR MEDS BY ELIG CLIN: HCPCS | Performed by: NURSE PRACTITIONER

## 2021-10-05 RX ORDER — PREDNISONE 20 MG/1
20 TABLET ORAL 2 TIMES DAILY
Qty: 10 TABLET | Refills: 0 | Status: SHIPPED | OUTPATIENT
Start: 2021-10-05 | End: 2021-10-10

## 2021-10-05 ASSESSMENT — ENCOUNTER SYMPTOMS
SHORTNESS OF BREATH: 0
COUGH: 0

## 2021-10-05 NOTE — PROGRESS NOTES
VISIT LOCATION: Home    TELEHEALTH EVALUATION -- Audio/Visual (During KRYXI-48 public health emergency)    Due to COVID 23 outbreak, patient's office visit was converted to a virtual visit. Patient was contacted and agreed to proceed with a virtual visit via [email or web address] me  The risks and benefits of converting to a virtual visit were discussed in light of the current infectious disease epidemic. Patient also understood that insurance coverage and co-pays are up to their individual insurance plans. Shabbirwalker Gu (:  1975) has requested an audio/video evaluation for the following concern(s):    Chief Complaint:   HPI:  [facility] has seen ns and pain management for lumbar radiculopathy. Pain management suggested [facility] may 2020[facility] The number was provided and he is going to continue f/u with them. Still having thumb numbness. He did have an emg that showed: mild median neuropathy. He was given nocturnal splints which he reports he did wear. He states nothing has helped. He denies that he has ever had a steroid for this issue. Review of Systems   Constitutional: Negative for chills and fever. Respiratory: Negative for cough and shortness of breath. Cardiovascular: Negative for chest pain and leg swelling. Prior to Visit Medications    Medication Sig Taking? Authorizing Provider   predniSONE (DELTASONE) 20 MG tablet Take 1 tablet by mouth 2 times daily for 5 days Yes Unknown TRES Jang CNP   lisinopril-hydroCHLOROthiazide (PRINZIDE;ZESTORETIC) 10-12.5 MG per tablet take 1 tablet by mouth once daily Yes Unknown TRES Jang CNP   Lancets MISC 1 each by Does not apply route daily Yes Unknown TRES Jang CNP   blood glucose monitor strips Test 1 time a day & as needed for symptoms of irregular blood glucose. Dispense sufficient amount for indicated testing frequency plus additional to accommodate PRN testing needs.  Yes TRES Mcdonald CNP   atorvastatin (LIPITOR) 20 MG tablet take 1 tablet by mouth once daily Yes TRES Mcdonald CNP   nystatin (MYCOSTATIN) 364921 UNIT/GM powder Apply 2 times daily. Yes Dolores Martell DPM   Urea (CARMOL) 40 % cream Apply to feet bid Yes Dolores Martell DPM   Blood Glucose Monitoring Suppl (ACURA BLOOD GLUCOSE METER) w/Device KIT 1 Device by Does not apply route once for 1 dose Yes TRES Mcdonald CNP   UREA 20 INTENSIVE HYDRATING 20 % cream apply to FEET twice a day Yes Dolores Martell DPM     Social- none    Past Medical History:   Diagnosis Date    Dry skin     GERD (gastroesophageal reflux disease)     Hematuria     HLD (hyperlipidemia) 3/2/2015    Hypertension     AIDEN (obstructive sleep apnea)     Pre-diabetes     Sleep apnea     does not use a machine    Wears dentures     upper   ,   Past Surgical History:   Procedure Laterality Date    ANKLE SURGERY Left     fracture    ARTHROPLASTY      4th and 5th toe on left foot    COLONOSCOPY  12/22/2014    normal    CYSTOSCOPY  03/21/2017    retrograde    CYSTOSCOPY Bilateral 3/21/2017    CYSTOSCOPY, RETROGRADE PYELOGRAM performed by Gege Hess MD at [address] Left 03/03/2017    left foot 5th digit derotational arthroplasty             [] OTHER:    Constitutional: [x] Appears well-developed and well-nourished [] No apparent distress                            [] Abnormal-   Mental status  [x] Alert and awake  [x] Oriented to person/place/time [x]Able to follow commands       Eyes:  EOM    [x]  Normal  [] Abnormal-  Sclera  [x]  Normal  [] Abnormal -         Discharge [x]  None visible  [] Abnormal -     HENT:   [x] Normocephalic, atraumatic.   [] Abnormal   [] Mouth/Throat: Mucous membranes are moist.      External Ears [x] Normal  [] Abnormal-      Neck: [x] No visualized mass      Pulmonary/Chest: [x] Respiratory effort normal.  [] No visualized signs of difficulty breathing or respiratory distress        [] Abnormal-      Musculoskeletal:   [] Normal gait with no signs of ataxia         [x] Normal range of motion of neck        [] Abnormal-   [] Motor grossly intact in visible upper extremities    [] Motor grossly intact in visible lower extremities        Neurological:        [x] No Facial Asymmetry (Cranial nerve 7 motor function) (limited exam to video visit)                       [x] No gaze palsy        [] Abnormal-         Skin:                     [x] No significant exanthematous lesions or discoloration noted on facial skin         [] Abnormal-                                  Psychiatric:           [x] Normal Affect [] No Hallucinations        [] Abnormal- [] Normal Mood  [] Anxious appearing    [] Depressed appearing  [] Confused appearing      Due to this being a TeleHealth encounter, evaluation of the following organ systems is limited: Vitals/Constitutional/EENT/Resp/CV/GI//MS/Neuro/Skin/Heme-Lymph-Imm. ASSESSMENT/PLAN:  Encounter Diagnoses   Name Primary?  Numbness of right thumb Yes    Lumbar radiculopathy        Orders Placed This Encounter   Medications    predniSONE (DELTASONE) 20 MG tablet     Sig: Take 1 tablet by mouth 2 times daily for 5 days     Dispense:  10 tablet     Refill:  0         No follow-ups on file. An  electronic signature was used to authenticate this note. --TRES Hernandez - CNP on 10/5/2021 at 1:19 PM        Pursuant to the emergency declaration under the [facility], [facility] waiver authority and the [employer] and [employer] Act, this Virtual  Visit was conducted, with patient's consent, to reduce the patient's risk of exposure to COVID-19 and provide continuity of care for an established patient. Services were provided through a telephone discussion virtually to substitute for in-person clinic visit.

## 2021-10-07 ENCOUNTER — TELEPHONE (OUTPATIENT)
Dept: PAIN MANAGEMENT | Age: 46
End: 2021-10-07

## 2021-10-07 ENCOUNTER — TELEPHONE (OUTPATIENT)
Dept: FAMILY MEDICINE CLINIC | Age: 46
End: 2021-10-07

## 2021-10-07 DIAGNOSIS — G63 POLYNEUROPATHY ASSOCIATED WITH UNDERLYING DISEASE (HCC): ICD-10-CM

## 2021-10-07 DIAGNOSIS — M54.16 LUMBAR RADICULOPATHY: Primary | ICD-10-CM

## 2021-10-07 NOTE — TELEPHONE ENCOUNTER
Spoke with patient and he would like to go back to [facility] pain management. States they need a new referral.  Referral placed.

## 2021-10-07 NOTE — TELEPHONE ENCOUNTER
Patient calls [facility] to make an appointment. I advised we have not seen him since May of 2020; will need a new referral.  Once this office receives it, we will call to schedule an appointment. Patient verbalizes understanding.

## 2021-10-25 ENCOUNTER — TELEPHONE (OUTPATIENT)
Dept: PAIN MANAGEMENT | Age: 46
End: 2021-10-25

## 2021-10-29 NOTE — TELEPHONE ENCOUNTER
Last visit: 10/5/21  Last Med refill: 4/16/21  Does patient have enough medication for 72 hours: Yes    Next Visit Date:  Future Appointments   Date Time Provider Carrillo Still   11/8/2021  9:15 AM Prateek Mcmillan DPM [address] [employer] Maintenance   Topic Date Due    Hepatitis C screen  Never done    Pneumococcal 0-64 years Vaccine (1 of 2 - PPSV23) Never done    Diabetic retinal exam  Never done    Hepatitis B vaccine (1 of 3 - Risk 3-dose series) Never done    Diabetic foot exam  08/22/2020    Diabetic microalbuminuria test  05/22/2021    Flu vaccine (1) Never done    A1C test (Diabetic or Prediabetic)  04/26/2022    Lipid screen  08/16/2022    Potassium monitoring  08/16/2022    Creatinine monitoring  08/16/2022    Colon cancer screen colonoscopy  12/22/2024    DTaP/Tdap/Td vaccine (2 - Td or Tdap) 12/16/2027    COVID-19 Vaccine  Completed    HIV screen  Completed    Hepatitis A vaccine  Aged Out    Hib vaccine  Aged Out    Meningococcal (ACWY) vaccine  Aged Out       Hemoglobin A1C (%)   Date Value   04/26/2021 6.6   08/20/2020 7.4   05/22/2020 8.8             ( goal A1C is < 7)   No results found for: LABMICR  LDL Cholesterol (mg/dL)   Date Value   08/16/2021 51   06/03/2020 102       (goal LDL is <100)   AST (U/L)   Date Value   08/16/2021 19     ALT (U/L)   Date Value   08/16/2021 27     BUN (mg/dL)   Date Value   08/16/2021 10     BP Readings from Last 3 Encounters:   08/24/21 124/79   08/16/21 123/79   04/26/21 130/82          (goal 120/80)    All Future Testing planned in [facility]  Lab Frequency Next Occurrence   Saline lock IV Once 11/14/2020               Patient Active Problem List:     Hypertension     Rectal bleeding     GERD (gastroesophageal reflux disease)     Dyslipidemia     Lumbar strain     Hematuria     Fracture of right orbital floor ([address]     Right medial orbital wall fracture (HCC)     Exophthalmos of right eye     Morbidly obese (HCC)     Lumbar radiculopathy     Polyneuropathy associated with underlying disease ([facility]

## 2021-11-01 RX ORDER — ATORVASTATIN CALCIUM 20 MG/1
TABLET, FILM COATED ORAL
Qty: 30 TABLET | Refills: 5 | Status: SHIPPED | OUTPATIENT
Start: 2021-11-01 | End: 2022-04-25

## 2021-11-17 ENCOUNTER — OFFICE VISIT (OUTPATIENT)
Dept: PODIATRY | Age: 46
End: 2021-11-17
Payer: MEDICAID

## 2021-11-17 VITALS — BODY MASS INDEX: 37.67 KG/M2 | HEIGHT: 67 IN | WEIGHT: 240 LBS

## 2021-11-17 DIAGNOSIS — L60.0 INGROWN NAIL: ICD-10-CM

## 2021-11-17 DIAGNOSIS — L84 HELOMA MOLLE: ICD-10-CM

## 2021-11-17 DIAGNOSIS — B35.1 DERMATOPHYTOSIS OF NAIL: ICD-10-CM

## 2021-11-17 DIAGNOSIS — L85.3 XEROSIS CUTIS: ICD-10-CM

## 2021-11-17 DIAGNOSIS — D23.71 BENIGN NEOPLASM OF SKIN OF RIGHT FOOT: ICD-10-CM

## 2021-11-17 DIAGNOSIS — M79.604 PAIN IN BOTH LOWER EXTREMITIES: ICD-10-CM

## 2021-11-17 DIAGNOSIS — D23.72 BENIGN NEOPLASM OF SKIN OF LEFT FOOT: Primary | ICD-10-CM

## 2021-11-17 DIAGNOSIS — M79.605 PAIN IN BOTH LOWER EXTREMITIES: ICD-10-CM

## 2021-11-17 PROCEDURE — G8427 DOCREV CUR MEDS BY ELIG CLIN: HCPCS | Performed by: PODIATRIST

## 2021-11-17 PROCEDURE — G8417 CALC BMI ABV UP PARAM F/U: HCPCS | Performed by: PODIATRIST

## 2021-11-17 PROCEDURE — 1036F TOBACCO NON-USER: CPT | Performed by: PODIATRIST

## 2021-11-17 PROCEDURE — 99213 OFFICE O/P EST LOW 20 MIN: CPT | Performed by: PODIATRIST

## 2021-11-17 PROCEDURE — G8484 FLU IMMUNIZE NO ADMIN: HCPCS | Performed by: PODIATRIST

## 2021-11-17 PROCEDURE — 17110 DESTRUCTION B9 LES UP TO 14: CPT | Performed by: PODIATRIST

## 2021-11-17 PROCEDURE — 11721 DEBRIDE NAIL 6 OR MORE: CPT | Performed by: PODIATRIST

## 2021-11-17 NOTE — PROGRESS NOTES
[facility] PHYSICIANS  [facility]  [address] [place] [address]  Dept: [phone]  Dept Fax: [phone]     FOOT PAIN & BENIGN NEOPLASM PROGRESS NOTE  Date of patient's visit: 11/17/2021  Patient's Name:  Nacho Samuels YOB: 1975            Patient Care Team:  Clinton Severin, APRN - CNP as PCP - General (Certified Nurse Practitioner)  Clinton Severin, APRN - CNP as PCP - [facility]  Shena Shabazz MD as Consulting Physician (Ophthalmology)  Hayden Rosario DPM as Physician (Podiatry)          Chief Complaint   Patient presents with    Foot Pain     b/l    Nail Problem     tn    Benign Neoplasm       Subjective: This Nacho Samuels comes to clinic for foot and nail care. Pt currently has complaint of thickened, painful, elongated nails that he/she cannot manage by themselves. Pt. Relates pain to nails with shoe gear. Pt's primary care physician is Som Raza seen 10/5/21. Past Medical History:   Diagnosis Date    Dry skin     GERD (gastroesophageal reflux disease)     Hematuria     HLD (hyperlipidemia) 3/2/2015    Hypertension     AIDEN (obstructive sleep apnea)     Pre-diabetes     Sleep apnea     does not use a machine    Wears dentures     upper     Pt has a new complaint of increased painful skin lesion to his right 5th toe that recently noticed. Pt has tried changing shoes but it has not helped the pain. No Known Allergies  Current Outpatient Medications on File Prior to Visit   Medication Sig Dispense Refill    atorvastatin (LIPITOR) 20 MG tablet take 1 tablet by mouth once daily 30 tablet 5    lisinopril-hydroCHLOROthiazide (PRINZIDE;ZESTORETIC) 10-12.5 MG per tablet take 1 tablet by mouth once daily 90 tablet 1    Lancets MISC 1 each by Does not apply route daily 300 each 1    blood glucose monitor strips Test 1 time a day & as needed for symptoms of irregular blood glucose. Hair growth absent to the level of the digits, Bilateral.  Edema present, Bilateral.  Varicosities absent, Bilateral. Erythema absent, Bilateral    Neurological: Sensation diminshed to light touch to level of digits, Bilateral.  Protective sensation intact 6/10 sites via 5.07/10g Dutton-Zoe Monofilament, Bilateral.  negative Tinel's, Bilateral.  negative Valleix sign, Bilateral.      Integument: Warm, dry, supple, Bilateral.  Open lesion absent, Bilateral.  Interdigital maceration absent to web spaces 4, Bilateral.  Nails 1-5 left and 1-5 right thickened > 3.0 mm, dystrophic and crumbly, discolored with subungual debris. Fissures absent, Bilateral.   General: AAO x 3 in NAD. Derm  Toenail Description  Sites of Onychomycosis Involvement (Check affected area)  [x] [x] [x] [x] [x] [x] [x] [x] [x] [x]  5 4 3 2 1 1 2 3 4 5                          Right                                        Left    Thickness  [x] [x] [x] [x] [x] [x] [x] [x] [x] [x]  5 4 3 2 1 1 2 3 4 5                         Right                                        Left    Dystrophic Changes   [x] [x] [x] [x] [x] [x] [x] [x] [x] [x]  5 4 3 2 1 1 2 3 4 5                         Right                                        Left    Color  [x] [x] [x] [x] [x] [x] [x] [x] [x] [x]  5 4 3 2 1 1 2 3 4 5                          Right                                        Left    Incurvation/Ingrowin   [] [] [] [] [] [] [] [] [] []  5 4 3 2 1 1 2 3 4 5                         Right                                        Left    Inflammation/Pain   [x] [x] [x] [x] [x] [x] [x] [x] [x] [x]  5 4 3  2 1 1 2 3 4 5                         Right                                        Left       Nails are painful 1-10 with direct palpation. Q7   []Yes  []No                Q8   [x]Yes  []No                     Q9   []Yes    []No    Assessment:  51 y.o. male with:    Diagnosis Orders   1.  Benign neoplasm of skin of left foot  19896 - OR DESTRUCTION BENIGN LESIONS UP TO 14   2. Dermatophytosis of nail  96129 - SD DEBRIDEMENT OF NAILS, 6 OR MORE   3. Pain in both lower extremities  29612 - SD DEBRIDEMENT OF NAILS, 6 OR MORE    00647 - SD DESTRUCTION BENIGN LESIONS UP TO 14   4. Ingrown nail  33076 - SD DEBRIDEMENT OF NAILS, 6 OR MORE   5. Heloma molle  09401 - SD DESTRUCTION BENIGN LESIONS UP TO 14   6. Benign neoplasm of skin of right foot  17049 - SD DESTRUCTION BENIGN LESIONS UP TO 14   7. Xerosis cutis           Plan:   Pt was evaluated and examined. Patient was given personalized discharge instructions. Nails 1-10 were debrided in length and thickness sharply with a nail nipper and  without incident. Diagnosis was discussed with the pt and all of their questions were answered in detail. Proper foot hygiene and care was discussed with the pt. Patient to check feet daily and contact the office with any questions/problems/concerns. Other comorbidity noted and will be managed by PCP. Pain waiver discussed with patient and confirmed. 11/17/2021    The lesion was partially debrided and silver nitrate was applied with an apperature pad under occlusion. The patient will leave in place for 24-48 hours and than remove. The patient tolerated the procedure well and without complication. Discussed that surgical excision of skin lesion is recommended if pain worsens or persists. At this time he would like to wait.  Pt will follow up in 1 months         Electronically signed by Andres Rondon DPM on 11/17/2021 at 3:52 PM  11/17/2021

## 2021-11-17 NOTE — PATIENT INSTRUCTIONS
Schedule a Vaccine  When you qualify to receive the vaccine, call the [facility]) COVID-19 Vaccination Hotline to schedule your appointment or to get additional information about the [facility]) locations which are offering the COVID-19 vaccine. To be 94% effective, it's important that you receive two doses of one of the COVID-19 vaccines. -If you are receiving the [place] vaccine, your second shot will be scheduled as close to 21 days after the first shot as possible. -If you are receiving the Moderna vaccine, your second shot will be scheduled as close to 28 days after the first shot as possible. [facility]) COVID-19 Vaccination Hotline: [phone]    Links to [facility]) website and [facility] website:    [employer]/[facility]-[email or web address]-[email or web address]-covid-[email or web address]-[email or web address]-[email or web address]    [email or web address]

## 2021-11-18 RX ORDER — UREA 20 %
CREAM (GRAM) TOPICAL
Qty: 85 G | Refills: 3 | Status: SHIPPED | OUTPATIENT
Start: 2021-11-18 | End: 2022-08-02 | Stop reason: SDUPTHER

## 2022-02-19 DIAGNOSIS — Z76.0 MEDICATION REFILL: ICD-10-CM

## 2022-02-21 RX ORDER — LISINOPRIL AND HYDROCHLOROTHIAZIDE 12.5; 1 MG/1; MG/1
TABLET ORAL
Qty: 90 TABLET | Refills: 0 | Status: SHIPPED | OUTPATIENT
Start: 2022-02-21 | End: 2022-05-03 | Stop reason: SDUPTHER

## 2022-02-21 NOTE — TELEPHONE ENCOUNTER
Last visit: 10/5/21  Last Med refill: 8/25/21  Does patient have enough medication for 72 hours: No:     Next Visit Date:  Future Appointments   Date Time Provider Carrillo Still   2/28/2022  2:30 PM Rosemarie Stauffer, [address] Maintenance   Topic Date Due    Hepatitis C screen  Never done    Pneumococcal 0-64 years Vaccine (1 of 2 - PPSV23) Never done    Diabetic retinal exam  Never done    Hepatitis B vaccine (1 of 3 - Risk 3-dose series) Never done    Diabetic foot exam  08/22/2020    Diabetic microalbuminuria test  05/22/2021    Flu vaccine (1) Never done    COVID-19 Vaccine (3 - Booster for Pfizer series) 01/25/2022    A1C test (Diabetic or Prediabetic)  04/26/2022    Depression Screen  04/26/2022    Lipid screen  08/16/2022    Potassium monitoring  08/16/2022    Creatinine monitoring  08/16/2022    Colorectal Cancer Screen  12/22/2024    DTaP/Tdap/Td vaccine (2 - Td or Tdap) 12/16/2027    HIV screen  Completed    Hepatitis A vaccine  Aged Out    Hib vaccine  Aged Out    Meningococcal (ACWY) vaccine  Aged Out       Hemoglobin A1C (%)   Date Value   04/26/2021 6.6   08/20/2020 7.4   05/22/2020 8.8             ( goal A1C is < 7)   No results found for: LABMICR  LDL Cholesterol (mg/dL)   Date Value   08/16/2021 51   06/03/2020 102       (goal LDL is <100)   AST (U/L)   Date Value   08/16/2021 19     ALT (U/L)   Date Value   08/16/2021 27     BUN (mg/dL)   Date Value   08/16/2021 10     BP Readings from Last 3 Encounters:   08/24/21 124/79   08/16/21 123/79   04/26/21 130/82          (goal 120/80)    All Future Testing planned in [facility]  Lab Frequency Next Occurrence               Patient Active Problem List:     Hypertension     Rectal bleeding     GERD (gastroesophageal reflux disease)     Dyslipidemia     Lumbar strain     Hematuria     Fracture of right orbital floor (HCC)     Right medial orbital wall fracture (HCC)     Exophthalmos of right eye     Morbidly obese [facility])     Lumbar radiculopathy     Polyneuropathy associated with underlying disease ([facility]

## 2022-03-02 ENCOUNTER — OFFICE VISIT (OUTPATIENT)
Dept: PODIATRY | Age: 47
End: 2022-03-02
Payer: MEDICAID

## 2022-03-02 VITALS — HEIGHT: 67 IN | BODY MASS INDEX: 39.71 KG/M2 | RESPIRATION RATE: 16 BRPM | WEIGHT: 253 LBS

## 2022-03-02 DIAGNOSIS — L84 HELOMA MOLLE: ICD-10-CM

## 2022-03-02 DIAGNOSIS — D23.72 BENIGN NEOPLASM OF SKIN OF LEFT FOOT: Primary | ICD-10-CM

## 2022-03-02 DIAGNOSIS — L85.3 XEROSIS CUTIS: ICD-10-CM

## 2022-03-02 DIAGNOSIS — M79.604 PAIN IN BOTH LOWER EXTREMITIES: ICD-10-CM

## 2022-03-02 DIAGNOSIS — M79.605 PAIN IN BOTH LOWER EXTREMITIES: ICD-10-CM

## 2022-03-02 DIAGNOSIS — L60.0 INGROWN NAIL: ICD-10-CM

## 2022-03-02 DIAGNOSIS — B35.1 DERMATOPHYTOSIS OF NAIL: ICD-10-CM

## 2022-03-02 PROCEDURE — G8417 CALC BMI ABV UP PARAM F/U: HCPCS | Performed by: PODIATRIST

## 2022-03-02 PROCEDURE — 1036F TOBACCO NON-USER: CPT | Performed by: PODIATRIST

## 2022-03-02 PROCEDURE — G8484 FLU IMMUNIZE NO ADMIN: HCPCS | Performed by: PODIATRIST

## 2022-03-02 PROCEDURE — 99213 OFFICE O/P EST LOW 20 MIN: CPT | Performed by: PODIATRIST

## 2022-03-02 PROCEDURE — 11721 DEBRIDE NAIL 6 OR MORE: CPT | Performed by: PODIATRIST

## 2022-03-02 PROCEDURE — 17110 DESTRUCTION B9 LES UP TO 14: CPT | Performed by: PODIATRIST

## 2022-03-02 PROCEDURE — G8427 DOCREV CUR MEDS BY ELIG CLIN: HCPCS | Performed by: PODIATRIST

## 2022-03-02 RX ORDER — UREA 40 %
CREAM (GRAM) TOPICAL
Qty: 1 EACH | Refills: 3 | Status: SHIPPED | OUTPATIENT
Start: 2022-03-02 | End: 2022-08-02 | Stop reason: SDUPTHER

## 2022-03-02 NOTE — PROGRESS NOTES
[address] [facility] PODIATRY [facility]  [address] [address] [place] [address]  Dept: [phone]  Dept Fax: [phone]     FOOT PAIN & BENIGN NEOPLASM PROGRESS NOTE  Date of patient's visit: 3/2/2022  Patient's Name:  Kim Flores YOB: 1975            Patient Care Team:  TRES Murdock CNP as PCP - General (Certified Nurse Practitioner)  TRES Murdock [facility] P[facility]  Armando Pineda MD as Consulting Physician (Ophthalmology)  Manuel Mcneil DPM as Physician (Podiatry)          Chief Complaint   Patient presents with    Nail Problem    Benign Neoplasm       Subjective: This Kim Flores comes to clinic for foot and nail care. Pt currently has complaint of thickened, painful, elongated nails that he/she cannot manage by themselves. Pt. Relates pain to nails with shoe gear. Pt's primary care physician is Som Landa seen 10/05/2021. Past Medical History:   Diagnosis Date    Dry skin     GERD (gastroesophageal reflux disease)     Hematuria     HLD (hyperlipidemia) 3/2/2015    Hypertension     AIDEN (obstructive sleep apnea)     Pre-diabetes     Sleep apnea     does not use a machine    Wears dentures     upper     Pt has a new complaint of increased dry skin with painful skin lesions to jhonny feet.         No Known Allergies  Current Outpatient Medications on File Prior to Visit   Medication Sig Dispense Refill    lisinopril-hydroCHLOROthiazide (PRINZIDE;ZESTORETIC) 10-12.5 MG per tablet take 1 tablet by mouth once daily 90 tablet 0    urea (UREA 20 INTENSIVE HYDRATING) 20 % cream apply to FEET twice a day 85 g 3    atorvastatin (LIPITOR) 20 MG tablet take 1 tablet by mouth once daily 30 tablet 5    Lancets MISC 1 each by Does not apply route daily 300 each 1    blood glucose monitor strips Test 1 time a day & as needed for symptoms of irregular blood glucose. Dispense sufficient amount for indicated testing frequency plus additional to accommodate PRN testing needs. 300 strip 1    nystatin (MYCOSTATIN) 915918 UNIT/GM powder Apply 2 times daily. 1 Bottle 2    Urea (CARMOL) 40 % cream Apply to feet bid 1 Tube 3    Blood Glucose Monitoring Suppl (ACURA BLOOD GLUCOSE METER) w/Device KIT 1 Device by Does not apply route once for 1 dose 1 kit 0     No current facility-administered medications on file prior to visit. Review of Systems    Review of Systems:   History obtained from chart review and the patient  General ROS: negative for - chills, fatigue, fever, night sweats or weight gain  Constitutional: Negative for chills, diaphoresis, fatigue, fever and unexpected weight change. Musculoskeletal: Positive for arthralgias, gait problem and joint swelling. Neurological ROS: negative for - behavioral changes, confusion, headaches or seizures. Negative for weakness and numbness. Dermatological ROS: negative for - mole changes, rash  Cardiovascular: Negative for leg swelling. Gastrointestinal: Negative for constipation, diarrhea, nausea and vomiting. Objective:  Dermatologic Exam:  Soft tissue lesion to the plantar right and left foot with central core and petechiae. Pain on palpation of lesion. Skin No rashes or nodules noted. .   Skin is thin, with flaky sloughing skin as well as decreased hair growth to the lower leg  Small red hemosiderin deposits seen dorsal foot   Musculoskeletal:     1st MPJ ROM decreased, Bilateral.  Muscle strength 5/5, Bilateral.  Pain present upon palpation of toenails 1-5, Bilateral. decreased medial longitudinal arch, Bilateral.  Ankle ROM decreased,Bilateral.    Dorsally contracted digits present digits 2, Bilateral.     Vascular: DP pulses 1/4 bilateral.  PT pulses 0/4 bilateral.   CFT <5 seconds, Bilateral.  Hair growth absent to the level of the digits, Bilateral.  Edema present, Bilateral.  Varicosities absent, Bilateral. Erythema absent, Bilateral    Neurological: Sensation diminshed to light touch to level of digits, Bilateral.  Protective sensation intact 6/10 sites via 5.07/10g Timpson-Zoe Monofilament, Bilateral.  negative Tinel's, Bilateral.  negative Valleix sign, Bilateral.      Integument: Warm, dry, supple, Bilateral.  Open lesion absent, Bilateral.  Interdigital maceration absent to web spaces 4, Bilateral.  Nails 1-5 left and 1-5 right thickened > 3.0 mm, dystrophic and crumbly, discolored with subungual debris. Fissures absent, Bilateral.   General: AAO x 3 in NAD. Derm  Toenail Description  Sites of Onychomycosis Involvement (Check affected area)  [x] [x] [x] [x] [x] [x] [x] [x] [x] [x]  5 4 3 2 1 1 2 3 4 5                          Right                                        Left    Thickness  [x] [x] [x] [x] [x] [x] [x] [x] [x] [x]  5 4 3 2 1 1 2 3 4 5                         Right                                        Left    Dystrophic Changes   [x] [x] [x] [x] [x] [x] [x] [x] [x] [x]  5 4 3 2 1 1 2 3 4 5                         Right                                        Left    Color  [x] [x] [x] [x] [x] [x] [x] [x] [x] [x]  5 4 3 2 1 1 2 3 4 5                          Right                                        Left    Incurvation/Ingrowin   [] [] [] [] [] [] [] [] [] []  5 4 3 2 1 1 2 3 4 5                         Right                                        Left    Inflammation/Pain   [x] [x] [x] [x] [x] [x] [x] [x] [x] [x]  5 4 3  2 1 1 2 3 4 5                         Right                                        Left       Nails are painful 1-10 with direct palpation. Q7   []Yes  []No                Q8   [x]Yes  []No                     Q9   []Yes    []No    Assessment:  51 y.o. male with:    Diagnosis Orders   1. Benign neoplasm of skin of left foot  04361 - [place] DESTRUCTION BENIGN LESIONS UP TO 14   2. Dermatophytosis of nail  22771 - [place] DEBRIDEMENT OF NAILS, 6 OR MORE   3. Pain in both lower extremities  63549 - [place] DEBRIDEMENT OF NAILS, 6 OR MORE   4. Heloma molle  70556 - [place] DESTRUCTION BENIGN LESIONS UP TO 14   5. Xerosis cutis     6. Ingrown nail  33655 - [place] DEBRIDEMENT OF NAILS, 6 OR MORE           Plan:   Pt was evaluated and examined. Patient was given personalized discharge instructions. The lesions were partially excised via 15 blade and silver nitrate was applied under occlusion. The patient tolerated the procedure well and without complication. Advised patient to use vasoline to the area after tomorrow to prevent surrounding tissue irritation. To address urea, patient to apply lachydrin cream to feet daily. Pt to monitor for fissures due to dryness. Advised pt to contact office is there are any open lesions. Nails 1-10 were debrided in length and thickness sharply with a nail nipper and  without incident. Pt will follow up in 9 weeks or sooner if any problems arise. Diagnosis was discussed with the pt and all of their questions were answered in detail. Proper foot hygiene and care was discussed with the pt. Patient to check feet daily and contact the office with any questions/problems/concerns. Other comorbidity noted and will be managed by PCP. Pain waiver discussed with patient and confirmed.    3/2/2022      Electronically signed by Josep Silveira DPM on 3/2/2022 at 11:12 AM  3/2/2022

## 2022-04-25 RX ORDER — ATORVASTATIN CALCIUM 20 MG/1
TABLET, FILM COATED ORAL
Qty: 90 TABLET | Refills: 1 | Status: SHIPPED | OUTPATIENT
Start: 2022-04-25 | End: 2022-10-24

## 2022-04-25 NOTE — TELEPHONE ENCOUNTER
Last visit: 10/5/21  Last Med refill: 11/1/21  Does patient have enough medication for 72 hours: Yes    Next Visit Date:  Future Appointments   Date Time Provider [place] Still   5/3/2022  1:00 PM TRES Caraballo CNP [facility]   5/11/2022  2:45 PM Lalo Delacruz DPM [place] [address] Maintenance   Topic Date Due    Pneumococcal 0-64 years Vaccine (1 - PCV) Never done    Diabetic retinal exam  Never done    Hepatitis C screen  Never done    Hepatitis B vaccine (1 of 3 - Risk 3-dose series) Never done    Diabetic foot exam  08/22/2020    Diabetic microalbuminuria test  05/22/2021    A1C test (Diabetic or Prediabetic)  04/26/2022    Depression Screen  04/26/2022    Lipids  08/16/2022    Potassium  08/16/2022    Creatinine  08/16/2022    Flu vaccine (Season Ended) 09/01/2022    Colorectal Cancer Screen  12/22/2024    DTaP/Tdap/Td vaccine (2 - Td or Tdap) 12/16/2027    COVID-19 Vaccine  Completed    HIV screen  Completed    Hepatitis A vaccine  Aged Out    Hib vaccine  Aged Out    Meningococcal (ACWY) vaccine  Aged Out       Hemoglobin A1C (%)   Date Value   04/26/2021 6.6   08/20/2020 7.4   05/22/2020 8.8             ( goal A1C is < 7)   No results found for: LABMICR  LDL Cholesterol (mg/dL)   Date Value   08/16/2021 51   06/03/2020 102       (goal LDL is <100)   AST (U/L)   Date Value   08/16/2021 19     ALT (U/L)   Date Value   08/16/2021 27     BUN (mg/dL)   Date Value   08/16/2021 10     BP Readings from Last 3 Encounters:   08/24/21 124/79   08/16/21 123/79   04/26/21 130/82          (goal 120/80)    All Future Testing planned in [facility]  Lab Frequency Next Occurrence               Patient Active Problem List:     Hypertension     Rectal bleeding     GERD (gastroesophageal reflux disease)     Dyslipidemia     Lumbar strain     Hematuria     Fracture of right orbital floor (HCC)     Right medial orbital wall fracture (HCC)     Exophthalmos of right eye Morbidly obese (HCC)     Lumbar radiculopathy     Polyneuropathy associated with underlying disease ([facility] [address]

## 2022-05-03 ENCOUNTER — OFFICE VISIT (OUTPATIENT)
Dept: FAMILY MEDICINE CLINIC | Age: 47
End: 2022-05-03
Payer: MEDICAID

## 2022-05-03 VITALS
BODY MASS INDEX: 39.66 KG/M2 | SYSTOLIC BLOOD PRESSURE: 122 MMHG | DIASTOLIC BLOOD PRESSURE: 80 MMHG | HEART RATE: 92 BPM | WEIGHT: 253.2 LBS | TEMPERATURE: 98.8 F | OXYGEN SATURATION: 98 %

## 2022-05-03 DIAGNOSIS — E11.42 TYPE 2 DIABETES MELLITUS WITH DIABETIC POLYNEUROPATHY, WITHOUT LONG-TERM CURRENT USE OF INSULIN (HCC): Primary | ICD-10-CM

## 2022-05-03 DIAGNOSIS — I10 PRIMARY HYPERTENSION: ICD-10-CM

## 2022-05-03 DIAGNOSIS — R20.0 NUMBNESS OF RIGHT THUMB: ICD-10-CM

## 2022-05-03 DIAGNOSIS — Z76.0 MEDICATION REFILL: ICD-10-CM

## 2022-05-03 LAB
CREATININE URINE POCT: 300
HBA1C MFR BLD: 7.9 %
MICROALBUMIN/CREAT 24H UR: 30 MG/G{CREAT}
MICROALBUMIN/CREAT UR-RTO: <30

## 2022-05-03 PROCEDURE — 82044 UR ALBUMIN SEMIQUANTITATIVE: CPT | Performed by: NURSE PRACTITIONER

## 2022-05-03 PROCEDURE — 1036F TOBACCO NON-USER: CPT | Performed by: NURSE PRACTITIONER

## 2022-05-03 PROCEDURE — 3046F HEMOGLOBIN A1C LEVEL >9.0%: CPT | Performed by: NURSE PRACTITIONER

## 2022-05-03 PROCEDURE — 2022F DILAT RTA XM EVC RTNOPTHY: CPT | Performed by: NURSE PRACTITIONER

## 2022-05-03 PROCEDURE — G8417 CALC BMI ABV UP PARAM F/U: HCPCS | Performed by: NURSE PRACTITIONER

## 2022-05-03 PROCEDURE — 83036 HEMOGLOBIN GLYCOSYLATED A1C: CPT | Performed by: NURSE PRACTITIONER

## 2022-05-03 PROCEDURE — G8427 DOCREV CUR MEDS BY ELIG CLIN: HCPCS | Performed by: NURSE PRACTITIONER

## 2022-05-03 PROCEDURE — 99214 OFFICE O/P EST MOD 30 MIN: CPT | Performed by: NURSE PRACTITIONER

## 2022-05-03 RX ORDER — LISINOPRIL AND HYDROCHLOROTHIAZIDE 12.5; 1 MG/1; MG/1
TABLET ORAL
Qty: 90 TABLET | Refills: 0 | Status: SHIPPED | OUTPATIENT
Start: 2022-05-03 | End: 2022-08-02 | Stop reason: SDUPTHER

## 2022-05-03 SDOH — ECONOMIC STABILITY: FOOD INSECURITY: WITHIN THE PAST 12 MONTHS, YOU WORRIED THAT YOUR FOOD WOULD RUN OUT BEFORE YOU GOT MONEY TO BUY MORE.: NEVER TRUE

## 2022-05-03 SDOH — ECONOMIC STABILITY: FOOD INSECURITY: WITHIN THE PAST 12 MONTHS, THE FOOD YOU BOUGHT JUST DIDN'T LAST AND YOU DIDN'T HAVE MONEY TO GET MORE.: NEVER TRUE

## 2022-05-03 ASSESSMENT — ENCOUNTER SYMPTOMS
COUGH: 0
SHORTNESS OF BREATH: 0

## 2022-05-03 ASSESSMENT — PATIENT HEALTH QUESTIONNAIRE - PHQ9
SUM OF ALL RESPONSES TO PHQ QUESTIONS 1-9: 0
2. FEELING DOWN, DEPRESSED OR HOPELESS: 0
SUM OF ALL RESPONSES TO PHQ9 QUESTIONS 1 & 2: 0
1. LITTLE INTEREST OR PLEASURE IN DOING THINGS: 0

## 2022-05-03 ASSESSMENT — SOCIAL DETERMINANTS OF HEALTH (SDOH): HOW HARD IS IT FOR YOU TO PAY FOR THE VERY BASICS LIKE FOOD, HOUSING, MEDICAL CARE, AND HEATING?: NOT VERY HARD

## 2022-05-03 NOTE — PROGRESS NOTES
Jose Carmichael (:  1975) is a 51 y.o. male,Established patient, here for evaluation of the following chief complaint(s):  Diabetes (f/u)         ASSESSMENT/PLAN:  1. Type 2 diabetes mellitus with diabetic polyneuropathy, without long-term current use of insulin (HCC)  -     POCT glycosylated hemoglobin (Hb A1C)  2. Medication refill  -     lisinopril-hydroCHLOROthiazide (PRINZIDE;ZESTORETIC) 10-12.5 MG per tablet; Take one tablet by mouth once daily, Disp-90 tablet, R-0Normal  3. Numbness of right thumb  -     Amy Morton MD, Orthopedic Surgery, [facility]  4. Primary hypertension  -     lisinopril-hydroCHLOROthiazide (PRINZIDE;ZESTORETIC) 10-12.5 MG per tablet; Take one tablet by mouth once daily, Disp-90 tablet, R-0Normal      No follow-ups on file. Subjective   SUBJECTIVE/OBJECTIVE:  HPI   Dm a1c is up to 7.9- has been eating out a lot. Ever Burnette feels he can get back on track with eating and exercise. He does not want to start a med at this time. Per last visit:    Still having thumb numbness. He did have an emg that showed: mild median neuropathy. He was given nocturnal splints which he reports he did wear. He states nothing has helped. He denies that he has ever had a steroid for this issue. We did give him a steroid, he states it did not help. Review of Systems   Constitutional: Negative for chills and fever. Respiratory: Negative for cough and shortness of breath. Cardiovascular: Negative for chest pain and leg swelling. Objective      Vitals:    22 1256   BP: 122/80   Pulse: 92   Temp: 98.8 °F (37.1 °C)   SpO2: 98%     Wt Readings from Last 3 Encounters:   22 253 lb 3.2 oz (114.9 kg)   22 253 lb (114.8 kg)   21 240 lb (108.9 kg)     Physical Exam  Constitutional:       General: He is not in acute distress. Appearance: He is well-developed. HENT:      Head: Normocephalic.       Right Ear: External ear normal.      Left Ear: External ear normal.   Cardiovascular:      Rate and Rhythm: Normal rate and regular rhythm. Heart sounds: Normal heart sounds. Pulmonary:      Effort: Pulmonary effort is normal.      Breath sounds: Normal breath sounds. Musculoskeletal:         General: Normal range of motion. Skin:     General: Skin is warm and dry. Neurological:      Mental Status: He is alert and oriented to person, place, and time. An electronic signature was used to authenticate this note.     --TRES Tamayo - CNP

## 2022-05-11 ENCOUNTER — OFFICE VISIT (OUTPATIENT)
Dept: PODIATRY | Age: 47
End: 2022-05-11
Payer: MEDICAID

## 2022-05-11 VITALS — HEIGHT: 67 IN | RESPIRATION RATE: 16 BRPM | WEIGHT: 253 LBS | BODY MASS INDEX: 39.71 KG/M2

## 2022-05-11 DIAGNOSIS — R26.2 DIFFICULTY WALKING: ICD-10-CM

## 2022-05-11 DIAGNOSIS — B35.1 DERMATOPHYTOSIS OF NAIL: ICD-10-CM

## 2022-05-11 DIAGNOSIS — D23.71 BENIGN NEOPLASM OF SKIN OF RIGHT FOOT: ICD-10-CM

## 2022-05-11 DIAGNOSIS — D23.72 BENIGN NEOPLASM OF SKIN OF LEFT FOOT: Primary | ICD-10-CM

## 2022-05-11 DIAGNOSIS — L85.3 XEROSIS CUTIS: ICD-10-CM

## 2022-05-11 PROCEDURE — 17110 DESTRUCTION B9 LES UP TO 14: CPT | Performed by: PODIATRIST

## 2022-05-11 PROCEDURE — 99213 OFFICE O/P EST LOW 20 MIN: CPT | Performed by: PODIATRIST

## 2022-05-11 PROCEDURE — G8417 CALC BMI ABV UP PARAM F/U: HCPCS | Performed by: PODIATRIST

## 2022-05-11 PROCEDURE — G8427 DOCREV CUR MEDS BY ELIG CLIN: HCPCS | Performed by: PODIATRIST

## 2022-05-11 PROCEDURE — 1036F TOBACCO NON-USER: CPT | Performed by: PODIATRIST

## 2022-05-11 PROCEDURE — 11721 DEBRIDE NAIL 6 OR MORE: CPT | Performed by: PODIATRIST

## 2022-05-11 RX ORDER — UREA 40 %
CREAM (GRAM) TOPICAL
Qty: 1 EACH | Refills: 2 | Status: SHIPPED | OUTPATIENT
Start: 2022-05-11

## 2022-05-11 NOTE — PROGRESS NOTES
[address] [facility] PODIATRY [facility]  [address] [facility] [address] [place] [address]  Dept: [phone]  Dept Fax: [phone]    DIABETIC PROGRESS NOTE  Date of patient's visit: 5/11/2022  Patient's Name:  Ervin Boas YOB: 1975            Patient Care Team:  TRES Sotelo CNP as PCP - General (Certified Nurse Practitioner)  TRES Sotelo CNP as PCP - [facility]  Katy Mena MD as Consulting Physician (Ophthalmology)  Jake Montez DPM as Physician (Podiatry)          Chief Complaint   Patient presents with    Nail Problem     toenail trim    Benign Neoplasm     bilaterally    Foot Pain    Diabetes    Peripheral Neuropathy       Subjective:   Ervin Boas comes to clinic for Nail Problem (toenail trim), Benign Neoplasm (bilaterally), Foot Pain, Diabetes, and Peripheral Neuropathy    he is a diabetic and states that he is doing well. Pt currently has complaint of thickened, elongated nails that they cannot manage by themselves. Pt's primary care physician is TRES Linares CNP last seen 05/03/2022   Pt's last blood sugar was unknon. Pt has a new complaint of increase painful skin lesions with thick skin. Pt has tried changing shoes but it has not helped the pain       Lab Results   Component Value Date    LABA1C 7.9 05/03/2022      Complains of numbness in the feet bilat.   Past Medical History:   Diagnosis Date    Dry skin     GERD (gastroesophageal reflux disease)     Hematuria     HLD (hyperlipidemia) 3/2/2015    Hypertension     AIDEN (obstructive sleep apnea)     Pre-diabetes     Sleep apnea     does not use a machine    Wears dentures     upper       No Known Allergies  Current Outpatient Medications on File Prior to Visit   Medication Sig Dispense Refill    lisinopril-hydroCHLOROthiazide (PRINZIDE;ZESTORETIC) 10-12.5 MG per tablet Take one tablet by mouth once daily 90 tablet 0    atorvastatin (LIPITOR) 20 MG tablet take 1 tablet by mouth once daily 90 tablet 1    Urea (CARMOL) 40 % cream Apply to affected area once daily 1 each 3    urea (UREA 20 INTENSIVE HYDRATING) 20 % cream apply to FEET twice a day 85 g 3    Lancets MISC 1 each by Does not apply route daily 300 each 1    blood glucose monitor strips Test 1 time a day & as needed for symptoms of irregular blood glucose. Dispense sufficient amount for indicated testing frequency plus additional to accommodate PRN testing needs. 300 strip 1    nystatin (MYCOSTATIN) 201626 UNIT/GM powder Apply 2 times daily. 1 Bottle 2    Urea (CARMOL) 40 % cream Apply to feet bid 1 Tube 3    Blood Glucose Monitoring Suppl (ACURA BLOOD GLUCOSE METER) w/Device KIT 1 Device by Does not apply route once for 1 dose 1 kit 0     No current facility-administered medications on file prior to visit. Review of Systems    Review of Systems:   History obtained from chart review and the patient  General ROS: negative for - chills, fatigue, fever, night sweats or weight gain  Constitutional: Negative for chills, diaphoresis, fatigue, fever and unexpected weight change. Musculoskeletal: Positive for arthralgias, gait problem and joint swelling. Neurological ROS: negative for - behavioral changes, confusion, headaches or seizures. Negative for weakness and numbness. Dermatological ROS: negative for - mole changes, rash  Cardiovascular: Negative for leg swelling. Gastrointestinal: Negative for constipation, diarrhea, nausea and vomiting. Objective:  Dermatologic Exam:  Soft tissue lesion to the plantar right and left foot with central core and petechiae. Pain on palpation of lesion. Skin No rashes or nodules noted. .   Skin is thin, with flaky sloughing skin as well as decreased hair growth to the lower leg  Small red hemosiderin deposits seen dorsal foot   Musculoskeletal:     1st MPJ ROM decreased, Bilateral.  Muscle strength 5/5, Bilateral.  Pain present upon palpation of toenails 1-5, Bilateral. decreased medial longitudinal arch, Bilateral.  Ankle ROM decreased,Bilateral.    Dorsally contracted digits present digits 2, Bilateral.     Vascular: DP pulses 1/4 bilateral.  PT pulses 0/4 bilateral.   CFT <5 seconds, Bilateral.  Hair growth absent to the level of the digits, Bilateral.  Edema present, Bilateral.  Varicosities absent, Bilateral. Erythema absent, Bilateral    Neurological: Sensation diminshed to light touch to level of digits, Bilateral.  Protective sensation intact 6/10 sites via 5.07/10g [place]-Zoe Monofilament, Bilateral.  negative Tinel's, Bilateral.  negative Valleix sign, Bilateral.      Integument: Warm, dry, supple, Bilateral.  Open lesion absent, Bilateral.  Interdigital maceration absent to web spaces 4, Bilateral.  Nails 1-5 left and 1-5 right thickened > 3.0 mm, dystrophic and crumbly, discolored with subungual debris. Fissures absent, Bilateral.   General: AAO x 3 in NAD.     Derm  Toenail Description  Sites of Onychomycosis Involvement (Check affected area)  [x] [x] [x] [x] [x] [x] [x] [x] [x] [x]  5 4 3 2 1 1 2 3 4 5                          Right                                        Left    Thickness  [x] [x] [x] [x] [x] [x] [x] [x] [x] [x]  5 4 3 2 1 1 2 3 4 5                         Right                                        Left    Dystrophic Changes   [x] [x] [x] [x] [x] [x] [x] [x] [x] [x]  5 4 3 2 1 1 2 3 4 5                         Right                                        Left    Color   [x] [x] [x] [x] [x] [x] [x] [x] [x] [x]  5 4 3 2 1 1 2 3 4 5                          Right                                        Left    Incurvation/Ingrowin   [] [] [] [] [] [] [] [] [] []  5 4 3 2 1 1 2 3 4 5                         Right                                        Left    Inflammation/Pain   [x] [x] [x] [x] [x] [x] [x] [x] [x] [x]  5 4 3 2 1 1 2 3 4 5                         Right Left        Visual inspection:  Deformity: hammertoe deformity jhonny feet  amputation: absent  Skin lesions: as above  Edema: right- 2+ pitting edema, left- 2+ pitting edema    Sensory exam:  Monofilament sensation: abnormal - 6/10 via SW 5.07/10g monofilament to the plantar foot bilateral feet    Pulses: abnormal - 1/4 dorsalis pedis pulse and 0/4 Posterior tibial pulse,   Pinprick: Impaired  Proprioception: Impaired  Vibration (128 Hz): Impaired       DM with PVD       [x]Yes    []No      Assessment:  51 y.o. male with:   Diagnosis Orders   1. Benign neoplasm of skin of left foot  29503 - [place] DESTRUCTION BENIGN LESIONS UP TO 14    HM DIABETES FOOT EXAM   2. Benign neoplasm of skin of right foot  36161 - [place] DESTRUCTION BENIGN LESIONS UP TO 14    HM DIABETES FOOT EXAM   3. Difficulty walking  03491 - [place] DEBRIDEMENT OF NAILS, 6 OR MORE    05829 - [place] DESTRUCTION BENIGN LESIONS UP TO 14    HM DIABETES FOOT EXAM   4. Xerosis cutis  HM DIABETES FOOT EXAM    Urea (CARMOL) 40 % cream   5. Dermatophytosis of nail  86018 - [place] DEBRIDEMENT OF NAILS, 6 OR MORE    HM DIABETES FOOT EXAM           Q7   []Yes    []No                Q8   [x]Yes    []No                     Q9   []Yes    []No    Plan:   Pt was evaluated and examined. Patient was given personalized discharge instructions. The lesions were partially excised via 15 blade and silver nitrate was applied under occlusion. The patient tolerated the procedure well and without complication. Advised patient to use vasoline to the area after tomorrow to prevent surrounding tissue irritation. To address xerosis, patient to apply urea cream to feet daily. Pt to monitor for fissures due to dryness. Advised pt to contact office is there are any open lesions. Nails 1-10 were debrided sharply in length and thickness with a nipper and , without incident. Pt will follow up in 9 weeks or sooner if any problems arise.  Diagnosis was discussed with the pt and all of their questions were answered in detail. Proper foot hygiene and care was discussed with the pt. Informed patient on proper diabetic foot care and importance of tight glycemic control. Patient to check feet daily and contact the office with any questions/problems/concerns.    Other comorbidity noted and will be managed by PCP.  5/11/2022    Electronically signed by Tonya Muir DPM on 5/11/2022 at 2:54 PM  5/11/2022

## 2022-05-16 ENCOUNTER — OFFICE VISIT (OUTPATIENT)
Dept: ORTHOPEDIC SURGERY | Age: 47
End: 2022-05-16
Payer: MEDICAID

## 2022-05-16 VITALS — BODY MASS INDEX: 39.71 KG/M2 | HEIGHT: 67 IN | WEIGHT: 253 LBS

## 2022-05-16 DIAGNOSIS — R20.2 TINGLING OF RIGHT UPPER EXTREMITY: ICD-10-CM

## 2022-05-16 DIAGNOSIS — M79.644 PAIN OF RIGHT THUMB: Primary | ICD-10-CM

## 2022-05-16 PROCEDURE — 99204 OFFICE O/P NEW MOD 45 MIN: CPT | Performed by: PHYSICIAN ASSISTANT

## 2022-05-16 PROCEDURE — G8417 CALC BMI ABV UP PARAM F/U: HCPCS | Performed by: PHYSICIAN ASSISTANT

## 2022-05-16 PROCEDURE — G8427 DOCREV CUR MEDS BY ELIG CLIN: HCPCS | Performed by: PHYSICIAN ASSISTANT

## 2022-05-17 ASSESSMENT — ENCOUNTER SYMPTOMS
SHORTNESS OF BREATH: 0
COLOR CHANGE: 0
VOMITING: 0
COUGH: 0

## 2022-05-18 NOTE — PROGRESS NOTES
[address] [facility] ORTHO SPECIALISTS  [facility] [address]  Dept: [phone]    Ambulatory Orthopedic Consult      CHIEF COMPLAINT:    Chief Complaint   Patient presents with    New Patient     right thumb pain       HISTORY OF PRESENT ILLNESS:      The patient is a 51 y.o. male who is being seen at the request of  TRES Olivarez CNP for consultation and evaluation of  Right thumb numbness and tingling. He notes he has had tingling in the right thumb over the last 1 year. He denies trauma or injury to the right thumb. He denies pain within the right thumb. He does note some weakness with  strength on the right hand. He denies prior injections, physical therapy or surgery on the right hand. The patient is a Diabetic with a last documented HgbA1c of 7.9 on 5/3/2022.       Past Medical History:    Past Medical History:   Diagnosis Date    Dry skin     GERD (gastroesophageal reflux disease)     Hematuria     HLD (hyperlipidemia) 3/2/2015    Hypertension     AIDEN (obstructive sleep apnea)     Pre-diabetes     Sleep apnea     does not use a machine    Wears dentures     upper       Past Surgical History:    Past Surgical History:   Procedure Laterality Date    ANKLE SURGERY Left     fracture    ARTHROPLASTY      4th and 5th toe on left foot    COLONOSCOPY  12/22/2014    normal    CYSTOSCOPY  03/21/2017    retrograde    CYSTOSCOPY Bilateral 3/21/2017    CYSTOSCOPY, RETROGRADE PYELOGRAM performed by Chiara Brenner MD at [address] Left 03/03/2017    left foot 5th digit derotational arthroplasty       Current Medications:   Current Outpatient Medications   Medication Sig Dispense Refill    Urea (CARMOL) 40 % cream Apply to feet bid 1 each 2    lisinopril-hydroCHLOROthiazide (PRINZIDE;ZESTORETIC) 10-12.5 MG per tablet Take one tablet by mouth once daily 90 tablet 0    atorvastatin (LIPITOR) 20 MG tablet take 1 tablet by mouth once daily 90 tablet 1    Urea (CARMOL) 40 % cream Apply to affected area once daily 1 each 3    urea (UREA 20 INTENSIVE HYDRATING) 20 % cream apply to FEET twice a day 85 g 3    Lancets MISC 1 each by Does not apply route daily 300 each 1    blood glucose monitor strips Test 1 time a day & as needed for symptoms of irregular blood glucose. Dispense sufficient amount for indicated testing frequency plus additional to accommodate PRN testing needs. 300 strip 1    nystatin (MYCOSTATIN) 963565 UNIT/GM powder Apply 2 times daily. 1 Bottle 2    Blood Glucose Monitoring Suppl (ACURA BLOOD GLUCOSE METER) w/Device KIT 1 Device by Does not apply route once for 1 dose 1 kit 0     No current facility-administered medications for this visit. Allergies:    Patient has no known allergies. Social History:   Social History     Socioeconomic History    Marital status: Single     Spouse name: Not on file    Number of children: Not on file    Years of education: Not on file    Highest education level: Not on file   Occupational History    Not on file   Tobacco Use    Smoking status: Former Smoker     Types: Cigars     Quit date: 2019     Years since quittin.3    Smokeless tobacco: Never Used    Tobacco comment: occassionally   Substance and Sexual Activity    Alcohol use: Yes     Alcohol/week: 4.2 standard drinks     Types: 5 Standard drinks or equivalent per week     Comment: weekends/ 6 drinks    Drug use: No    Sexual activity: Not on file   Other Topics Concern    Not on file   Social History Narrative    Not on file     Social Determinants of Health     Financial Resource Strain: Low Risk     Difficulty of Paying Living Expenses: Not very hard   Food Insecurity: No Food Insecurity    Worried About Running Out of Food in the Last Year: Never true    Stacey of Food in the Last Year: Never true   Transportation Needs:     Lack of Transportation (Medical):  Not on file  Lack of Transportation (Non-Medical): Not on file   Physical Activity:     Days of Exercise per Week: Not on file    Minutes of Exercise per Session: Not on file   Stress:     Feeling of Stress : Not on file   Social Connections:     Frequency of Communication with Friends and Family: Not on file    Frequency of Social Gatherings with Friends and Family: Not on file    Attends Jew Services: Not on file    Active Member of [address] [place] [address] or Organizations: Not on file    Attends Club or Organization Meetings: Not on file    Marital Status: Not on file   Intimate Partner Violence:     Fear of Current or Ex-Partner: Not on file    Emotionally Abused: Not on file    Physically Abused: Not on file    Sexually Abused: Not on file   Housing Stability:     Unable to Pay for Housing in the Last Year: Not on file    Number of [place] in the Last Year: Not on file    Unstable Housing in the Last Year: Not on file       Family History:  Family History   Problem Relation Age of Onset    High Blood Pressure Mother     Diabetes Father          REVIEW OF SYSTEMS:  Review of Systems   Constitutional: Negative for activity change and fever. HENT: Negative for sneezing. Respiratory: Negative for cough and shortness of breath. Cardiovascular: Negative for chest pain. Gastrointestinal: Negative for vomiting. Musculoskeletal: Negative for arthralgias, joint swelling and myalgias. Skin: Negative for color change. Neurological: Positive for weakness (right hand) and numbness (right thumb). Psychiatric/Behavioral: Negative for sleep disturbance. PHYSICAL EXAM:  Ht 5' 7\" (1.702 m)   Wt 253 lb (114.8 kg)   BMI 39.63 kg/m²  Body mass index is 39.63 kg/m². Physical Exam  Gen: alert and oriented to person and place. Psych:  Appropriate affect; Appropriate knowledge base; Appropriate mood; No hallucinations;    Head: normocephalic, atraumatic   Chest: symmetric chest excursion; nonlabored respiratory effort. Pelvis: stable; no obvious pelvis deformity  Ortho Exam  Extremity:  Inspection of the Right wrist and hand reveals no significant outward deformity. No thenar atrophy is noted. Patient has full AROM of the Right wrist and fingers. Sensation is diminished to the palmar aspect of the right thumb, but intact grossly to the remainder of the median, ulnar and radial nerve distributions. Tinels at the Right  cubital tunnel is noted to be Negative. Radial pulse is 2+ and symmetric bilaterally. The patient has full ROM of the Right elbow, shoulder, and cervical spine. Esvin's sign is negative on the right hand. Radiology:   XR HAND RIGHT (MIN 3 VIEWS)    Result Date: 5/17/2022  History: Right thumb pain. Findings: AP, lateral, oblique images of the right hand obtained today in office reveals no acute osseous abnormality. Evidence of prior ring finger proximal phalanx fracture well-healed with malunion appreciated. No further evidence of fracture, subluxation, dislocation, radiopaque foreign body or radiopaque tumors noted within the right hand. Impression: Normal right hand x-ray with evidence of prior ring finger proximal phalanx fracture healed with malunion as described above. ASSESSMENT:     1. Pain of right thumb    2. Tingling of right upper extremity         PLAN:       Today in office we discussed etiology and natural history of numbness in the right thumb with suspicion for carpal tunnel syndrome on the right hand. I personally reviewed the patient's x-rays from today revealing no acute osseous abnormality, but evidence of prior right ring finger proximal phalanx fracture that healed with malunion. The treatment options may include activity modification, oral anti-inflammatories, bracing, injections, advanced imaging, physical therapy and/or surgical intervention. The patient would like to proceed with:  1.  EMG of the right upper extremity with paracervical

## 2022-05-30 DIAGNOSIS — I10 PRIMARY HYPERTENSION: ICD-10-CM

## 2022-05-30 DIAGNOSIS — Z76.0 MEDICATION REFILL: ICD-10-CM

## 2022-05-31 RX ORDER — LISINOPRIL AND HYDROCHLOROTHIAZIDE 12.5; 1 MG/1; MG/1
TABLET ORAL
Qty: 90 TABLET | Refills: 0 | OUTPATIENT
Start: 2022-05-31

## 2022-06-14 NOTE — PROGRESS NOTES
[facility]  [facility] UROLOGY SPECIALISTS - [place]  [address]  [address]  [address] [facility] [address]  Dept: [address] [facility] Urology Office Note - Established    Patient:  Brandie Houston  YOB: 1975  Date: 6/18/2019    The patient is a 39 y.o. male who presents todayfor evaluation of the following problems:   Chief Complaint   Patient presents with    Urinary Frequency     3 mnth follow up       HPI  Here for follow up on Flomax. He thinks Flomax has helped some, continues with frequency, has a split stream at times. Has post void dribble which is bothersome to him. No hematuria, no UTI. Summary of old records: N/A    Additional History: N/A    Procedures Today: N/A    Urinalysis today:  No results found for this visit on 06/18/19.   Last several PSA's:  Lab Results   Component Value Date    PSA 0.50 07/25/2017     Last total testosterone:  No results found for: TESTOSTERONE    AUA Symptom Score (6/18/2019):  INCOMPLETE EMPTYING: How often have you had the sensation of not emptying your bladder?: Less than Half the time  FREQUENCY: How often do you have to urinate less than every two hours?: More than Half the time  INTERMITTENCY: How often have you found you stopped and started again several times when you urinated?: Less than 1 to 5 times  URGENCY: How often have you found it difficult to postpone urination?: Not at all  WEAK STREAM: How often have you had a weak urinary stream?: Less than Half the time  STRAINING: How often have you had to strain to start  urination?: Not at all  NOCTURIA: How many times did you typically get up at night to uriniate?: 2 Times  TOTAL I-PSS SCORE[de-identified] 11  How would you feel if you were to spend the rest of your life with your urinary condition?: Mixe    Last BUN and creatinine:  Lab Results   Component Value Date    BUN 12 02/26/2019     Lab Results   Component Value Date    CREATININE 0.81 02/26/2019       Additional Lab/Culture results: none    Imaging Reviewed during this Office Visit: none  (results were independently reviewed by physician and radiology report verified)    PAST MEDICAL, FAMILY AND SOCIAL HISTORY UPDATE:  Past Medical History:   Diagnosis Date    Dry skin     GERD (gastroesophageal reflux disease)     Hematuria     HLD (hyperlipidemia) 3/2/2015    Hypertension     AIDEN (obstructive sleep apnea)     Pre-diabetes     Sleep apnea     does not use a machine    Wears dentures     upper     Past Surgical History:   Procedure Laterality Date    ANKLE SURGERY Left     fracture    ARTHROPLASTY      4th and 5th toe on left foot    COLONOSCOPY  12/22/2014    normal    CYSTOSCOPY  03/21/2017    retrograde    CYSTOSCOPY Bilateral 3/21/2017    CYSTOSCOPY, RETROGRADE PYELOGRAM performed by Kristin Munoz MD at [place] [address]. Left 03/03/2017    left foot 5th digit derotational arthroplasty     Family History   Problem Relation Age of Onset    High Blood Pressure Mother     Diabetes Father      Outpatient Medications Marked as Taking for the 6/18/19 encounter (Office Visit) with Kristin Munoz MD   Medication Sig Dispense Refill    potassium bicarbonate (K-EFFERVESCENT) 25 MEQ disintegrating tablet dissolve 2 tablets INTO 4 TO 8 OUNCES OF WATER DAILY. 60 tablet 5    lisinopril-hydrochlorothiazide (PRINZIDE;ZESTORETIC) 10-12.5 MG per tablet take 1 tablet by mouth once daily 30 tablet 5    betamethasone dipropionate (DIPROLENE) 0.05 % ointment Apply topically daily. 1 Tube 0    hydrochlorothiazide (MICROZIDE) 12.5 MG capsule Take by mouth      tamsulosin (FLOMAX) 0.4 MG capsule Take 1 capsule by mouth daily 90 capsule 3    ammonium lactate (LAC-HYDRIN) 12 % cream Apply topically as needed. 1 Bottle 4    omeprazole (PRILOSEC) 20 MG delayed release capsule Take one tablet daily for acid indigestion and heartburn 30 capsule 5       Patient has no known allergies.   Social History     Tobacco Use   Smoking Status Current Some Day Smoker    Types: Cigars   Smokeless Tobacco Never Used   Tobacco Comment    occassionally     (Ifpatient a smoker, smoking cessation counseling offered)    Social History     Substance and Sexual Activity   Alcohol Use Yes    Alcohol/week: 2.5 oz    Types: 5 Standard drinks or equivalent per week    Comment: weekends/ 6 drinks       REVIEW OF SYSTEMS:  Review of Systems    Physical Exam:      Vitals:    06/18/19 1314   BP: 123/86   Pulse: 83   Temp: 98.1 °F (36.7 °C)     Body mass index is 34.61 kg/m². Patient is a 39 y.o. male in no acute distress and alert and oriented to person, place and time. Physical Exam  Constitutional: Patient in no acute distress. Neuro: Alert and oriented to person, place and time. Psych: Mood normal, affect normal  Skin: No rash noted  HEENT: Head: Normocephalic andatraumatic  Conjunctivae and EOM are normal. Pupils are equal, round  Nose:Normal  Right External Ear: Normal; Left External Ear: Normal  Mouth: Mucosa Moist  Neck: Supple  Lungs: Respiratory effort is normal  Cardiovascular: Warm & Pink  Abdomen: Soft, non-tender, non-distended with no CVA,  No flank tenderness,  Or hepatosplenomegaly   Lymphatics: No palpablelymphadenopathy. Assessment and Plan      1. Hypertrophy of prostate with urinary obstruction    2. Split of urinary stream    3. Nocturia           Plan:     cysto rule out stricture  Return for Cystoscopy. Prescriptions Ordered:  No orders of the defined types were placed in this encounter. Orders Placed:  No orders of the defined types were placed in this encounter. Christophe Gutierrez MD    Agree with the ROS entered by the MA. Yes - the patient is able to be screened

## 2022-07-13 ENCOUNTER — OFFICE VISIT (OUTPATIENT)
Dept: PODIATRY | Age: 47
End: 2022-07-13
Payer: MEDICAID

## 2022-07-13 VITALS — HEIGHT: 67 IN | WEIGHT: 253 LBS | BODY MASS INDEX: 39.71 KG/M2

## 2022-07-13 DIAGNOSIS — M79.605 PAIN IN BOTH LOWER EXTREMITIES: ICD-10-CM

## 2022-07-13 DIAGNOSIS — D23.71 BENIGN NEOPLASM OF SKIN OF RIGHT FOOT: ICD-10-CM

## 2022-07-13 DIAGNOSIS — M79.604 PAIN IN BOTH LOWER EXTREMITIES: ICD-10-CM

## 2022-07-13 DIAGNOSIS — L85.3 XEROSIS CUTIS: ICD-10-CM

## 2022-07-13 DIAGNOSIS — B35.1 DERMATOPHYTOSIS OF NAIL: ICD-10-CM

## 2022-07-13 DIAGNOSIS — L84 HELOMA MOLLE: ICD-10-CM

## 2022-07-13 DIAGNOSIS — D23.72 BENIGN NEOPLASM OF SKIN OF LEFT FOOT: Primary | ICD-10-CM

## 2022-07-13 PROCEDURE — 11721 DEBRIDE NAIL 6 OR MORE: CPT | Performed by: PODIATRIST

## 2022-07-13 PROCEDURE — G8417 CALC BMI ABV UP PARAM F/U: HCPCS | Performed by: PODIATRIST

## 2022-07-13 PROCEDURE — 17110 DESTRUCTION B9 LES UP TO 14: CPT | Performed by: PODIATRIST

## 2022-07-13 PROCEDURE — 1036F TOBACCO NON-USER: CPT | Performed by: PODIATRIST

## 2022-07-13 PROCEDURE — 99213 OFFICE O/P EST LOW 20 MIN: CPT | Performed by: PODIATRIST

## 2022-07-13 PROCEDURE — G8427 DOCREV CUR MEDS BY ELIG CLIN: HCPCS | Performed by: PODIATRIST

## 2022-07-13 NOTE — PROGRESS NOTES
[address] [facility] PODIATRY [facility]  [address] [address] [place] [address]  Dept: [phone]  Dept Fax: [phone]     FOOT PAIN & BENIGN NEOPLASM PROGRESS NOTE  Date of patient's visit: 7/13/2022  Patient's Name:  Naomie Gomez YOB: 1975            Patient Care Team:  TRES Christian CNP as PCP - General (Certified Nurse Practitioner)  TRES Christian CNP as PCP - [facility]  Iwona Ceja MD as Consulting Physician (Ophthalmology)  Best Curry DPM as Physician (Podiatry)          Chief Complaint   Patient presents with    Foot Pain     bilateral foot    Nail Problem     toenail trim    Benign Neoplasm     bilateral foot       Subjective: This Naomie Gomez comes to clinic for foot and nail care. Pt currently has complaint of thickened, painful, elongated nails that he/she cannot manage by themselves. Pt. Relates pain to nails with shoe gear. Pt's primary care physician is Som Richards seen  5/30/22. Past Medical History:   Diagnosis Date    Dry skin     GERD (gastroesophageal reflux disease)     Hematuria     HLD (hyperlipidemia) 3/2/2015    Hypertension     AIDEN (obstructive sleep apnea)     Pre-diabetes     Sleep apnea     does not use a machine    Wears dentures     upper     Pt has a new complaint of increased painful skin lesion to jhonny feet and new skin lesion to right 4th webspace.   Pt has tried changing shoes but it has not helped the pain        No Known Allergies  Current Outpatient Medications on File Prior to Visit   Medication Sig Dispense Refill    Urea (CARMOL) 40 % cream Apply to feet bid 1 each 2    lisinopril-hydroCHLOROthiazide (PRINZIDE;ZESTORETIC) 10-12.5 MG per tablet Take one tablet by mouth once daily 90 tablet 0    atorvastatin (LIPITOR) 20 MG tablet take 1 tablet by mouth once daily 90 tablet 1    Urea (CARMOL) 40 % cream Apply to affected area once daily 1 each 3    urea (UREA 20 INTENSIVE HYDRATING) 20 % cream apply to FEET twice a day 85 g 3    Lancets MISC 1 each by Does not apply route daily 300 each 1    blood glucose monitor strips Test 1 time a day & as needed for symptoms of irregular blood glucose. Dispense sufficient amount for indicated testing frequency plus additional to accommodate PRN testing needs. 300 strip 1    nystatin (MYCOSTATIN) 842453 UNIT/GM powder Apply 2 times daily. 1 Bottle 2    Blood Glucose Monitoring Suppl (ACURA BLOOD GLUCOSE METER) w/Device KIT 1 Device by Does not apply route once for 1 dose 1 kit 0     No current facility-administered medications on file prior to visit. Review of Systems    Review of Systems:   History obtained from chart review and the patient  General ROS: negative for - chills, fatigue, fever, night sweats or weight gain  Constitutional: Negative for chills, diaphoresis, fatigue, fever and unexpected weight change. Musculoskeletal: Positive for arthralgias, gait problem and joint swelling. Neurological ROS: negative for - behavioral changes, confusion, headaches or seizures. Negative for weakness and numbness. Dermatological ROS: negative for - mole changes, rash  Cardiovascular: Negative for leg swelling. Gastrointestinal: Negative for constipation, diarrhea, nausea and vomiting. Objective:  Dermatologic Exam:  Soft tissue lesion to the plantar right and left foot and right 4th interspaced with central core and petechiae. Pain on palpation of lesion. Skin No rashes or nodules noted. .   Skin is thin, with flaky sloughing skin as well as decreased hair growth to the lower leg  Small red hemosiderin deposits seen dorsal foot   Musculoskeletal:     1st MPJ ROM decreased, Bilateral.  Muscle strength 5/5, Bilateral.  Pain present upon palpation of toenails 1-5, Bilateral. decreased medial longitudinal arch, Bilateral.  Ankle ROM decreased,Bilateral.    Dorsally contracted digits present digits 2, Bilateral.     Vascular: DP pulses 1/4 bilateral.  PT pulses 0/4 bilateral.   CFT <5 seconds, Bilateral.  Hair growth absent to the level of the digits, Bilateral.  Edema present, Bilateral.  Varicosities absent, Bilateral. Erythema absent, Bilateral    Neurological: Sensation diminshed to light touch to level of digits, Bilateral.  Protective sensation intact 6/10 sites via 5.07/10g [place]oe Monofilament, Bilateral.  negative Tinel's, Bilateral.  negative Valleix sign, Bilateral.      Integument: Warm, dry, supple, Bilateral.  Open lesion absent, Bilateral.  Interdigital maceration absent to web spaces 4, Bilateral.  Nails 1-5 left and 1-5 right thickened > 3.0 mm, dystrophic and crumbly, discolored with subungual debris. Fissures absent, Bilateral.   General: AAO x 3 in NAD. Derm  Toenail Description  Sites of Onychomycosis Involvement (Check affected area)  [x] [x] [x] [x] [x] [x] [x] [x] [x] [x]  5 4 3 2 1 1 2 3 4 5                          Right                                        Left    Thickness  [x] [x] [x] [x] [x] [x] [x] [x] [x] [x]  5 4 3 2 1 1 2 3 4 5                         Right                                        Left    Dystrophic Changes   [x] [x] [x] [x] [x] [x] [x] [x] [x] [x]  5 4 3 2 1 1 2 3 4 5                         Right                                        Left    Color  [x] [x] [x] [x] [x] [x] [x] [x] [x] [x]  5 4 3 2 1 1 2 3 4 5                          Right                                        Left    Incurvation/Ingrowin   [] [] [] [] [] [] [] [] [] []  5 4 3 2 1 1 2 3 4 5                         Right                                        Left    Inflammation/Pain   [x] [x] [x] [x] [x] [x] [x] [x] [x] [x]  5 4 3  2 1 1 2 3 4 5                         Right                                        Left       Nails are painful 1-10 with direct palpation.       Q7   []Yes  []No                Q8   [x]Yes  []No Q9   []Yes    []No    Assessment:  51 y.o. male with:    Diagnosis Orders   1. Benign neoplasm of skin of left foot  36942 - [place] DESTRUCTION BENIGN LESIONS UP TO 14      2. Benign neoplasm of skin of right foot  74497 - [place] DESTRUCTION BENIGN LESIONS UP TO 14      3. Xerosis cutis        4. Dermatophytosis of nail  26333 - [place] DEBRIDEMENT OF NAILS, 6 OR MORE      5. Pain in both lower extremities  17819 - [place] DEBRIDEMENT OF NAILS, 6 OR MORE    55331 - [place] DESTRUCTION BENIGN LESIONS UP TO 14      6. Heloma molle  83129 - [place] DEBRIDEMENT OF NAILS, 6 OR MORE    06956 - [place] DESTRUCTION BENIGN LESIONS UP TO 14              Plan:   Pt was evaluated and examined. Patient was given personalized discharge instructions. Nails 1-10 were debrided in length and thickness sharply with a nail nipper and  without incident. Diagnosis was discussed with the pt and all of their questions were answered in detail. Proper foot hygiene and care was discussed with the pt. Patient to check feet daily and contact the office with any questions/problems/concerns. Other comorbidity noted and will be managed by PCP. Pain waiver discussed with patient and confirmed. The lesion was partially debrided and silver nitrate was applied with an apperature pad under occlusion. The patient will leave in place for 24-48 hours and than remove. The patient tolerated the procedure well and without complication.    Pt will follow up in 9 weeks         Electronically signed by Anahi Bills DPM on 7/13/2022 at 2:41 PM  7/13/2022

## 2022-07-13 NOTE — PATIENT INSTRUCTIONS
Schedule a Vaccine  When you qualify to receive the vaccine, call the [facility]) COVID-19 Vaccination Hotline to schedule your appointment or to get additional information about the [facility]) locations which are offering the COVID-19 vaccine. To be 94% effective, it's important that you receive two doses of one of the COVID-19 vaccines. -If you are receiving the [place] vaccine, your second shot will be scheduled as close to 21 days after the first shot as possible. -If you are receiving the Moderna vaccine, your second shot will be scheduled as close to 28 days after the first shot as possible. [facility]) COVID-19 Vaccination Hotline: [phone]    Links to [facility]) website and [facility] website:    [facility]-[email or web address]-[email or web address]    [email or web address]

## 2022-08-02 ENCOUNTER — OFFICE VISIT (OUTPATIENT)
Dept: FAMILY MEDICINE CLINIC | Age: 47
End: 2022-08-02
Payer: MEDICAID

## 2022-08-02 ENCOUNTER — HOSPITAL ENCOUNTER (OUTPATIENT)
Age: 47
Setting detail: SPECIMEN
Discharge: HOME | End: 2022-08-02

## 2022-08-02 VITALS
SYSTOLIC BLOOD PRESSURE: 132 MMHG | HEART RATE: 86 BPM | OXYGEN SATURATION: 97 % | DIASTOLIC BLOOD PRESSURE: 82 MMHG | WEIGHT: 243 LBS | BODY MASS INDEX: 38.06 KG/M2

## 2022-08-02 DIAGNOSIS — Z76.0 MEDICATION REFILL: ICD-10-CM

## 2022-08-02 DIAGNOSIS — Z12.5 SCREENING FOR MALIGNANT NEOPLASM OF PROSTATE: ICD-10-CM

## 2022-08-02 DIAGNOSIS — E11.42 TYPE 2 DIABETES MELLITUS WITH DIABETIC POLYNEUROPATHY, WITHOUT LONG-TERM CURRENT USE OF INSULIN (HCC): Primary | ICD-10-CM

## 2022-08-02 DIAGNOSIS — E11.42 TYPE 2 DIABETES MELLITUS WITH DIABETIC POLYNEUROPATHY, WITHOUT LONG-TERM CURRENT USE OF INSULIN (HCC): ICD-10-CM

## 2022-08-02 DIAGNOSIS — I10 PRIMARY HYPERTENSION: ICD-10-CM

## 2022-08-02 LAB
ALBUMIN SERPL-MCNC: 4.7 G/DL (ref 3.5–5.2)
ALBUMIN/GLOBULIN RATIO: 1.5 (ref 1–2.5)
ALP BLD-CCNC: 76 U/L (ref 40–129)
ALT SERPL-CCNC: 37 U/L (ref 5–41)
ANION GAP SERPL CALCULATED.3IONS-SCNC: 20 MMOL/L (ref 9–17)
AST SERPL-CCNC: 28 U/L
BILIRUB SERPL-MCNC: 0.49 MG/DL (ref 0.3–1.2)
BUN BLDV-MCNC: 16 MG/DL (ref 6–20)
CALCIUM SERPL-MCNC: 9.7 MG/DL (ref 8.6–10.4)
CHLORIDE BLD-SCNC: 100 MMOL/L (ref 98–107)
CHOLESTEROL/HDL RATIO: 2.8
CHOLESTEROL: 99 MG/DL
CO2: 21 MMOL/L (ref 20–31)
CREAT SERPL-MCNC: 0.95 MG/DL (ref 0.7–1.2)
GFR AFRICAN AMERICAN: >60 ML/MIN
GFR NON-AFRICAN AMERICAN: >60 ML/MIN
GFR SERPL CREATININE-BSD FRML MDRD: ABNORMAL ML/MIN/{1.73_M2}
GLUCOSE BLD-MCNC: 129 MG/DL (ref 70–99)
HBA1C MFR BLD: 7.1 %
HCT VFR BLD CALC: 43.4 % (ref 40.7–50.3)
HDLC SERPL-MCNC: 35 MG/DL
HEMOGLOBIN: 14 G/DL (ref 13–17)
LDL CHOLESTEROL: 39 MG/DL (ref 0–130)
MCH RBC QN AUTO: 27.6 PG (ref 25.2–33.5)
MCHC RBC AUTO-ENTMCNC: 32.3 G/DL (ref 28.4–34.8)
MCV RBC AUTO: 85.4 FL (ref 82.6–102.9)
NRBC AUTOMATED: 0 PER 100 WBC
PDW BLD-RTO: 15.3 % (ref 11.8–14.4)
PLATELET # BLD: 271 K/UL (ref 138–453)
PMV BLD AUTO: 9.4 FL (ref 8.1–13.5)
POTASSIUM SERPL-SCNC: 4.2 MMOL/L (ref 3.7–5.3)
PROSTATE SPECIFIC ANTIGEN: 0.42 NG/ML
RBC # BLD: 5.08 M/UL (ref 4.21–5.77)
SODIUM BLD-SCNC: 141 MMOL/L (ref 135–144)
TOTAL PROTEIN: 7.8 G/DL (ref 6.4–8.3)
TRIGL SERPL-MCNC: 126 MG/DL
WBC # BLD: 7 K/UL (ref 3.5–11.3)

## 2022-08-02 PROCEDURE — G8417 CALC BMI ABV UP PARAM F/U: HCPCS | Performed by: NURSE PRACTITIONER

## 2022-08-02 PROCEDURE — 2022F DILAT RTA XM EVC RTNOPTHY: CPT | Performed by: NURSE PRACTITIONER

## 2022-08-02 PROCEDURE — 99213 OFFICE O/P EST LOW 20 MIN: CPT | Performed by: NURSE PRACTITIONER

## 2022-08-02 PROCEDURE — G8427 DOCREV CUR MEDS BY ELIG CLIN: HCPCS | Performed by: NURSE PRACTITIONER

## 2022-08-02 PROCEDURE — 83036 HEMOGLOBIN GLYCOSYLATED A1C: CPT | Performed by: NURSE PRACTITIONER

## 2022-08-02 PROCEDURE — 1036F TOBACCO NON-USER: CPT | Performed by: NURSE PRACTITIONER

## 2022-08-02 PROCEDURE — 3051F HG A1C>EQUAL 7.0%<8.0%: CPT | Performed by: NURSE PRACTITIONER

## 2022-08-02 RX ORDER — LISINOPRIL AND HYDROCHLOROTHIAZIDE 12.5; 1 MG/1; MG/1
TABLET ORAL
Qty: 90 TABLET | Refills: 3 | Status: SHIPPED | OUTPATIENT
Start: 2022-08-02

## 2022-08-02 ASSESSMENT — ENCOUNTER SYMPTOMS
COUGH: 0
SHORTNESS OF BREATH: 0

## 2022-08-02 NOTE — PROGRESS NOTES
Hans Jaime (:  1975) is a 51 y.o. male,Established patient, here for evaluation of the following chief complaint(s):  Diabetes (3 month follow up- Pt checks his sugars off and on at home.) and Other (Pt would like yearly lab orders)         ASSESSMENT/PLAN:  1. Type 2 diabetes mellitus with diabetic polyneuropathy, without long-term current use of insulin (HCC)  -     POCT glycosylated hemoglobin (Hb A1C)  -     CBC; Future  -     Comprehensive Metabolic Panel; Future  -     Lipid Panel; Future  2. Medication refill  -     lisinopril-hydroCHLOROthiazide (PRINZIDE;ZESTORETIC) 10-12.5 MG per tablet; Take one tablet by mouth once daily, Disp-90 tablet, R-3Normal  3. Primary hypertension  -     lisinopril-hydroCHLOROthiazide (PRINZIDE;ZESTORETIC) 10-12.5 MG per tablet; Take one tablet by mouth once daily, Disp-90 tablet, R-3Normal  4. Screening for malignant neoplasm of prostate  -     PSA Screening; Future      No follow-ups on file. Subjective   SUBJECTIVE/OBJECTIVE:  HPI  Dm a1c is down to 7.1 from 7.8 with diet and exercise. Eye exam recommended. Is going to make an apt with dentist.     Review of Systems   Constitutional:  Negative for chills and fever. Respiratory:  Negative for cough and shortness of breath. Cardiovascular:  Negative for chest pain and leg swelling. Objective   Vitals:    22 1305   BP: 132/82   Pulse: 86   SpO2: 97%     Wt Readings from Last 3 Encounters:   22 243 lb (110.2 kg)   22 253 lb (114.8 kg)   22 253 lb (114.8 kg)       Physical Exam  Constitutional:       General: He is not in acute distress. Appearance: He is well-developed. HENT:      Head: Normocephalic. Right Ear: External ear normal.      Left Ear: External ear normal.   Cardiovascular:      Rate and Rhythm: Normal rate and regular rhythm. Heart sounds: Normal heart sounds.    Pulmonary:      Effort: Pulmonary effort is normal.      Breath sounds: Normal breath sounds. Musculoskeletal:         General: Normal range of motion. Skin:     General: Skin is warm and dry. Neurological:      Mental Status: He is alert and oriented to person, place, and time. An electronic signature was used to authenticate this note.     --TRES Raza - CNP

## 2022-08-04 DIAGNOSIS — I10 PRIMARY HYPERTENSION: ICD-10-CM

## 2022-08-04 DIAGNOSIS — Z76.0 MEDICATION REFILL: ICD-10-CM

## 2022-08-04 RX ORDER — LISINOPRIL AND HYDROCHLOROTHIAZIDE 12.5; 1 MG/1; MG/1
TABLET ORAL
Qty: 90 TABLET | Refills: 3 | OUTPATIENT
Start: 2022-08-04

## 2022-09-21 ENCOUNTER — OFFICE VISIT (OUTPATIENT)
Dept: PODIATRY | Age: 47
End: 2022-09-21
Payer: MEDICAID

## 2022-09-21 VITALS — WEIGHT: 243 LBS | HEIGHT: 67 IN | BODY MASS INDEX: 38.14 KG/M2

## 2022-09-21 DIAGNOSIS — D23.72 BENIGN NEOPLASM OF SKIN OF LEFT FOOT: Primary | ICD-10-CM

## 2022-09-21 DIAGNOSIS — M79.605 PAIN IN BOTH LOWER EXTREMITIES: ICD-10-CM

## 2022-09-21 DIAGNOSIS — L85.3 XEROSIS CUTIS: ICD-10-CM

## 2022-09-21 DIAGNOSIS — B35.1 DERMATOPHYTOSIS OF NAIL: ICD-10-CM

## 2022-09-21 DIAGNOSIS — M79.604 PAIN IN BOTH LOWER EXTREMITIES: ICD-10-CM

## 2022-09-21 DIAGNOSIS — L60.0 INGROWN NAIL: ICD-10-CM

## 2022-09-21 DIAGNOSIS — D23.71 BENIGN NEOPLASM OF SKIN OF RIGHT FOOT: ICD-10-CM

## 2022-09-21 PROCEDURE — 99213 OFFICE O/P EST LOW 20 MIN: CPT | Performed by: PODIATRIST

## 2022-09-21 PROCEDURE — 1036F TOBACCO NON-USER: CPT | Performed by: PODIATRIST

## 2022-09-21 PROCEDURE — 11721 DEBRIDE NAIL 6 OR MORE: CPT | Performed by: PODIATRIST

## 2022-09-21 PROCEDURE — 17110 DESTRUCTION B9 LES UP TO 14: CPT | Performed by: PODIATRIST

## 2022-09-21 PROCEDURE — G8427 DOCREV CUR MEDS BY ELIG CLIN: HCPCS | Performed by: PODIATRIST

## 2022-09-21 PROCEDURE — G8417 CALC BMI ABV UP PARAM F/U: HCPCS | Performed by: PODIATRIST

## 2022-09-21 RX ORDER — AMMONIUM LACTATE 12 G/100G
CREAM TOPICAL
Qty: 1 EACH | Refills: 3 | Status: SHIPPED | OUTPATIENT
Start: 2022-09-21

## 2022-09-21 NOTE — PROGRESS NOTES
[address] [facility] PODIATRY [facility]  [address] [address] Manuel [address] [place] [address]  Dept: [phone]  Dept Fax: [phone]     FOOT PAIN & BENIGN NEOPLASM PROGRESS NOTE  Date of patient's visit: 9/21/2022  Patient's Name:  Naomie Gomez YOB: 1975            Patient Care Team:  TRES Christian CNP as PCP - General (Certified Nurse Practitioner)  TRES Christian CNP as PCP - [facility]  Iwona Ceja MD as Consulting Physician (Ophthalmology)  Best Curry DPM as Physician (Podiatry)          Chief Complaint   Patient presents with    Foot Pain     Bilateral feet    Benign Neoplasm     Bilateral feet       Subjective: This Naomie Gomez comes to clinic for foot and nail care. Pt currently has complaint of thickened, painful, elongated nails that he/she cannot manage by themselves. Pt. Relates pain to nails with shoe gear. Pt's primary care physician is Som Richards seen  08/02/2022. Past Medical History:   Diagnosis Date    Dry skin     GERD (gastroesophageal reflux disease)     Hematuria     HLD (hyperlipidemia) 3/2/2015    Hypertension     AIDEN (obstructive sleep apnea)     Pre-diabetes     Sleep apnea     does not use a machine    Wears dentures     upper     Pt has a new complaint of increased painful skin lesions and dry skin.   Pt has tried changing shoes but it has not helped the pain      No Known Allergies  Current Outpatient Medications on File Prior to Visit   Medication Sig Dispense Refill    lisinopril-hydroCHLOROthiazide (PRINZIDE;ZESTORETIC) 10-12.5 MG per tablet Take one tablet by mouth once daily 90 tablet 3    Urea (CARMOL) 40 % cream Apply to feet bid 1 each 2    atorvastatin (LIPITOR) 20 MG tablet take 1 tablet by mouth once daily 90 tablet 1    Lancets MISC 1 each by Does not apply route daily 300 each 1    blood glucose monitor strips Test 1 time a day & as needed for symptoms of irregular blood glucose. Dispense sufficient amount for indicated testing frequency plus additional to accommodate PRN testing needs. 300 strip 1    nystatin (MYCOSTATIN) 308289 UNIT/GM powder Apply 2 times daily. 1 Bottle 2    Blood Glucose Monitoring Suppl (ACURA BLOOD GLUCOSE METER) w/Device KIT 1 Device by Does not apply route once for 1 dose 1 kit 0     No current facility-administered medications on file prior to visit. Review of Systems    Review of Systems:   History obtained from chart review and the patient  General ROS: negative for - chills, fatigue, fever, night sweats or weight gain  Constitutional: Negative for chills, diaphoresis, fatigue, fever and unexpected weight change. Musculoskeletal: Positive for arthralgias, gait problem and joint swelling. Neurological ROS: negative for - behavioral changes, confusion, headaches or seizures. Negative for weakness and numbness. Dermatological ROS: negative for - mole changes, rash  Cardiovascular: Negative for leg swelling. Gastrointestinal: Negative for constipation, diarrhea, nausea and vomiting. Objective:  Dermatologic Exam:  Soft tissue lesion to the plantar right and left foot with central core and petechiae. Pain on palpation of lesion. Skin No rashes or nodules noted. .   Skin is thin, with flaky sloughing skin as well as decreased hair growth to the lower leg  Small red hemosiderin deposits seen dorsal foot   Musculoskeletal:     1st MPJ ROM decreased, Bilateral.  Muscle strength 5/5, Bilateral.  Pain present upon palpation of toenails 1-5, Bilateral. decreased medial longitudinal arch, Bilateral.  Ankle ROM decreased,Bilateral.    Dorsally contracted digits present digits 2, Bilateral.     Vascular: DP pulses 1/4 bilateral.  PT pulses 0/4 bilateral.   CFT <5 seconds, Bilateral.  Hair growth absent to the level of the digits, Bilateral.  Edema present, Bilateral.  Varicosities absent, Bilateral. Erythema absent, Bilateral    Neurological: Sensation diminshed to light touch to level of digits, Bilateral.  Protective sensation intact 6/10 sites via 5.07/10g Early Branch-Zoe Monofilament, Bilateral.  negative Tinel's, Bilateral.  negative Valleix sign, Bilateral.      Integument: Warm, dry, supple, Bilateral.  Open lesion absent, Bilateral.  Interdigital maceration absent to web spaces 4, Bilateral.  Nails 1-5 left and 1-5 right thickened > 3.0 mm, dystrophic and crumbly, discolored with subungual debris. Fissures absent, Bilateral.   General: AAO x 3 in NAD. Derm  Toenail Description  Sites of Onychomycosis Involvement (Check affected area)  [x] [x] [x] [x] [x] [x] [x] [x] [x] [x]  5 4 3 2 1 1 2 3 4 5                          Right                                        Left    Thickness  [x] [x] [x] [x] [x] [x] [x] [x] [x] [x]  5 4 3 2 1 1 2 3 4 5                         Right                                        Left    Dystrophic Changes   [x] [x] [x] [x] [x] [x] [x] [x] [x] [x]  5 4 3 2 1 1 2 3 4 5                         Right                                        Left    Color  [x] [x] [x] [x] [x] [x] [x] [x] [x] [x]  5 4 3 2 1 1 2 3 4 5                          Right                                        Left    Incurvation/Ingrowin   [] [] [] [] [] [] [] [] [] []  5 4 3 2 1 1 2 3 4 5                         Right                                        Left    Inflammation/Pain   [x] [x] [x] [x] [x] [x] [x] [x] [x] [x]  5 4 3  2 1 1 2 3 4 5                         Right                                        Left       Nails are painful 1-10 with direct palpation. Q7   []Yes  []No                Q8   [x]Yes  []No                     Q9   []Yes    []No    Assessment:  49 y.o. male with:    Diagnosis Orders   1. Benign neoplasm of skin of left foot  78485 - [place] DESTRUCTION BENIGN LESIONS UP TO 14      2.  Benign neoplasm of skin of right foot  84998 - [place] DESTRU[place] BENIGN LESIONS UP TO 14

## 2022-09-25 ENCOUNTER — APPOINTMENT (OUTPATIENT)
Dept: CT IMAGING | Age: 47
End: 2022-09-25
Payer: MEDICAID

## 2022-09-25 ENCOUNTER — HOSPITAL ENCOUNTER (EMERGENCY)
Age: 47
Discharge: HOME | End: 2022-09-25
Attending: EMERGENCY MEDICINE
Payer: MEDICAID

## 2022-09-25 VITALS
TEMPERATURE: 97.4 F | SYSTOLIC BLOOD PRESSURE: 125 MMHG | OXYGEN SATURATION: 97 % | RESPIRATION RATE: 18 BRPM | HEART RATE: 98 BPM | DIASTOLIC BLOOD PRESSURE: 63 MMHG

## 2022-09-25 DIAGNOSIS — S01.81XA FACIAL LACERATION, INITIAL ENCOUNTER: ICD-10-CM
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
## 2022-09-25 DIAGNOSIS — F10.920 ACUTE ALCOHOLIC INTOXICATION WITHOUT COMPLICATION (HCC): Primary | ICD-10-CM

## 2022-09-25 PROCEDURE — 70450 CT HEAD/BRAIN W/O DYE: CPT

## 2022-09-25 PROCEDURE — 12011 RPR F/E/E/N/L/M 2.5 CM/<: CPT

## 2022-09-25 PROCEDURE — 99284 EMERGENCY DEPT VISIT MOD MDM: CPT

## 2022-09-25 PROCEDURE — 72125 CT NECK SPINE W/O DYE: CPT

## 2022-09-25 ASSESSMENT — PAIN DESCRIPTION - PAIN TYPE: TYPE: ACUTE PAIN

## 2022-09-25 ASSESSMENT — PAIN - FUNCTIONAL ASSESSMENT: PAIN_FUNCTIONAL_ASSESSMENT: 0-10

## 2022-09-25 ASSESSMENT — PAIN DESCRIPTION - LOCATION: LOCATION: HEAD

## 2022-09-25 ASSESSMENT — ENCOUNTER SYMPTOMS
NAUSEA: 0
ABDOMINAL DISTENTION: 0
CHEST TIGHTNESS: 0
SHORTNESS OF BREATH: 0
ABDOMINAL PAIN: 0
VOMITING: 0
COLOR CHANGE: 0

## 2022-09-25 ASSESSMENT — PAIN DESCRIPTION - ORIENTATION: ORIENTATION: MID

## 2022-09-25 NOTE — DISCHARGE INSTRUCTIONS
You will be discharged. Please refrain from drinking a lot of alcohol that led to your fall. Due to contusion you may have some mental fogginess the next couple of days. If anytime you have steady gait, you fall, please return to emergency room for evaluation. CT CERVICAL SPINE WO CONTRAST   Final Result   No acute abnormality of the cervical spine. Cervical spine degenerative changes with possibly significant stenosis   involving the C5-6 central canal and left foramen and C4-5 right foramen. CT HEAD WO CONTRAST   Final Result   No acute intracranial abnormality. Findings consistent with old anteroinferior bifrontal contusions. Findings suggestive of acute right frontal sinusitis. Correlate clinically. Bilateral old healed medial orbital wall blowout fractures.

## 2022-09-25 NOTE — ED TRIAGE NOTES
Pt moved to ED 8 via stretcher by EMS. Pt ambulated to the cot with a steady gait. Pt is a/o x4. Pt stated he fell and hit his head on the fridge. Pt denies any LOC. Pt admits to having a few drinks tonight. Laceration noted to middle of forehead above the nose. Bleeding controlled. Vitals assessed and noted. NAD noted. Will continue to monitor.

## 2022-09-25 NOTE — ED PROVIDER NOTES
[facility]  ED  Emergency Department  Emergency Medicine Resident Sign-out     Care of Gigi Quezada was assumed from Dr. Darrell Aviles and is being seen for Head Laceration (Hit head on fridge, denies LOC, bleeding controlled) and Alcohol Intoxication  . The patient's initial evaluation and plan have been discussed with the prior provider who initially evaluated the patient. EMERGENCY DEPARTMENT COURSE / MEDICAL DECISION MAKING:       MEDICATIONS GIVEN:  No orders of the defined types were placed in this encounter. LABS / RADIOLOGY:     Labs Reviewed - No data to display    CT HEAD WO CONTRAST    Result Date: 9/25/2022  EXAMINATION: CT OF THE HEAD WITHOUT CONTRAST  9/25/2022 6:11 am TECHNIQUE: CT of the head was performed without the administration of intravenous contrast. Automated exposure control, iterative reconstruction, and/or weight based adjustment of the mA/kV was utilized to reduce the radiation dose to as low as reasonably achievable. COMPARISON: 12/17/2017 HISTORY: ORDERING SYSTEM PROVIDED HISTORY: fall w/ head trauma TECHNOLOGIST PROVIDED HISTORY: fall w/ head trauma Decision Support Exception - unselect if not a suspected or confirmed emergency medical condition->Emergency Medical Condition (MA) Reason for Exam: fall w/ head trauma FINDINGS: BRAIN/VENTRICLES: There is no acute intracranial hemorrhage, mass effect or midline shift. No abnormal extra-axial fluid collection. The gray-white differentiation is maintained without evidence of an acute infarct. There is no evidence of hydrocephalus. Mild anteroinferior bifrontal encephalomalacia, stable. ORBITS: The visualized portion of the orbits demonstrate no acute abnormality. SINUSES: The visualized paranasal sinuses and mastoids are noted for complete opacification of right frontal sinus and a few underlying right ethmoid air cells. SOFT TISSUES/SKULL:  No acute abnormality of the visualized skull or soft tissues. Bilateral old healed medial orbital wall blowout fractures. No acute intracranial abnormality. Findings consistent with old anteroinferior bifrontal contusions. Findings suggestive of acute right frontal sinusitis. Correlate clinically. Bilateral old healed medial orbital wall blowout fractures. CT CERVICAL SPINE WO CONTRAST    Result Date: 9/25/2022  EXAMINATION: CT OF THE CERVICAL SPINE WITHOUT CONTRAST 9/25/2022 6:19 am TECHNIQUE: CT of the cervical spine was performed without the administration of intravenous contrast. Multiplanar reformatted images are provided for review. Automated exposure control, iterative reconstruction, and/or weight based adjustment of the mA/kV was utilized to reduce the radiation dose to as low as reasonably achievable. COMPARISON: 12/17/2017 HISTORY: ORDERING SYSTEM PROVIDED HISTORY: ro fx TECHNOLOGIST PROVIDED HISTORY: ro fx Decision Support Exception - unselect if not a suspected or confirmed emergency medical condition->Emergency Medical Condition (MA) Reason for Exam: ro fx FINDINGS: BONES/ALIGNMENT: There is mild reversal of the normal cervical lordosis attributed to patient positioning. Vertebral body heights and bone density are normal. DEGENERATIVE CHANGES: Mild diffuse cervical spine degenerative changes with possibly significant foraminal stenosis on the right at C4-5 and on the left at C5-6. There is possibly significant central canal stenosis at C5-6. SOFT TISSUES: There is no prevertebral soft tissue swelling. No acute abnormality of the cervical spine. Cervical spine degenerative changes with possibly significant stenosis involving the C5-6 central canal and left foramen and C4-5 right foramen. RECENT VITALS:     Temp: 97.4 °F (36.3 °C),  Heart Rate: 98, Resp: 18, BP: 125/63, SpO2: 97 %      This patient is a 49 y.o. Male with alcohol intoxication. Patient fell.   His CT head was negative for acute fracture or intracranial bleed however did show contusion over the frontal scalp. ED Course as of 09/25/22 1539   Sun Sep 25, 2022   1507 Patient's CT head and CT C-spine negative. Patient is still intoxicated however. We attempted to have him stand and ambulate, however he is still quite unsteady. The patient will be handed over to the oncoming ED resident. He will be observed for clinical sobriety prior to discharge. [KB]   [id number] Pt reassessed, he is still drowsy. Will reassess him again in an hour [AN]   [phone] Patient reassessed, he is more awake and alert and answering question. Related to the restroom. He reports that his girlfriend will come and pick him up. Patient given a turkey sandwich. [AN]      ED Course User Index  [AN] Megha Fay MD  [KB] Susan Carrlilo MD       OUTSTANDING TASKS / RECOMMENDATIONS:         FINAL IMPRESSION:     1. Acute alcoholic intoxication without complication ([address]    2. Fall from standing, initial encounter    3.  Facial laceration, initial encounter        DISPOSITION:         DISPOSITION:  [x]  Discharge   []  Transfer -    []  Admission -     []  Against Medical Advice   []  Eloped   FOLLOW-UP: Jr Arshad, APRN - CNP  [address] [facility] [place] [address]  [phone]    In 3 days      [facility] ED  [address] [place] [address]  [phone]    If symptoms worsen     DISCHARGE MEDICATIONS: Discharge Medication List as of 9/25/2022 10:55 AM             Mehga Fay MD  Emergency Medicine Resident  [address] [facility]        Megha Fay MD  Resident  09/25/22 8671

## 2022-09-25 NOTE — ED PROVIDER NOTES
Faculty Sign-Out Attestation  Handoff taken on the following patient from prior Attending Physician: Santo Woody    I was available and discussed any additional care issues that arose and coordinated the management plans with the resident(s) caring for the patient during my duty period. Any areas of disagreement with residents documentation of care or procedures are noted on the chart. I was personally present for the key portions of any/all procedures during my duty period. I have documented in the chart those procedures where I was not present during the key portions. CT CERVICAL SPINE WO CONTRAST   Final Result   No acute abnormality of the cervical spine. Cervical spine degenerative changes with possibly significant stenosis   involving the C5-6 central canal and left foramen and C4-5 right foramen. CT HEAD WO CONTRAST   Final Result   No acute intracranial abnormality. Findings consistent with old anteroinferior bifrontal contusions. Findings suggestive of acute right frontal sinusitis. Correlate clinically. Bilateral old healed medial orbital wall blowout fractures.                Jazmine Mayorga MD  Attending Physician       Jazmine Mayorga MD  09/25/22 2867

## 2022-09-25 NOTE — ED PROVIDER NOTES
[facility]     Emergency Department     Faculty Attestation    I performed a history and physical examination of the patient and discussed management with the resident. I reviewed the residents note and agree with the documented findings and plan of care. Any areas of disagreement are noted on the chart. I was personally present for the key portions of any procedures. I have documented in the chart those procedures where I was not present during the key portions. I have reviewed the emergency nurses triage note. I agree with the chief complaint, past medical history, past surgical history, allergies, medications, social and family history as documented unless otherwise noted below. For Physician Assistant/ Nurse Practitioner cases/documentation I have personally evaluated this patient and have completed at least one if not all key elements of the E/M (history, physical exam, and MDM). Additional findings are as noted. I have personally seen and evaluated the patient. I find the patient's history and physical exam are consistent with the NP/PA documentation. I agree with the care provided, treatment rendered, disposition and follow-up plan. 18-year-old male presenting with head laceration after hitting his head on the refrigerator door. Patient has been drinking tonight. He is not sure if he passed out. He denies any falls. Exam:  General: Laying on the bed and in no acute distress  CV: normal rate and regular rhythm  Lungs: Breathing comfortably on room air with no tachypnea, hypoxia, or increased work of breathing  HEENT: Laceration on the center of the forehead, with small teardrop shaped flap    Plan:  CT head and C-spine to rule out intracranial injury  Laceration repair with half buried horizontal mattress stitch. Ling Raza MD   Attending Emergency Physician    (Please note that portions of this note were completed with a voice recognition program. Efforts were made to edit the dictations but occasionally words are mis-transcribed.)            Clearance MD Haroon  09/25/22 0120

## 2022-09-25 NOTE — H&P
[facility]  ED  Emergency Department Encounter  Emergency Medicine Resident     Pt Grisel Goff  MRN: [id number]  [place] 1975  Date of evaluation: 22  PCP:  TRES Quiles - [facility]       Chief Complaint   Patient presents with    Head Laceration     Hit head on fridge, denies LOC, bleeding controlled    Alcohol Intoxication       HISTORY OF PRESENT ILLNESS  (Location/Symptom, Timing/Onset, Context/Setting, Quality, Duration, Modifying Factors, Severity.)      Tala Guzman is a 49 y.o. male who presents with complaint of fall. This patient states that he had been consuming some alcohol this evening, and was attempting to get some food from the refrigerator at home tonight when he lost his balance and fell face first into the refrigerator. He sustained a laceration to his forehead. Denies any loss of consciousness. He denies any numbness weakness, paresthesias. He does not take any blood thinners. PAST MEDICAL / SURGICAL / SOCIAL / FAMILY HISTORY      has a past medical history of Dry skin, GERD (gastroesophageal reflux disease), Hematuria, HLD (hyperlipidemia), Hypertension, AIDEN (obstructive sleep apnea), Pre-diabetes, Sleep apnea, and Wears dentures. has a past surgical history that includes arthroplasty; Colonoscopy (2014); Foot surgery (Left, 2017); Ankle surgery (Left); Cystoscopy (2017); and Cystoscopy (Bilateral, 3/21/2017). Social History     Socioeconomic History    Marital status: Single     Spouse name: Not on file    Number of children: Not on file    Years of education: Not on file    Highest education level: Not on file   Occupational History    Not on file   Tobacco Use    Smoking status: Former     Types: Cigars     Quit date: 2019     Years since quittin.7    Smokeless tobacco: Never    Tobacco comments:     occassionally   Substance and Sexual Activity    Alcohol use:  Yes Alcohol/week: 4.2 standard drinks     Types: 5 Standard drinks or equivalent per week     Comment: weekends/ 6 drinks    Drug use: No    Sexual activity: Not on file   Other Topics Concern    Not on file   Social History Narrative    Not on file     Social Determinants of Health     Financial Resource Strain: Low Risk     Difficulty of Paying Living Expenses: Not very hard   Food Insecurity: No Food Insecurity    Worried About Running Out of Food in the Last Year: Never true    Stacey of Food in the Last Year: Never true   Transportation Needs: Not on file   Physical Activity: Not on file   Stress: Not on file   Social Connections: Not on file   Intimate Partner Violence: Not on file   Housing Stability: Not on file       Family History   Problem Relation Age of Onset    High Blood Pressure Mother     Diabetes Father        Allergies:  Patient has no known allergies. Home Medications:  Prior to Admission medications    Medication Sig Start Date End Date Taking? Authorizing Provider   ammonium lactate (LAC-HYDRIN) 12 % cream Apply topically as needed. 9/21/22   Yesika Sheikh DPM   lisinopril-hydroCHLOROthiazide (PRINZIDE;ZESTORETIC) 10-12.5 MG per tablet Take one tablet by mouth once daily 8/2/22   TRES Meneses CNP   Urea (CARMOL) 40 % cream Apply to feet bid 5/11/22   Yesika Sheikh DPM   atorvastatin (LIPITOR) 20 MG tablet take 1 tablet by mouth once daily 4/25/22   TRES Meneses CNP   Lancets MISC 1 each by Does not apply route daily 4/26/21   TRES Meneses CNP   blood glucose monitor strips Test 1 time a day & as needed for symptoms of irregular blood glucose. Dispense sufficient amount for indicated testing frequency plus additional to accommodate PRN testing needs. 4/26/21   TRES Meneses CNP   nystatin (MYCOSTATIN) 157507 UNIT/GM powder Apply 2 times daily.  12/21/20   Yesika Sheikh DPM   Blood Glucose Monitoring Suppl [facility] BLOOD GLUCOSE METER) w/Device KIT 1 Device by Does not apply route once for 1 dose 7/13/20 8/2/22  Leana Butler, APRN - CNP       REVIEW OF SYSTEMS    (2-9 systems for level 4, 10 or more for level 5)      Review of Systems   Constitutional:  Negative for activity change, appetite change and fatigue. HENT:  Negative for ear pain and tinnitus. Respiratory:  Negative for chest tightness and shortness of breath. Cardiovascular:  Negative for chest pain. Gastrointestinal:  Negative for abdominal distention, abdominal pain, nausea and vomiting. Musculoskeletal:  Negative for arthralgias, neck pain and neck stiffness. Skin:  Negative for color change. Neurological:  Positive for headaches. Negative for dizziness and numbness. PHYSICAL EXAM   (up to 7 for level 4, 8 or more for level 5)      INITIAL VITALS:   /63   Pulse 98   Temp 97.4 °F (36.3 °C) (Oral)   Resp 18   SpO2 97%     Physical Exam  Vitals reviewed. Constitutional:       General: He is not in acute distress. Appearance: Normal appearance. He is not ill-appearing. HENT:      Head: Normocephalic. Comments: There is an approximately half centimeter laceration to the forehead. There is a thin circular laceration extending over the forehead as well. Cardiovascular:      Rate and Rhythm: Normal rate and regular rhythm. Pulses: Normal pulses. Heart sounds: Normal heart sounds. Pulmonary:      Effort: Pulmonary effort is normal.   Abdominal:      General: Abdomen is flat. There is no distension. Palpations: Abdomen is soft. Tenderness: There is no abdominal tenderness. There is no right CVA tenderness or left CVA tenderness. Musculoskeletal:         General: No swelling, tenderness or deformity. Normal range of motion. Comments: There is no tenderness to palpation of the cervical, thoracic, lumbar vertebrae.   Normal range of motion of the C spine with no pain or tenderness to palpation   Skin: General: Skin is warm and dry. Neurological:      General: No focal deficit present. Mental Status: He is alert and oriented to person, place, and time. Sensory: No sensory deficit. Comments: Slight delay in responses due to alcohol intoxication       DIFFERENTIAL  DIAGNOSIS     PLAN (LABS / IMAGING / EKG):  Orders Placed This Encounter   Procedures    CT HEAD WO CONTRAST    CT CERVICAL SPINE WO CONTRAST       MEDICATIONS ORDERED:  No orders of the defined types were placed in this encounter. DDX: Laceration, alcohol intoxication, intracranial hemorrhage, bone fracture    DIAGNOSTIC RESULTS / EMERGENCY DEPARTMENT COURSE / MDM   LAB RESULTS:  No results found for this visit on 09/25/22. IMPRESSION:     RADIOLOGY:  CT CERVICAL SPINE WO CONTRAST   Final Result   No acute abnormality of the cervical spine. Cervical spine degenerative changes with possibly significant stenosis   involving the C5-6 central canal and left foramen and C4-5 right foramen. CT HEAD WO CONTRAST   Final Result   No acute intracranial abnormality. Findings consistent with old anteroinferior bifrontal contusions. Findings suggestive of acute right frontal sinusitis. Correlate clinically. Bilateral old healed medial orbital wall blowout fractures. EKG      All EKG's are interpreted by the Emergency Department Physician who either signs or Co-signs this chart in the absence of a cardiologist.    EMERGENCY DEPARTMENT COURSE:  This patient presents with unremarkable vital signs. His GCS is 14. There is a laceration overlying the forehead that is approximately half a centimeter long with a skin flap present. The patient's wound was cleaned and approximated with 6-0 Prolene. We will obtain CT scans of his head and C-spine    0740  Patient's CT head and CT C-spine negative. Patient is still intoxicated however.   We attempted to have him stand and ambulate, however he is still quite unsteady. The patient will be handed over to the oncoming ED resident. He will be observed for clinical sobriety prior to discharge. PROCEDURES:  Laceration repair  Patient's wound was flushed copiously with sterile saline. Thereafter, using 1% lidocaine without epinephrine a total of 1.5ml was instilled into the wound locally. Thereafter, using 6-0 Prolene, 1 suture was placed and a half buried mattress fashion to achieve good wound approximation. The procedure was tolerated well without any complication. CONSULTS:  None    CRITICAL CARE:  none    FINAL IMPRESSION      1. Acute alcoholic intoxication without complication ([facility] [address]    2. Fall from standing, initial encounter    3.  Facial laceration, initial encounter          DISPOSITION / PLAN     DISPOSITION        PATIENT REFERRED TO:  Devaughn Vazquez, APRN - CNP  [address] [address]  [phone]    In 3 days      DISCHARGE MEDICATIONS:  New Prescriptions    No medications on file       Clive Edward MD  Emergency Medicine Resident    (Please note that portions of thisnote were completed with a voice recognition program.  Efforts were made to edit the dictations but occasionally words are mis-transcribed.)

## 2022-09-27 NOTE — ED PROVIDER NOTES
[facility] ED  Emergency Department Encounter  Emergency Medicine Resident      Pt Aria Haro  MRN: [id number]  [place] 1975  Date of evaluation: 22  PCP:  Terry Marley, APRN - [facility]             Chief Complaint   Patient presents with    Head Laceration       Hit head on fridge, denies LOC, bleeding controlled    Alcohol Intoxication         HISTORY OF PRESENT ILLNESS  (Location/Symptom, Timing/Onset, Context/Setting, Quality, Duration, Modifying Factors, Severity.)       Alma Baker is a 49 y.o. male who presents with complaint of fall. This patient states that he had been consuming some alcohol this evening, and was attempting to get some food from the refrigerator at home tonight when he lost his balance and fell face first into the refrigerator. He sustained a laceration to his forehead. Denies any loss of consciousness. He denies any numbness weakness, paresthesias. He does not take any blood thinners. PAST MEDICAL / SURGICAL / SOCIAL / FAMILY HISTORY       has a past medical history of Dry skin, GERD (gastroesophageal reflux disease), Hematuria, HLD (hyperlipidemia), Hypertension, AIDEN (obstructive sleep apnea), Pre-diabetes, Sleep apnea, and Wears dentures. has a past surgical history that includes arthroplasty; Colonoscopy (2014); Foot surgery (Left, 2017); Ankle surgery (Left); Cystoscopy (2017); and Cystoscopy (Bilateral, 3/21/2017).         Social History               Socioeconomic History    Marital status: Single       Spouse name: Not on file    Number of children: Not on file    Years of education: Not on file    Highest education level: Not on file   Occupational History    Not on file   Tobacco Use    Smoking status: Former       Types: Cigars       Quit date: 2019       Years since quittin.7    Smokeless tobacco: Never    Tobacco comments:       occassionally   Substance and Sexual Activity    Alcohol use: Yes       Alcohol/week: 4.2 standard drinks       Types: 5 Standard drinks or equivalent per week       Comment: weekends/ 6 drinks    Drug use: No    Sexual activity: Not on file   Other Topics Concern    Not on file   Social History Narrative    Not on file      Social Determinants of Health          Financial Resource Strain: Low Risk     Difficulty of Paying Living Expenses: Not very hard   Food Insecurity: No Food Insecurity    Worried About Running Out of Food in the Last Year: Never true    Ran Out of Food in the Last Year: Never true   Transportation Needs: Not on file   Physical Activity: Not on file   Stress: Not on file   Social Connections: Not on file   Intimate Partner Violence: Not on file   Housing Stability: Not on file            Family History         Family History   Problem Relation Age of Onset    High Blood Pressure Mother      Diabetes Father              Allergies:  Patient has no known allergies. Home Medications:  Home Medications           Prior to Admission medications    Medication Sig Start Date End Date Taking? Authorizing Provider   ammonium lactate (LAC-HYDRIN) 12 % cream Apply topically as needed. 9/21/22     Leim Blush, DPM   lisinopril-hydroCHLOROthiazide (PRINZIDE;ZESTORETIC) 10-12.5 MG per tablet Take one tablet by mouth once daily 8/2/22     TRES Tracey CNP   Urea (CARMOL) 40 % cream Apply to feet bid 5/11/22     Leim Blush, DPM   atorvastatin (LIPITOR) 20 MG tablet take 1 tablet by mouth once daily 4/25/22     TRES Tracey CNP   Lancets MISC 1 each by Does not apply route daily 4/26/21     TRES Tracey CNP   blood glucose monitor strips Test 1 time a day & as needed for symptoms of irregular blood glucose. Dispense sufficient amount for indicated testing frequency plus additional to accommodate PRN testing needs.  4/26/21     TRES Tracey CNP   nystatin (MYCOSTATIN) 986110 UNIT/GM powder Apply 2 times daily. 12/21/20     Juris Manual, DPM   Blood Glucose Monitoring Suppl [facility] BLOOD GLUCOSE METER) w/Device KIT 1 Device by Does not apply route once for 1 dose 7/13/20 8/2/22   TRES Murray - CNP            REVIEW OF SYSTEMS    (2-9 systems for level 4, 10 or more for level 5)       Review of Systems   Constitutional:  Negative for activity change, appetite change and fatigue. HENT:  Negative for ear pain and tinnitus. Respiratory:  Negative for chest tightness and shortness of breath. Cardiovascular:  Negative for chest pain. Gastrointestinal:  Negative for abdominal distention, abdominal pain, nausea and vomiting. Musculoskeletal:  Negative for arthralgias, neck pain and neck stiffness. Skin:  Negative for color change. Neurological:  Positive for headaches. Negative for dizziness and numbness. PHYSICAL EXAM   (up to 7 for level 4, 8 or more for level 5)       INITIAL VITALS:   /63   Pulse 98   Temp 97.4 °F (36.3 °C) (Oral)   Resp 18   SpO2 97%      Physical Exam  Vitals reviewed. Constitutional:       General: He is not in acute distress. Appearance: Normal appearance. He is not ill-appearing. HENT:      Head: Normocephalic. Comments: There is an approximately half centimeter laceration to the forehead. There is a thin circular laceration extending over the forehead as well. Cardiovascular:      Rate and Rhythm: Normal rate and regular rhythm. Pulses: Normal pulses. Heart sounds: Normal heart sounds. Pulmonary:      Effort: Pulmonary effort is normal.   Abdominal:      General: Abdomen is flat. There is no distension. Palpations: Abdomen is soft. Tenderness: There is no abdominal tenderness. There is no right CVA tenderness or left CVA tenderness. Musculoskeletal:         General: No swelling, tenderness or deformity. Normal range of motion. Comments:  There is no tenderness to palpation of the cervical, thoracic, lumbar vertebrae. Normal range of motion of the C spine with no pain or tenderness to palpation   Skin:     General: Skin is warm and dry. Neurological:      General: No focal deficit present. Mental Status: He is alert and oriented to person, place, and time. Sensory: No sensory deficit. Comments: Slight delay in responses due to alcohol intoxication         DIFFERENTIAL  DIAGNOSIS      PLAN (LABS / IMAGING / EKG):      Orders Placed This Encounter   Procedures    CT HEAD WO CONTRAST    CT CERVICAL SPINE WO CONTRAST         MEDICATIONS ORDERED:  Encounter Medications    No orders of the defined types were placed in this encounter. DDX: Laceration, alcohol intoxication, intracranial hemorrhage, bone fracture     DIAGNOSTIC RESULTS / EMERGENCY DEPARTMENT COURSE / MDM   LAB RESULTS:  No results found for this visit on 09/25/22. IMPRESSION:      RADIOLOGY:  CT CERVICAL SPINE WO CONTRAST   Final Result   No acute abnormality of the cervical spine. Cervical spine degenerative changes with possibly significant stenosis   involving the C5-6 central canal and left foramen and C4-5 right foramen. CT HEAD WO CONTRAST   Final Result   No acute intracranial abnormality. Findings consistent with old anteroinferior bifrontal contusions. Findings suggestive of acute right frontal sinusitis. Correlate clinically. Bilateral old healed medial orbital wall blowout fractures. EKG        All EKG's are interpreted by the Emergency Department Physician who either signs or Co-signs this chart in the absence of a cardiologist.     EMERGENCY DEPARTMENT COURSE:  This patient presents with unremarkable vital signs. His GCS is 14. There is a laceration overlying the forehead that is approximately half a centimeter long with a skin flap present. The patient's wound was cleaned and approximated with 6-0 Prolene.      We will obtain CT scans of his head and C-spine     0740  Patient's CT head and CT C-spine negative. Patient is still intoxicated however. We attempted to have him stand and ambulate, however he is still quite unsteady. The patient will be handed over to the oncoming ED resident. He will be observed for clinical sobriety prior to discharge. PROCEDURES:  Laceration repair  Patient's wound was flushed copiously with sterile saline. Thereafter, using 1% lidocaine without epinephrine a total of 1.5ml was instilled into the wound locally. Thereafter, using 6-0 Prolene, 1 suture was placed and a half buried mattress fashion to achieve good wound approximation. The procedure was tolerated well without any complication. CONSULTS:  None     CRITICAL CARE:  none     FINAL IMPRESSION       1. Acute alcoholic intoxication without complication ([facility] [address]    2. Fall from standing, initial encounter    3.  Facial laceration, initial encounter           DISPOSITION / PLAN      DISPOSITION          PATIENT REFERRED TO:  TRES Luong - CNP  [address] [address]  [phone]     In 3 days        DISCHARGE MEDICATIONS:      New Prescriptions     No medications on file         Sb Malcolm MD  Emergency Medicine Resident     (Please note that portions of thisnote were completed with a voice recognition program.  Efforts were made to edit the dictations but occasionally words are mis-transcribed.)        Sb Malcolm MD  Resident  09/27/22 [id number]

## 2022-09-30 ENCOUNTER — HOSPITAL ENCOUNTER (EMERGENCY)
Age: 47
Discharge: HOME | End: 2022-09-30
Attending: EMERGENCY MEDICINE
Payer: MEDICAID

## 2022-09-30 VITALS
RESPIRATION RATE: 15 BRPM | DIASTOLIC BLOOD PRESSURE: 89 MMHG | SYSTOLIC BLOOD PRESSURE: 150 MMHG | HEART RATE: 91 BPM | TEMPERATURE: 98.4 F | OXYGEN SATURATION: 91 %

## 2022-09-30 DIAGNOSIS — Z48.02 VISIT FOR SUTURE REMOVAL: Primary | ICD-10-CM

## 2022-09-30 PROCEDURE — 99282 EMERGENCY DEPT VISIT SF MDM: CPT

## 2022-09-30 ASSESSMENT — ENCOUNTER SYMPTOMS
VOMITING: 0
COUGH: 0
FACIAL SWELLING: 1
DIARRHEA: 0
ABDOMINAL PAIN: 0
NAUSEA: 0
BACK PAIN: 0

## 2022-09-30 NOTE — ED PROVIDER NOTES
[facility] ED  Emergency Department Encounter  Emergency Medicine Resident     Pt Mardeen Opitz  MRN: [id number]  [place] 1975  Date of evaluation: 22  PCP:  Shanta Villalba, TRES - [place] 0008       Chief Complaint   Patient presents with    Suture / Staple Removal       HISTORY OF PRESENT ILLNESS  (Location/Symptom, Timing/Onset, Context/Setting, Quality, Duration, Modifying Factors, Severity.)      Tammi Butler is a 49 y.o. male with PMH including HTN, HLD, and AIDEN who presents emergency department for suture removal.  He was seen on 2022 after hitting his head on the fridge he sustained a laceration to his forehead, specifically the glabella region. One 6-0 Prolene suture placed at that time. PAST MEDICAL / SURGICAL / SOCIAL / FAMILY HISTORY      has a past medical history of Dry skin, GERD (gastroesophageal reflux disease), Hematuria, HLD (hyperlipidemia), Hypertension, AIDEN (obstructive sleep apnea), Pre-diabetes, Sleep apnea, and Wears dentures. has a past surgical history that includes arthroplasty; Colonoscopy (2014); Foot surgery (Left, 2017); Ankle surgery (Left); Cystoscopy (2017); and Cystoscopy (Bilateral, 3/21/2017). Social History     Socioeconomic History    Marital status: Single     Spouse name: Not on file    Number of children: Not on file    Years of education: Not on file    Highest education level: Not on file   Occupational History    Not on file   Tobacco Use    Smoking status: Former     Types: Cigars     Quit date: 2019     Years since quittin.7    Smokeless tobacco: Never    Tobacco comments:     occassionally   Substance and Sexual Activity    Alcohol use:  Yes     Alcohol/week: 4.2 standard drinks     Types: 5 Standard drinks or equivalent per week     Comment: weekends/ 6 drinks    Drug use: No    Sexual activity: Not on file   Other Topics Concern    Not on file   Social History Narrative    Not on file     Social Determinants of Health     Financial Resource Strain: Low Risk     Difficulty of Paying Living Expenses: Not very hard   Food Insecurity: No Food Insecurity    Worried About Running Out of Food in the Last Year: Never true    Ran Out of Food in the Last Year: Never true   Transportation Needs: Not on file   Physical Activity: Not on file   Stress: Not on file   Social Connections: Not on file   Intimate Partner Violence: Not on file   Housing Stability: Not on file       Family History   Problem Relation Age of Onset    High Blood Pressure Mother     Diabetes Father        Allergies:  Patient has no known allergies. Home Medications:  Prior to Admission medications    Medication Sig Start Date End Date Taking? Authorizing Provider   ammonium lactate (LAC-HYDRIN) 12 % cream Apply topically as needed. 9/21/22   Ted Vargas DPM   lisinopril-hydroCHLOROthiazide (PRINZIDE;ZESTORETIC) 10-12.5 MG per tablet Take one tablet by mouth once daily 8/2/22   Arabella Majano APRN - CNP   Urea (CARMOL) 40 % cream Apply to feet bid 5/11/22   Ted Vargas DPM   atorvastatin (LIPITOR) 20 MG tablet take 1 tablet by mouth once daily 4/25/22   Arabella Majano APRN - CNP   Lancets MISC 1 each by Does not apply route daily 4/26/21   Arabella Majano APRN - CNP   blood glucose monitor strips Test 1 time a day & as needed for symptoms of irregular blood glucose. Dispense sufficient amount for indicated testing frequency plus additional to accommodate PRN testing needs. 4/26/21   Arabella Majano APRN - CNP   nystatin (MYCOSTATIN) 772403 UNIT/GM powder Apply 2 times daily.  12/21/20   Ted Vargas DPM   Blood Glucose Monitoring Suppl [facility] BLOOD GLUCOSE METER) w/Device KIT 1 Device by Does not apply route once for 1 dose 7/13/20 8/2/22  Arabella Majano APRN - CNP       REVIEW OF SYSTEMS    (2-9 systems for level 4, 10 or more for level 5)      Review of Systems Constitutional:  Negative for activity change, appetite change, chills, fatigue and fever. HENT:  Positive for facial swelling. Negative for congestion. Eyes:  Negative for visual disturbance. Respiratory:  Negative for cough. Cardiovascular:  Negative for chest pain. Gastrointestinal:  Negative for abdominal pain, diarrhea, nausea and vomiting. Musculoskeletal:  Negative for arthralgias, back pain and myalgias. Skin:  Positive for wound. Negative for rash. Neurological:  Negative for seizures, syncope and headaches. Psychiatric/Behavioral:  The patient is not nervous/anxious. PHYSICAL EXAM   (up to 7 for level 4, 8 or more for level 5)      INITIAL VITALS:   BP (!) 150/89   Pulse 91   Temp 98.4 °F (36.9 °C) (Oral)   Resp 15   SpO2 91%     Physical Exam  Constitutional:       General: He is not in acute distress. Appearance: He is obese. He is not ill-appearing, toxic-appearing or diaphoretic. HENT:      Head: Normocephalic. Comments: Mild forehead swelling secondary to injury sustained on 9/25/2022     Right Ear: External ear normal.      Left Ear: External ear normal.      Nose: Nose normal. No congestion. Mouth/Throat:      Mouth: Mucous membranes are moist.      Pharynx: Oropharynx is clear. Eyes:      Extraocular Movements: Extraocular movements intact. Pupils: Pupils are equal, round, and reactive to light. Cardiovascular:      Rate and Rhythm: Normal rate and regular rhythm. Pulses: Normal pulses. Heart sounds: No murmur heard. Pulmonary:      Effort: Pulmonary effort is normal. No respiratory distress. Breath sounds: Normal breath sounds. Abdominal:      General: Bowel sounds are normal.      Palpations: Abdomen is soft. Tenderness: There is no abdominal tenderness. Musculoskeletal:         General: No signs of injury. Cervical back: Neck supple. Skin:     General: Skin is warm and dry. Findings: Lesion present. Comments: Healing glabellar laceration with approximated wound edges with scabbing   Neurological:      Mental Status: He is alert and oriented to person, place, and time. Mental status is at baseline. Motor: No weakness. Psychiatric:         Mood and Affect: Mood normal.         Behavior: Behavior normal.         Thought Content: Thought content normal.         Judgment: Judgment normal.       DIFFERENTIAL  DIAGNOSIS     PLAN (LABS / IMAGING / EKG):  No orders of the defined types were placed in this encounter. MEDICATIONS ORDERED:  No orders of the defined types were placed in this encounter. DDX: Suture removal    DIAGNOSTIC RESULTS / EMERGENCY DEPARTMENT COURSE / MDM   LAB RESULTS:  No results found for this visit on 09/30/22. IMPRESSION: n/a    RADIOLOGY:  No orders to display       EKG  N/a    All EKG's are interpreted by the Emergency Department Physician who either signs or Co-signs this chart in the absence of a cardiologist.    EMERGENCY DEPARTMENT COURSE:    ED Course as of 09/30/22 1211   Fri Sep 30, 2022   1210  PMH including HTN, HLD, and AIDEN who presents emergency department for suture removal.  1 suture placed on 9/25/2022 after the patient hit his head on a refrigerator. Seen today with well approximated margins and scabbing in the glabellar/forehead region. Successful suture removal.  Instructed patient to use ice 3-4 x daily for forehead swelling [GC]      ED Course User Index  [GC] Ninfa Faulkner DO       No notes of EC Admission Criteria type on file. PROCEDURES:  Successful suture removal.  1 Prolene 6-0 moved from glabellar region forehead    CONSULTS:  None    CRITICAL CARE:  N/a    FINAL IMPRESSION      1.  Visit for suture removal          DISPOSITION / PLAN     DISPOSITION Decision To Discharge 09/30/2022 12:03:17 PM      PATIENT REFERRED TO:  TRES Panda CNP  [address] [facility] [phone]    Call in 1 day  for ED follow up      DISCHARGE MEDICATIONS:  New Prescriptions    No medications on file       Doroteo Cedeño DO  Emergency Medicine Resident    (Please note that portions of thisnote were completed with a voice recognition program.  Efforts were made to edit the dictations but occasionally words are mis-transcribed.)       Doroteo Cedeño DO  Resident  09/30/22 [facility],   Resident  09/30/22 1212

## 2022-09-30 NOTE — ED NOTES
This patient was assessed by the doctor only. Nurse processed and completed the orders from this doctor ie labs, meds, and/or EKG.         Balaji Og, SINGHN  37/55/78 [id number]

## 2022-09-30 NOTE — DISCHARGE INSTRUCTIONS
You were seen in the emergency department for suture removal.  1 suture successfully removed. Please follow-up with your primary care as needed.   If you begin to develop any signs of infection including redness, pain, irritation, drainage from that site please return to the emergency room for reevaluation

## 2022-09-30 NOTE — ED PROVIDER NOTES
[facility] [facility]     Emergency Department     Faculty Attestation        I performed a history and physical examination of the patient and discussed management with the resident. I reviewed the residents note and agree with the documented findings and plan of care. Any areas of disagreement are noted on the chart. I was personally present for the key portions of any procedures. I have documented in the chart those procedures where I was not present during the key portions. I have reviewed the emergency nurses triage note. I agree with the chief complaint, past medical history, past surgical history, allergies, medications, social and family history as documented unless otherwise noted below. For mid-level providers such as nurse practitioners as well as physicians assistants:    I have personally seen and evaluated the patient. I find the patient's history and physical exam are consistent with NP/PA documentation. I agree with the care provided, treatment rendered, disposition, & follow-up plan. Additional findings are as noted. Vital Signs: BP (!) 150/89   Pulse 91   Temp 98.4 °F (36.9 °C) (Oral)   Resp 15   SpO2 91%   PCP:  TRES Goldberg - CNP    Pertinent Comments:     Suture placed on Saturday.   It was removed here with no complications and patient tolerated procedure well      Critical Care  None          Timothy Stevens MD    Attending Emergency Medicine Physician            Renea Christian MD  09/30/22 9619

## 2022-10-04 ENCOUNTER — HOSPITAL ENCOUNTER (EMERGENCY)
Age: 47
Discharge: HOME | End: 2022-10-04
Attending: EMERGENCY MEDICINE
Payer: MEDICAID

## 2022-10-04 VITALS
TEMPERATURE: 97.9 F | RESPIRATION RATE: 16 BRPM | SYSTOLIC BLOOD PRESSURE: 130 MMHG | HEART RATE: 80 BPM | OXYGEN SATURATION: 99 % | DIASTOLIC BLOOD PRESSURE: 85 MMHG

## 2022-10-04 DIAGNOSIS — S09.90XD INJURY OF HEAD, SUBSEQUENT ENCOUNTER: Primary | ICD-10-CM

## 2022-10-04 PROCEDURE — 99283 EMERGENCY DEPT VISIT LOW MDM: CPT

## 2022-10-04 RX ORDER — IBUPROFEN 400 MG/1
400 TABLET ORAL EVERY 6 HOURS PRN
Qty: 120 TABLET | Refills: 0 | Status: SHIPPED | OUTPATIENT
Start: 2022-10-04 | End: 2022-10-04 | Stop reason: SDUPTHER

## 2022-10-04 RX ORDER — IBUPROFEN 400 MG/1
400 TABLET ORAL EVERY 6 HOURS PRN
Qty: 120 TABLET | Refills: 0 | Status: SHIPPED | OUTPATIENT
Start: 2022-10-04

## 2022-10-04 RX ORDER — ACETAMINOPHEN 500 MG
500 TABLET ORAL 4 TIMES DAILY PRN
Qty: 360 TABLET | Refills: 1 | Status: SHIPPED | OUTPATIENT
Start: 2022-10-04

## 2022-10-04 RX ORDER — ACETAMINOPHEN 500 MG
500 TABLET ORAL 4 TIMES DAILY PRN
Qty: 360 TABLET | Refills: 1 | Status: SHIPPED | OUTPATIENT
Start: 2022-10-04 | End: 2022-10-04 | Stop reason: SDUPTHER

## 2022-10-04 ASSESSMENT — ENCOUNTER SYMPTOMS
VOMITING: 0
TROUBLE SWALLOWING: 0
EYES NEGATIVE: 1
NAUSEA: 0
APNEA: 0
CONSTIPATION: 0
COLOR CHANGE: 0
ABDOMINAL DISTENTION: 0
VOICE CHANGE: 0
SHORTNESS OF BREATH: 0

## 2022-10-04 ASSESSMENT — PAIN SCALES - GENERAL: PAINLEVEL_OUTOF10: 7

## 2022-10-04 ASSESSMENT — PAIN - FUNCTIONAL ASSESSMENT: PAIN_FUNCTIONAL_ASSESSMENT: 0-10

## 2022-10-04 ASSESSMENT — PAIN DESCRIPTION - LOCATION: LOCATION: HEAD

## 2022-10-04 NOTE — ED PROVIDER NOTES
[address] [facility]     Emergency Department     Faculty Attestation    I performed a history and physical examination of the patient and discussed management with the resident. I reviewed the resident´s note and agree with the documented findings and plan of care. Any areas of disagreement are noted on the chart. I was personally present for the key portions of any procedures. I have documented in the chart those procedures where I was not present during the key portions. I have reviewed the emergency nurses triage note. I agree with the chief complaint, past medical history, past surgical history, allergies, medications, social and family history as documented unless otherwise noted below. For Physician Assistant/ Nurse Practitioner cases/documentation I have personally evaluated this patient and have completed at least one if not all key elements of the E/M (history, physical exam, and MDM). Additional findings are as noted. Mild pain over the glabella at the site of an old scar. I reviewed the patient's recent CAT scan related to this event and there is no bony involvement.      Mojgan Hung MD  10/04/22 7271

## 2022-10-04 NOTE — ED NOTES
This patient was assessed by the doctor only. Nurse processed and completed the orders from this doctor ie labs, meds, and/or EKG.         Sona Menezes RN  10/04/22 8490

## 2022-10-04 NOTE — DISCHARGE INSTRUCTIONS
Thank you for visiting [facility] Emergency Department. You need to call TRES Goldberg CNP to make an appointment as directed for follow up. Should you have any questions regarding your care or further treatment, please call [facility] at [phone].

## 2022-10-04 NOTE — ED PROVIDER NOTES
[facility] ED  Emergency Department Encounter  Emergency Medicine Resident     Pt Name: Neeraj Stephens  YKJ:[id number]  Birthdate 1975  Date of evaluation: 10/4/22  PCP:  TRES [place] 5209       Chief Complaint   Patient presents with    Head Injury     Patient hit his head on the refrigerator last week and was seen for it, but the it is still causing a severe headache. Patient states he hasnt taken any medication to relieve the pain    Headache       HISTORY OF PRESENT ILLNESS  (Location/Symptom, Timing/Onset, Context/Setting, Quality, Duration, ModifyingFactors, Severity.)      Neeraj Stephens is a 49 y.o. male with PMH of head injury approximately 1 week ago presents for reevaluation. Patient states that he has a small knot on the middle of his forehead that feels fluctuant and does not know why its not going away. Patient endorses the pain is a 7 out of 10 over that exact area and has no other complaints. PAST MEDICAL / SURGICAL / SOCIAL /FAMILY HISTORY      has a past medical history of Dry skin, GERD (gastroesophageal reflux disease), Hematuria, HLD (hyperlipidemia), Hypertension, AIDEN (obstructive sleep apnea), Pre-diabetes, Sleep apnea, and Wears dentures. No other pertinent PMH on review with patient/guardian. has a past surgical history that includes arthroplasty; Colonoscopy (2014); Foot surgery (Left, 2017); Ankle surgery (Left); Cystoscopy (2017); and Cystoscopy (Bilateral, 3/21/2017). No other pertinent PSH on review with patient/guardian.   Social History     Socioeconomic History    Marital status: Single     Spouse name: Not on file    Number of children: Not on file    Years of education: Not on file    Highest education level: Not on file   Occupational History    Not on file   Tobacco Use    Smoking status: Former     Types: Cigars     Quit date: 2019     Years since quittin.7    Smokeless tobacco: Never    Tobacco comments:     occassionally   Substance and Sexual Activity    Alcohol use: Yes     Alcohol/week: 4.2 standard drinks     Types: 5 Standard drinks or equivalent per week     Comment: weekends/ 6 drinks    Drug use: No    Sexual activity: Not on file   Other Topics Concern    Not on file   Social History Narrative    Not on file     Social Determinants of Health     Financial Resource Strain: Low Risk     Difficulty of Paying Living Expenses: Not very hard   Food Insecurity: No Food Insecurity    Worried About Running Out of Food in the Last Year: Never true    Ran Out of Food in the Last Year: Never true   Transportation Needs: Not on file   Physical Activity: Not on file   Stress: Not on file   Social Connections: Not on file   Intimate Partner Violence: Not on file   Housing Stability: Not on file       I counseled the patient against using tobacco products. Family History   Problem Relation Age of Onset    High Blood Pressure Mother     Diabetes Father      No other pertinent FamHx on review with patient/guardian. Allergies:  Patient has no known allergies. Home Medications:  Prior to Admission medications    Medication Sig Start Date End Date Taking? Authorizing Provider   ammonium lactate (LAC-HYDRIN) 12 % cream Apply topically as needed. 9/21/22   Mariposa Market, DPM   lisinopril-hydroCHLOROthiazide (PRINZIDE;ZESTORETIC) 10-12.5 MG per tablet Take one tablet by mouth once daily 8/2/22   TRES Maurice CNP   Urea (CARMOL) 40 % cream Apply to feet bid 5/11/22   Mariposa Market, DPM   atorvastatin (LIPITOR) 20 MG tablet take 1 tablet by mouth once daily 4/25/22   TRES Maurice CNP   Lancets MISC 1 each by Does not apply route daily 4/26/21   TRES Maurice CNP   blood glucose monitor strips Test 1 time a day & as needed for symptoms of irregular blood glucose.  Dispense sufficient amount for indicated testing frequency plus additional to accommodate PRN testing needs. 4/26/21   TRES Steward - CNP   nystatin (MYCOSTATIN) 333000 UNIT/GM powder Apply 2 times daily. 12/21/20   Sommer Ferrera DPM   Blood Glucose Monitoring Suppl [facility] BLOOD GLUCOSE METER) w/Device KIT 1 Device by Does not apply route once for 1 dose 7/13/20 8/2/22  TRES Steward - CNP       REVIEW OF SYSTEMS    (2-9 systems for level 4, 10 ormore for level 5)      Review of Systems   Constitutional:  Negative for activity change, appetite change, fever and unexpected weight change. HENT:  Negative for congestion, trouble swallowing and voice change. Eyes: Negative. Respiratory:  Negative for apnea and shortness of breath. Cardiovascular:  Negative for chest pain. Gastrointestinal:  Negative for abdominal distention, constipation, nausea and vomiting. Genitourinary:  Negative for difficulty urinating and urgency. Musculoskeletal: Negative. Skin:  Negative for color change, pallor, rash and wound. Neurological: Negative. Psychiatric/Behavioral: Negative. PHYSICAL EXAM   (up to 7 for level 4, 8 or more for level 5)      INITIAL VITALS:   /85   Pulse 80   Temp 97.9 °F (36.6 °C) (Oral)   Resp 16   SpO2 99%     Physical Exam  Vitals reviewed. Constitutional:       Appearance: Normal appearance. HENT:      Head: Normocephalic and atraumatic. Nose: Nose normal.      Mouth/Throat:      Mouth: Mucous membranes are moist.      Pharynx: Oropharynx is clear. Eyes:      Extraocular Movements: Extraocular movements intact. Conjunctiva/sclera: Conjunctivae normal.      Pupils: Pupils are equal, round, and reactive to light. Cardiovascular:      Rate and Rhythm: Normal rate and regular rhythm. Pulses: Normal pulses. Heart sounds: Normal heart sounds. Pulmonary:      Effort: Pulmonary effort is normal.      Breath sounds: Normal breath sounds. Abdominal:      General: Abdomen is flat. Palpations: Abdomen is soft. Musculoskeletal:         General: Normal range of motion. Cervical back: Normal range of motion and neck supple. Skin:     General: Skin is warm and dry. Capillary Refill: Capillary refill takes less than 2 seconds. Neurological:      General: No focal deficit present. Mental Status: He is alert. Mental status is at baseline. Psychiatric:         Mood and Affect: Mood normal.       DIFFERENTIAL  DIAGNOSIS     DDX: Head injury subsequent encounter    PLAN (LABS / IMAGING / EKG):  No orders of the defined types were placed in this encounter. MEDICATIONS ORDERED:  No orders of the defined types were placed in this encounter. DIAGNOSTIC RESULTS / EMERGENCY DEPARTMENT COURSE / MDM     LABS:  No results found for this visit on 10/04/22. IMPRESSION/MDM/ED COURSE:  49 y.o. male presented with a small head injury subsequently to previous trauma proxy 1 week ago. Bedside ultrasound revealed a small fluid pocket that is not worth excising or draining. We will give the patient pain control and outpatient follow-up as needed. Patient/Guardian requesting discharge. Patient/Guardian was given written and verbal instructions prior to discharge. Patient/Guardian understood and agreed. Patient/Guardian had no further questions. RADIOLOGY:  No orders to display         EKG  None    All EKG's are interpreted by the Emergency Department Physician who either signs or Co-signs this chart in the absence of a cardiologist.      PROCEDURES:  None    CONSULTS:  None        FINAL IMPRESSION      1.  Injury of head, subsequent encounter          DISPOSITION / PLAN       DISPOSITION Decision To Discharge 10/04/2022 12:10:32 PM        PATIENT REFERREDTO:  TRES Purcell CNP  [address]  [phone]    Schedule an appointment as soon as possible for a visit   As needed    [facility] ED  [address] [facility] [address]  [phone]  Go to   As needed      DISCHARGE MEDICATIONS:  New Prescriptions    No medications on file       Caterina Helm MD  PGY 3  Resident Physician Emergency Medicine  10/04/22 12:11 PM        (Please note that portions of this note were completed with a voice recognition program.Efforts were made to edit the dictations but occasionally words are mis-transcribed.)        Caterina Helm MD  Resident  10/04/22 2714

## 2022-10-10 ENCOUNTER — OFFICE VISIT (OUTPATIENT)
Dept: FAMILY MEDICINE CLINIC | Age: 47
End: 2022-10-10
Payer: MEDICAID

## 2022-10-10 VITALS
BODY MASS INDEX: 38.22 KG/M2 | DIASTOLIC BLOOD PRESSURE: 72 MMHG | HEART RATE: 95 BPM | WEIGHT: 244 LBS | SYSTOLIC BLOOD PRESSURE: 122 MMHG | OXYGEN SATURATION: 95 %

## 2022-10-10 DIAGNOSIS — S09.90XD INJURY OF HEAD, SUBSEQUENT ENCOUNTER: Primary | ICD-10-CM

## 2022-10-10 PROCEDURE — G8427 DOCREV CUR MEDS BY ELIG CLIN: HCPCS | Performed by: NURSE PRACTITIONER

## 2022-10-10 PROCEDURE — 99213 OFFICE O/P EST LOW 20 MIN: CPT | Performed by: NURSE PRACTITIONER

## 2022-10-10 PROCEDURE — G8484 FLU IMMUNIZE NO ADMIN: HCPCS | Performed by: NURSE PRACTITIONER

## 2022-10-10 PROCEDURE — G8417 CALC BMI ABV UP PARAM F/U: HCPCS | Performed by: NURSE PRACTITIONER

## 2022-10-10 PROCEDURE — 1036F TOBACCO NON-USER: CPT | Performed by: NURSE PRACTITIONER

## 2022-10-10 ASSESSMENT — ENCOUNTER SYMPTOMS
SHORTNESS OF BREATH: 0
COUGH: 0

## 2022-10-10 NOTE — PROGRESS NOTES
Kendall Lamb (:  1975) is a 49 y.o. male,Established patient, here for evaluation of the following chief complaint(s):  Headache         ASSESSMENT/PLAN:  1. Injury of head, subsequent encounter  No intervention needed at this time  Monse Settler will notify me if lump is getting larger or sxs change. Pt agreeable with plan. No follow-ups on file. Subjective   SUBJECTIVE/OBJECTIVE:  Headache  Headaches are helped by tyelnol ibuprofen  Hit head on refrigerator - sutures removed - returned to er on 10/04 because he was still having headaches. Bedside ultrasound showed small pocket of fluid at site. Pt does not feel it has gotten smaller, has not gotten bigger. Review of Systems   Constitutional:  Negative for chills and fever. Respiratory:  Negative for cough and shortness of breath. Cardiovascular:  Negative for chest pain and leg swelling. Neurological:  Positive for headaches. Objective   Vitals:    10/10/22 1143   BP: 122/72   Pulse: 95   SpO2: 95%     Wt Readings from Last 3 Encounters:   10/10/22 244 lb (110.7 kg)   22 243 lb (110.2 kg)   22 243 lb (110.2 kg)       Physical Exam  Constitutional:       General: He is not in acute distress. Appearance: He is well-developed. HENT:      Head: Normocephalic. Right Ear: External ear normal.      Left Ear: External ear normal.   Cardiovascular:      Rate and Rhythm: Normal rate and regular rhythm. Heart sounds: Normal heart sounds. Pulmonary:      Effort: Pulmonary effort is normal.      Breath sounds: Normal breath sounds. Musculoskeletal:         General: Normal range of motion. Skin:     General: Skin is warm and dry. Neurological:      Mental Status: He is alert and oriented to person, place, and time. An electronic signature was used to authenticate this note.     --Misha Mendoza, TRES - CNP

## 2022-10-10 NOTE — PROGRESS NOTES
Patient is present complaining of headaches  Patient states he has been getting headaches every other day  States they come and go  States he has been taking tylenol and ibuprofen with relief    Patient states he fell 2 weeks ago   Patient has been to the ER 3 times for this

## 2022-10-24 RX ORDER — ATORVASTATIN CALCIUM 20 MG/1
TABLET, FILM COATED ORAL
Qty: 90 TABLET | Refills: 1 | Status: SHIPPED | OUTPATIENT
Start: 2022-10-24

## 2022-10-24 NOTE — TELEPHONE ENCOUNTER
Last visit: 10/10/22  Last Med refill: 4/25/22  Does patient have enough medication for 72 hours: No:     Next Visit Date:  Future Appointments   Date Time Provider Carrillo Still   10/28/2022 10:30 AM [facility] EMG  [facility] EMG [facility]   10/28/2022 10:30 AM Miya Martinez MD [facility] med/reha [facility]   11/30/2022  1:45 PM Lovely Wells DPM [facility]   2/6/2023  1:00 PM TRES Aguilera - CNP [facility] FP [address] Maintenance   Topic Date Due    Pneumococcal 0-64 years Vaccine (1 - PCV) Never done    Diabetic retinal exam  Never done    Hepatitis C screen  Never done    Hepatitis B vaccine (1 of 3 - Risk 3-dose series) Never done    COVID-19 Vaccine (4 - Booster for Pfizer series) 03/03/2022    Flu vaccine (1) 10/10/2023 (Originally 8/1/2022)    Diabetic microalbuminuria test  05/03/2023    Depression Screen  05/03/2023    Diabetic foot exam  05/11/2023    A1C test (Diabetic or Prediabetic)  08/02/2023    Lipids  08/02/2023    Colorectal Cancer Screen  12/22/2024    DTaP/Tdap/Td vaccine (2 - Td or Tdap) 12/16/2027    HIV screen  Completed    Hepatitis A vaccine  Aged Out    Hib vaccine  Aged Out    Meningococcal (ACWY) vaccine  Aged Out       Hemoglobin A1C (%)   Date Value   08/02/2022 7.1   05/03/2022 7.9   04/26/2021 6.6             ( goal A1C is < 7)   No results found for: LABMICR  LDL Cholesterol (mg/dL)   Date Value   08/02/2022 39   08/16/2021 51       (goal LDL is <100)   AST (U/L)   Date Value   08/02/2022 28     ALT (U/L)   Date Value   08/02/2022 37     BUN (mg/dL)   Date Value   08/02/2022 16     BP Readings from Last 3 Encounters:   10/10/22 122/72   10/04/22 130/85   09/30/22 (!) 150/89          (goal 120/80)    All Future Testing planned in [facility]  Lab Frequency Next Occurrence   EMG Once 07/16/2022               Patient Active Problem List:     Hypertension     Rectal bleeding     GERD (gastroesophageal reflux disease)     Dyslipidemia     Lumbar strain Hematuria     Fracture of right orbital floor (HCC)     Right medial orbital wall fracture (HCC)     Exophthalmos of right eye     Morbidly obese (HCC)     Lumbar radiculopathy     Polyneuropathy associated with underlying disease ([facility] [address]

## 2022-10-28 ENCOUNTER — HOSPITAL ENCOUNTER (OUTPATIENT)
Dept: NEUROLOGY | Age: 47
Discharge: HOME | End: 2022-10-28
Payer: MEDICAID

## 2022-10-28 DIAGNOSIS — R20.2 TINGLING OF RIGHT UPPER EXTREMITY: ICD-10-CM

## 2022-10-28 PROCEDURE — 95909 NRV CNDJ TST 5-6 STUDIES: CPT | Performed by: PHYSICAL MEDICINE & REHABILITATION

## 2022-10-28 PROCEDURE — 95886 MUSC TEST DONE W/N TEST COMP: CPT | Performed by: PHYSICAL MEDICINE & REHABILITATION

## 2022-11-03 DIAGNOSIS — M54.12 CERVICAL RADICULOPATHY AT C5: Primary | ICD-10-CM

## 2022-11-03 NOTE — PROGRESS NOTES
A referral was placed to SUSANNAH Guillen due to the patients Emg results revealing a chronic cervical radiculopathy. Please call and make the patient aware that we would like him to see Her prior to us discussing nexts steps in his numbness and tingling in the right upper extremity.

## 2022-11-30 ENCOUNTER — OFFICE VISIT (OUTPATIENT)
Dept: PODIATRY | Age: 47
End: 2022-11-30

## 2022-11-30 VITALS — WEIGHT: 244 LBS | BODY MASS INDEX: 38.3 KG/M2 | HEIGHT: 67 IN

## 2022-11-30 DIAGNOSIS — B35.3 TINEA PEDIS OF BOTH FEET: ICD-10-CM

## 2022-11-30 DIAGNOSIS — L60.0 INGROWN NAIL: ICD-10-CM

## 2022-11-30 DIAGNOSIS — L85.3 XEROSIS CUTIS: ICD-10-CM

## 2022-11-30 DIAGNOSIS — M79.605 PAIN IN BOTH LOWER EXTREMITIES: ICD-10-CM

## 2022-11-30 DIAGNOSIS — D23.71 BENIGN NEOPLASM OF SKIN OF RIGHT FOOT: ICD-10-CM

## 2022-11-30 DIAGNOSIS — M79.604 PAIN IN BOTH LOWER EXTREMITIES: ICD-10-CM

## 2022-11-30 DIAGNOSIS — B35.1 DERMATOPHYTOSIS OF NAIL: ICD-10-CM

## 2022-11-30 DIAGNOSIS — D23.72 BENIGN NEOPLASM OF SKIN OF LEFT FOOT: Primary | ICD-10-CM

## 2022-11-30 NOTE — PROGRESS NOTES
cream Apply topically as needed. 1 each 3    lisinopril-hydroCHLOROthiazide (PRINZIDE;ZESTORETIC) 10-12.5 MG per tablet Take one tablet by mouth once daily 90 tablet 3    Urea (CARMOL) 40 % cream Apply to feet bid 1 each 2    Lancets MISC 1 each by Does not apply route daily 300 each 1    blood glucose monitor strips Test 1 time a day & as needed for symptoms of irregular blood glucose. Dispense sufficient amount for indicated testing frequency plus additional to accommodate PRN testing needs. 300 strip 1    nystatin (MYCOSTATIN) 926971 UNIT/GM powder Apply 2 times daily. 1 Bottle 2    Blood Glucose Monitoring Suppl (ACURA BLOOD GLUCOSE METER) w/Device KIT 1 Device by Does not apply route once for 1 dose 1 kit 0     No current facility-administered medications on file prior to visit. Review of Systems    Review of Systems:   History obtained from chart review and the patient  General ROS: negative for - chills, fatigue, fever, night sweats or weight gain  Constitutional: Negative for chills, diaphoresis, fatigue, fever and unexpected weight change. Musculoskeletal: Positive for arthralgias, gait problem and joint swelling. Neurological ROS: negative for - behavioral changes, confusion, headaches or seizures. Negative for weakness and numbness. Dermatological ROS: negative for - mole changes, rash  Cardiovascular: Negative for leg swelling. Gastrointestinal: Negative for constipation, diarrhea, nausea and vomiting. Objective:  Dermatologic Exam:  Soft tissue lesion to the plantar right and left foot with central core and petechiae. Pain on palpation of lesion. Skin No rashes or nodules noted. .   Skin is thin, with flaky sloughing skin as well as decreased hair growth to the lower leg  Small red hemosiderin deposits seen dorsal foot   Musculoskeletal:     1st MPJ ROM decreased, Bilateral.  Muscle strength 5/5, Bilateral.  Pain present upon palpation of toenails 1-5, Bilateral. decreased medial longitudinal arch, Bilateral.  Ankle ROM decreased,Bilateral.    Dorsally contracted digits present digits 2, Bilateral.     Vascular: DP pulses 1/4 bilateral.  PT pulses 0/4 bilateral.   CFT <5 seconds, Bilateral.  Hair growth absent to the level of the digits, Bilateral.  Edema present, Bilateral.  Varicosities absent, Bilateral. Erythema absent, Bilateral    Neurological: Sensation diminshed to light touch to level of digits, Bilateral.  Protective sensation intact 6/10 sites via 5.07/10g Catron-Zoe Monofilament, Bilateral.  negative Tinel's, Bilateral.  negative Valleix sign, Bilateral.      Integument: Warm, dry, supple, Bilateral.  Open lesion absent, Bilateral.  Interdigital maceration absent to web spaces 4, Bilateral.  Nails 1-5 left and 1-5 right thickened > 3.0 mm, dystrophic and crumbly, discolored with subungual debris. Fissures absent, Bilateral.   General: AAO x 3 in NAD. Derm  Toenail Description  Sites of Onychomycosis Involvement (Check affected area)  [x] [x] [x] [x] [x] [x] [x] [x] [x] [x]  5 4 3 2 1 1 2 3 4 5                          Right                                        Left    Thickness  [x] [x] [x] [x] [x] [x] [x] [x] [x] [x]  5 4 3 2 1 1 2 3 4 5                         Right                                        Left    Dystrophic Changes   [x] [x] [x] [x] [x] [x] [x] [x] [x] [x]  5 4 3 2 1 1 2 3 4 5                         Right                                        Left    Color  [x] [x] [x] [x] [x] [x] [x] [x] [x] [x]  5 4 3 2 1 1 2 3 4 5                          Right                                        Left    Incurvation/Ingrowin   [] [] [] [] [] [] [] [] [] []  5 4 3 2 1 1 2 3 4 5                         Right                                        Left    Inflammation/Pain   [x] [x] [x] [x] [x] [x] [x] [x] [x] [x]  5 4 3  2 1 1 2 3 4 5                         Right                                        Left       Nails are painful 1-10 with direct palpation. Q7   []Yes  []No                Q8   [x]Yes  []No                     Q9   []Yes    []No    Assessment:  49 y.o. male with:    Diagnosis Orders   1. Benign neoplasm of skin of left foot  04076 - [place] DESTRUCTION BENIGN LESIONS UP TO 14      2. Benign neoplasm of skin of right foot  71352 - [place] DESTRUCTION BENIGN LESIONS UP TO 14      3. Xerosis cutis  91721 - [place] DEBRIDEMENT OF NAILS, 6 OR MORE      4. Dermatophytosis of nail  67936 - [place] DEBRIDEMENT OF NAILS, 6 OR MORE      5. Pain in both lower extremities  73535 - [place] DEBRIDEMENT OF NAILS, 6 OR MORE    42956 - [place] DESTRUCTION BENIGN LESIONS UP TO 14      6. Ingrown nail  02990 - [place] DEBRIDEMENT OF NAILS, 6 OR MORE      7. Tinea pedis of both feet                Plan:   Pt was evaluated and examined. Patient was given personalized discharge instructions. To address xerosis, patient to apply lachydrin cream to feet daily. Pt to monitor for fissures due to dryness. Advised pt to contact office is there are any open lesions. The lesions were partially excised via 15 blade and silver nitrate was applied under occlusion. The patient tolerated the procedure well and without complication. Advised patient to use vasoline to the area after tomorrow to prevent surrounding tissue irritation. Nails 1-10 were debrided in length and thickness sharply with a nail nipper and  without incident. Pt will follow up in 9 weeks or sooner if any problems arise. Diagnosis was discussed with the pt and all of their questions were answered in detail. Proper foot hygiene and care was discussed with the pt. Patient to check feet daily and contact the office with any questions/problems/concerns. Other comorbidity noted and will be managed by PCP. Pain waiver discussed with patient and confirmed.    11/30/2022          Electronically signed by Serenity Monzon DPM on 11/30/2022 at 1:59 PM  11/30/2022

## 2022-11-30 NOTE — PATIENT INSTRUCTIONS
Schedule a Vaccine  When you qualify to receive the vaccine, call the [facility]) COVID-19 Vaccination Hotline to schedule your appointment or to get additional information about the [facility]) locations which are offering the COVID-19 vaccine. To be 94% effective, it's important that you receive two doses of one of the COVID-19 vaccines. -If you are receiving the [place] vaccine, your second shot will be scheduled as close to 21 days after the first shot as possible. -If you are receiving the Moderna vaccine, your second shot will be scheduled as close to 28 days after the first shot as possible. [facility]) COVID-19 Vaccination Hotline: [phone]    Links to [facility]) website and [facility] website:    GregoryDesi [facility]-monitoring-coronavirus-covid-19/covid-19-vaccine/[place]-vaccine    [email or web address]

## 2023-02-20 ENCOUNTER — OFFICE VISIT (OUTPATIENT)
Dept: FAMILY MEDICINE CLINIC | Age: 48
End: 2023-02-20

## 2023-02-20 VITALS
BODY MASS INDEX: 38.37 KG/M2 | DIASTOLIC BLOOD PRESSURE: 60 MMHG | SYSTOLIC BLOOD PRESSURE: 110 MMHG | WEIGHT: 245 LBS | OXYGEN SATURATION: 96 % | HEART RATE: 91 BPM

## 2023-02-20 DIAGNOSIS — E11.42 TYPE 2 DIABETES MELLITUS WITH DIABETIC POLYNEUROPATHY, WITHOUT LONG-TERM CURRENT USE OF INSULIN (HCC): Primary | ICD-10-CM

## 2023-02-20 LAB — HBA1C MFR BLD: 6.8 %

## 2023-02-20 RX ORDER — IBUPROFEN 400 MG/1
400 TABLET ORAL EVERY 6 HOURS PRN
Qty: 120 TABLET | Refills: 0 | Status: SHIPPED | OUTPATIENT
Start: 2023-02-20

## 2023-02-20 RX ORDER — ATORVASTATIN CALCIUM 20 MG/1
TABLET, FILM COATED ORAL
Qty: 90 TABLET | Refills: 1 | Status: SHIPPED | OUTPATIENT
Start: 2023-02-20

## 2023-02-20 RX ORDER — ACETAMINOPHEN 500 MG
500 TABLET ORAL 4 TIMES DAILY PRN
Qty: 360 TABLET | Refills: 1 | Status: SHIPPED | OUTPATIENT
Start: 2023-02-20

## 2023-02-20 SDOH — ECONOMIC STABILITY: FOOD INSECURITY: WITHIN THE PAST 12 MONTHS, THE FOOD YOU BOUGHT JUST DIDN'T LAST AND YOU DIDN'T HAVE MONEY TO GET MORE.: NEVER TRUE

## 2023-02-20 SDOH — ECONOMIC STABILITY: FOOD INSECURITY: WITHIN THE PAST 12 MONTHS, YOU WORRIED THAT YOUR FOOD WOULD RUN OUT BEFORE YOU GOT MONEY TO BUY MORE.: NEVER TRUE

## 2023-02-20 SDOH — ECONOMIC STABILITY: HOUSING INSECURITY
IN THE LAST 12 MONTHS, WAS THERE A TIME WHEN YOU DID NOT HAVE A STEADY PLACE TO SLEEP OR SLEPT IN A SHELTER (INCLUDING NOW)?: NO

## 2023-02-20 SDOH — ECONOMIC STABILITY: INCOME INSECURITY: HOW HARD IS IT FOR YOU TO PAY FOR THE VERY BASICS LIKE FOOD, HOUSING, MEDICAL CARE, AND HEATING?: NOT HARD AT ALL

## 2023-02-20 ASSESSMENT — PATIENT HEALTH QUESTIONNAIRE - PHQ9
SUM OF ALL RESPONSES TO PHQ QUESTIONS 1-9: 0
SUM OF ALL RESPONSES TO PHQ QUESTIONS 1-9: 0
1. LITTLE INTEREST OR PLEASURE IN DOING THINGS: 0
2. FEELING DOWN, DEPRESSED OR HOPELESS: 0
SUM OF ALL RESPONSES TO PHQ9 QUESTIONS 1 & 2: 0
SUM OF ALL RESPONSES TO PHQ QUESTIONS 1-9: 0
SUM OF ALL RESPONSES TO PHQ QUESTIONS 1-9: 0

## 2023-02-20 ASSESSMENT — ENCOUNTER SYMPTOMS
SHORTNESS OF BREATH: 0
COUGH: 0

## 2023-02-20 NOTE — PROGRESS NOTES
Jesika Good (:  1975) is a 49 y.o. male,Established patient, here for evaluation of the following chief complaint(s):  Diabetes         ASSESSMENT/PLAN:  1. Type 2 diabetes mellitus with diabetic polyneuropathy, without long-term current use of insulin ([facility])  -     POCT glycosylated hemoglobin (Hb A1C)      Return in about 6 months (around 2023) for Diabetes. Subjective   SUBJECTIVE/OBJECTIVE:  Diabetes  Pertinent negatives for diabetes include no chest pain. Dm- a1c is down to 6.8 from 7. 1.  he is exercising regularly. Had his eye exam last month. He has no new concerns. Review of Systems   Constitutional:  Negative for chills and fever. Respiratory:  Negative for cough and shortness of breath. Cardiovascular:  Negative for chest pain and leg swelling. Objective   Vitals:    23 1330   BP: 110/60   Pulse: 91   SpO2: 96%     Wt Readings from Last 3 Encounters:   23 245 lb (111.1 kg)   22 244 lb (110.7 kg)   10/10/22 244 lb (110.7 kg)       Physical Exam  Constitutional:       General: He is not in acute distress. Appearance: He is well-developed. HENT:      Head: Normocephalic. Right Ear: External ear normal.      Left Ear: External ear normal.   Cardiovascular:      Rate and Rhythm: Normal rate and regular rhythm. Heart sounds: Normal heart sounds. Pulmonary:      Effort: Pulmonary effort is normal.      Breath sounds: Normal breath sounds. Musculoskeletal:         General: Normal range of motion. Skin:     General: Skin is warm and dry. Neurological:      Mental Status: He is alert and oriented to person, place, and time. An electronic signature was used to authenticate this note.     --Rosalva Altamirano, APRN - CNP

## 2023-03-15 ENCOUNTER — OFFICE VISIT (OUTPATIENT)
Dept: PODIATRY | Age: 48
End: 2023-03-15
Payer: MEDICAID

## 2023-03-15 VITALS — BODY MASS INDEX: 38.45 KG/M2 | HEIGHT: 67 IN | WEIGHT: 245 LBS

## 2023-03-15 DIAGNOSIS — L60.0 INGROWN NAIL: ICD-10-CM

## 2023-03-15 DIAGNOSIS — B35.1 DERMATOPHYTOSIS OF NAIL: ICD-10-CM

## 2023-03-15 DIAGNOSIS — M79.605 PAIN IN BOTH LOWER EXTREMITIES: ICD-10-CM

## 2023-03-15 DIAGNOSIS — D23.71 BENIGN NEOPLASM OF SKIN OF RIGHT FOOT: ICD-10-CM

## 2023-03-15 DIAGNOSIS — D23.72 BENIGN NEOPLASM OF SKIN OF LEFT FOOT: Primary | ICD-10-CM

## 2023-03-15 DIAGNOSIS — M79.604 PAIN IN BOTH LOWER EXTREMITIES: ICD-10-CM

## 2023-03-15 DIAGNOSIS — L85.3 XEROSIS CUTIS: ICD-10-CM

## 2023-03-15 PROCEDURE — G8484 FLU IMMUNIZE NO ADMIN: HCPCS | Performed by: PODIATRIST

## 2023-03-15 PROCEDURE — G8417 CALC BMI ABV UP PARAM F/U: HCPCS | Performed by: PODIATRIST

## 2023-03-15 PROCEDURE — G8427 DOCREV CUR MEDS BY ELIG CLIN: HCPCS | Performed by: PODIATRIST

## 2023-03-15 PROCEDURE — 17110 DESTRUCTION B9 LES UP TO 14: CPT | Performed by: PODIATRIST

## 2023-03-15 PROCEDURE — 1036F TOBACCO NON-USER: CPT | Performed by: PODIATRIST

## 2023-03-15 PROCEDURE — 99213 OFFICE O/P EST LOW 20 MIN: CPT | Performed by: PODIATRIST

## 2023-03-15 PROCEDURE — 11721 DEBRIDE NAIL 6 OR MORE: CPT | Performed by: PODIATRIST

## 2023-03-15 RX ORDER — UREA 40 %
CREAM (GRAM) TOPICAL
Qty: 1 EACH | Refills: 2 | Status: SHIPPED | OUTPATIENT
Start: 2023-03-15

## 2023-03-15 NOTE — PROGRESS NOTES
[x] [x] [x] [x] [x] [x] [x] [x] [x] [x]  5 4 3 2 1 1 2 3 4 5                         Right                                        Left        Visual inspection:  Deformity: hammertoe deformity jhonny feet  amputation: absent  Skin lesions: as above  Edema: right- 2+ pitting edema, left- 2+ pitting edema    Sensory exam:  Monofilament sensation: abnormal - 6/10 via SW 5.07/10g monofilament to the plantar foot bilateral feet    Pulses: abnormal - 1/4 dorsalis pedis pulse and 0/4 Posterior tibial pulse,   Pinprick: Impaired  Proprioception: Impaired  Vibration (128 Hz): Impaired       DM with PVD       [x]Yes    []No      Assessment:  49 y.o. male with:   Diagnosis Orders   1. Benign neoplasm of skin of left foot  40441 - [place] DESTRUCTION BENIGN LESIONS UP TO 14      2. Benign neoplasm of skin of right foot  35607 - [place] DESTRUCTION BENIGN LESIONS UP TO 14      3. Dermatophytosis of nail  [place] DEBRIDEMENT NAIL ANY METHOD 6/>      4. Xerosis cutis  Urea (CARMOL) 40 % cream      5. Pain in both lower extremities  [place] DEBRIDEMENT NAIL ANY METHOD 6/>    23744 - [place] DESTRUCTION BENIGN LESIONS UP TO 14      6. Ingrown nail  [place] DEBRIDEMENT NAIL ANY METHOD 6/>                Q7   []Yes    []No                Q8   [x]Yes    []No                     Q9   []Yes    []No    Plan:   Pt was evaluated and examined. Patient was given personalized discharge instructions. The lesions were partially excised via 15 blade and silver nitrate was applied under occlusion. The patient tolerated the procedure well and without complication. Advised patient to use vasoline to the area after tomorrow to prevent surrounding tissue irritation. To address xerosis, patient to apply urea cream to feet daily. Pt to monitor for fissures due to dryness. Advised pt to contact office is there are any open lesions. Nails 1-10 were debrided sharply in length and thickness with a nipper and , without incident.  Pt will follow up in 9 weeks or sooner if any

## 2023-05-04 ENCOUNTER — OFFICE VISIT (OUTPATIENT)
Dept: NEUROSURGERY | Age: 48
End: 2023-05-04
Payer: MEDICAID

## 2023-05-04 VITALS
WEIGHT: 242 LBS | BODY MASS INDEX: 37.9 KG/M2 | DIASTOLIC BLOOD PRESSURE: 83 MMHG | HEART RATE: 84 BPM | OXYGEN SATURATION: 96 % | TEMPERATURE: 99.1 F | RESPIRATION RATE: 20 BRPM | SYSTOLIC BLOOD PRESSURE: 120 MMHG

## 2023-05-04 DIAGNOSIS — M54.12 CERVICAL RADICULOPATHY: Primary | ICD-10-CM

## 2023-05-04 DIAGNOSIS — M47.816 LUMBAR SPONDYLOSIS: ICD-10-CM

## 2023-05-04 PROCEDURE — G8417 CALC BMI ABV UP PARAM F/U: HCPCS | Performed by: NURSE PRACTITIONER

## 2023-05-04 PROCEDURE — 1036F TOBACCO NON-USER: CPT | Performed by: NURSE PRACTITIONER

## 2023-05-04 PROCEDURE — 3074F SYST BP LT 130 MM HG: CPT | Performed by: NURSE PRACTITIONER

## 2023-05-04 PROCEDURE — 3079F DIAST BP 80-89 MM HG: CPT | Performed by: NURSE PRACTITIONER

## 2023-05-04 PROCEDURE — G8427 DOCREV CUR MEDS BY ELIG CLIN: HCPCS | Performed by: NURSE PRACTITIONER

## 2023-05-04 PROCEDURE — 99204 OFFICE O/P NEW MOD 45 MIN: CPT | Performed by: NURSE PRACTITIONER

## 2023-05-04 NOTE — PROGRESS NOTES
[address]  [facility] [address] [facility] [address]  Dept: [phone]    Patient:  Carmen Quiros  YOB: 1975  Date: 5/4/23    The patient is a 49 y.o. male who presents today for consult of the following problems:     Chief Complaint   Patient presents with    New Patient         HPI:     Carmen Quiros is a 49 y.o. male on whom neurosurgical consultation was requested by Lysle Sandhoff, APRN - CNP for management of right hand numbness/tingling, as well as back pain. Has had issues with numbness and tingling to thumb, has been present for several years. Completed trial of nocturnal bracing several years ago, this did not provide any of his. Reports it is constant, does not ever resolve. Present during the day and at night. Denies any associated weakness. Denies any associated neck pain. Denies any left upper extremity symptoms. Also with constant axial low back pain. Waxes and wanes in intensity. Denies any radiation to lower extremities. No associated numbness or tingling. Denies saddle anesthesia, changes to bowels or bladder. Utilizes ibuprofen, Tylenol to manage symptoms. No recent conservative measures in the way of physical therapy. Recent EMG.     History:     Past Medical History:   Diagnosis Date    Dry skin     GERD (gastroesophageal reflux disease)     Hematuria     HLD (hyperlipidemia) 3/2/2015    Hypertension     AIDEN (obstructive sleep apnea)     Pre-diabetes     Sleep apnea     does not use a machine    Wears dentures     upper     Past Surgical History:   Procedure Laterality Date    ANKLE SURGERY Left     fracture    ARTHROPLASTY      4th and 5th toe on left foot    COLONOSCOPY  12/22/2014    normal    CYSTOSCOPY  03/21/2017    retrograde    CYSTOSCOPY Bilateral 3/21/2017    CYSTOSCOPY, RETROGRADE PYELOGRAM performed by Loida Luciano

## 2023-05-17 ENCOUNTER — HOSPITAL ENCOUNTER (OUTPATIENT)
Dept: PHYSICAL THERAPY | Age: 48
Setting detail: THERAPIES SERIES
Discharge: HOME | End: 2023-05-17
Payer: MEDICAID

## 2023-05-17 PROCEDURE — 97161 PT EVAL LOW COMPLEX 20 MIN: CPT

## 2023-05-17 NOTE — CONSULTS
[] Electrical Stimulation Unattended  [id number]   [x] Manual Therapy    63247 [] Electrical Stimulation Attended  [id number]   [] Iontophoresis: 4 mg/mL Dexamethasone Sodium Phosphate  mAmin  21578 []  Medication allergies reviewed for use of   Dexamethasone Sodium Phosphate 4mg/ml with iontophoresis treatments. Pt is not allergic. Frequency:  2 x/week for 12 visits    Todays Treatment:  Precautions:  Modalities:   Exercises:  Access Code: [email or web address]  URL: [email or web address]/  Date: 05/17/2023  Prepared by: Paramjit Ruiz    Exercises  - Supine Lower Trunk Rotation  - 1 x daily - 7 x weekly - 3 sets - 10 reps  - Supine Bridge  - 1 x daily - 7 x weekly - 3 sets - 10 reps  - Supine March  - 1 x daily - 7 x weekly - 3 sets - 10 reps  - Straight Leg Raise  - 1 x daily - 7 x weekly - 3 sets - 10 reps    Pt. Education:  [x] Plans/Goals, Risks/Benefits discussed  [x] Home exercise program: Handout only  Method of Education: [x] Verbal  [x] Demo  [x] Written  Comprehension of Education:  [x] Verbalizes understanding. [x] Demonstrates understanding. [x] Needs Review. [] Demonstrates/verbalizes understanding of HEP/Ed previously given.       Specific Instructions for next treatment[de-identified] Core strengthening, cervical ROM      Evaluation Complexity:  History (Personal factors, comorbidities) [] 0 [x] 1-2 [] 3+   Exam (limitations, restrictions) [] 1-2 [x] 3 [] 4+   Clinical presentation (progression) [x] Stable [] Evolving  [] Unstable   Decision Making [x] Low [] Moderate [] High    [x] Low Complexity [] Moderate Complexity [] High Complexity       Treatment Charges: Mins Units   [x] Evaluation       [x]  Low       []  Moderate       []  High 30 1   []  Modalities     []  Ther Exercise     []  Manual Therapy     []  Ther Activities     []  Aquatics     []  Vasocompression     []  Other       TOTAL TREATMENT TIME: 30      Time in:1500     Time out:1530    Electronically signed by: Helen Alexandre PT

## 2023-05-24 ENCOUNTER — OFFICE VISIT (OUTPATIENT)
Dept: PODIATRY | Age: 48
End: 2023-05-24
Payer: MEDICAID

## 2023-05-24 VITALS — BODY MASS INDEX: 37.98 KG/M2 | WEIGHT: 242 LBS | HEIGHT: 67 IN

## 2023-05-24 DIAGNOSIS — M79.605 PAIN IN BOTH LOWER EXTREMITIES: ICD-10-CM

## 2023-05-24 DIAGNOSIS — B35.1 DERMATOPHYTOSIS OF NAIL: Primary | ICD-10-CM

## 2023-05-24 DIAGNOSIS — D23.71 BENIGN NEOPLASM OF SKIN OF RIGHT FOOT: ICD-10-CM

## 2023-05-24 DIAGNOSIS — M79.604 PAIN IN BOTH LOWER EXTREMITIES: ICD-10-CM

## 2023-05-24 DIAGNOSIS — L85.3 XEROSIS CUTIS: ICD-10-CM

## 2023-05-24 DIAGNOSIS — D23.72 BENIGN NEOPLASM OF SKIN OF LEFT FOOT: ICD-10-CM

## 2023-05-24 DIAGNOSIS — L60.0 INGROWN NAIL: ICD-10-CM

## 2023-05-24 PROCEDURE — 17110 DESTRUCTION B9 LES UP TO 14: CPT | Performed by: PODIATRIST

## 2023-05-24 PROCEDURE — G8427 DOCREV CUR MEDS BY ELIG CLIN: HCPCS | Performed by: PODIATRIST

## 2023-05-24 PROCEDURE — 99213 OFFICE O/P EST LOW 20 MIN: CPT | Performed by: PODIATRIST

## 2023-05-24 PROCEDURE — G8417 CALC BMI ABV UP PARAM F/U: HCPCS | Performed by: PODIATRIST

## 2023-05-24 PROCEDURE — 1036F TOBACCO NON-USER: CPT | Performed by: PODIATRIST

## 2023-05-24 PROCEDURE — 11721 DEBRIDE NAIL 6 OR MORE: CPT | Performed by: PODIATRIST

## 2023-05-30 NOTE — PROGRESS NOTES
[address] PODIATRY [facility]  [address]  [address]  Dept: [phone]  Dept Fax: [phone]    DIABETIC PROGRESS NOTE  Date of patient's visit: 5/30/2023  Patient's Name:  Jackelyn Gil YOB: 1975            Patient Care Team:  TRES Lopez CNP as PCP - General (Certified Nurse Practitioner)  TRES Lopez CNP as PCP - Empaneled Provider  Mabel Christine MD as Consulting Physician (Ophthalmology)  Alexei Stern DPM as Physician (Podiatry)          Chief Complaint   Patient presents with    Diabetes     Diabetic foot care   Bs and A1c unknown    Peripheral Neuropathy     Bl feet        Subjective:   Jackelyn Gil comes to clinic for Diabetes (Diabetic foot care /Bs and A1c unknown) and Peripheral Neuropathy (Bl feet )    he is a diabetic and states that he is doing well. Pt currently has complaint of thickened, elongated nails that they cannot manage by themselves. Pt's primary care physician is TRES Michele CNP last seen 02/20/2023   Pt's last blood sugar was unknown. Pt has a new complaint of increased painful lesions and dry skin to jhonny feet. Pt has tried changing shoes but it has not helped the pain. There is increased pain to left 5th toe. Lab Results   Component Value Date    LABA1C 6.8 02/20/2023      Complains of numbness in the feet bilat.   Past Medical History:   Diagnosis Date    Dry skin     GERD (gastroesophageal reflux disease)     Hematuria     HLD (hyperlipidemia) 3/2/2015    Hypertension     AIDEN (obstructive sleep apnea)     Pre-diabetes     Sleep apnea     does not use a machine    Wears dentures     upper       No Known Allergies  Current Outpatient Medications on File Prior to Visit   Medication Sig Dispense Refill    Urea (CARMOL) 40 % cream Apply to feet bid 1 each 2    atorvastatin (LIPITOR) 20 MG tablet take 1 tablet by mouth once

## 2023-06-01 ENCOUNTER — HOSPITAL ENCOUNTER (OUTPATIENT)
Dept: PHYSICAL THERAPY | Age: 48
Setting detail: THERAPIES SERIES
Discharge: HOME | End: 2023-06-01

## 2023-06-01 NOTE — FLOWSHEET NOTE
[x] [facility] ([facility]) [facility] &  Therapy  [address]    [phone]  F: [phone]   [] [address]  [facility] [address]   [address]  P: [phone]  F: [phone]  [] [address] &  Therapy  [address]  P: [phone]  F: [phone] [] [address]  P: [phone]  F: [phone]  [] [address] [facility]   [address]   [place]: [phone]  F: [phone]   [] [place] [address] [facility] [address]  [place]: [phone]   F: [phone]     Physical Therapy Cancel/No Show note    Date: 2023  Patient: Kel Dotson  : 1975  MRN: [id number]    Cancels/No Shows to date:     For today's appointment patient:    [x]  Cancelled    [] Rescheduled appointment    [] No-show     Reason given by patient:    []  Patient ill    []  Conflicting appointment    [] No transportation      [] Conflict with work    [x] No reason given    [] Weather related    [] COVID-19    [] Other:      Comments:        [x] Next appointment was confirmed    Electronically signed by: Cody Miller PTA

## 2023-06-06 ENCOUNTER — HOSPITAL ENCOUNTER (OUTPATIENT)
Dept: PHYSICAL THERAPY | Age: 48
Setting detail: THERAPIES SERIES
Discharge: HOME | End: 2023-06-06

## 2023-06-06 NOTE — FLOWSHEET NOTE
[x] [facility] ([facility]) - [facility] &  Therapy  [address]    [phone]  F: [phone]   [] [address]  [facility]   [address]  P: [phone]  F: [phone]  [] [address] &  Therapy  [address]  P: [phone]  F: [phone] [] [address]  P: [phone]  F: [phone]  [] [address]  [address] [facility] [address]   [place]: [phone]  F: [phone]   [] [place] [address] [facility] [address]  [place]: [phone]   F: [phone]     Physical Therapy Cancel/No Show note    Date: 2023  Patient: Josiah Santana  : 1975  MRN: [id number]    Cancels/No Shows to date: 0    For today's appointment patient:    [x]  Cancelled    [] Rescheduled appointment    [] No-show     Reason given by patient:    []  Patient ill    []  Conflicting appointment    [] No transportation      [] Conflict with work    [] No reason given    [] Weather related    [] [id number]    [x] Other:      Comments: Patient called stating family emergency. If no contact or reschedule from patient by 23, will consider discharge due to attendance.        [] Next appointment was confirmed    Electronically signed by: Jade Baxter PTA

## 2023-06-26 ENCOUNTER — APPOINTMENT (OUTPATIENT)
Dept: PHYSICAL THERAPY | Age: 48
End: 2023-06-26
Payer: MEDICAID

## 2023-06-28 ENCOUNTER — APPOINTMENT (OUTPATIENT)
Dept: PHYSICAL THERAPY | Age: 48
End: 2023-06-28
Payer: MEDICAID

## 2023-06-30 DIAGNOSIS — Z76.0 MEDICATION REFILL: ICD-10-CM

## 2023-06-30 DIAGNOSIS — I10 PRIMARY HYPERTENSION: ICD-10-CM

## 2023-07-03 RX ORDER — LISINOPRIL AND HYDROCHLOROTHIAZIDE 12.5; 1 MG/1; MG/1
TABLET ORAL
Qty: 90 TABLET | Refills: 3 | Status: SHIPPED | OUTPATIENT
Start: 2023-07-03

## 2023-07-05 ENCOUNTER — HOSPITAL ENCOUNTER (OUTPATIENT)
Dept: PHYSICAL THERAPY | Age: 48
Setting detail: THERAPIES SERIES
Discharge: HOME | End: 2023-07-05

## 2023-07-10 NOTE — DISCHARGE SUMMARY
[x] [facility] ([facility]) [facility] &  Therapy  [address]  [phone]  F: [phone] [] [address]  [address]   [address]  P: [phone]  F: [phone] [] [address]  [address]  P: [phone]  F: [phone] [] [address]  [address]   [place]: [phone]  F: [phone]         Physical Therapy Discharge Note    Date: 7/10/2023      Patient: Damion Marte  : 1975  MRN: [id number]    Physician: Marifer Melton CNP           Insurance: [facility]   Approval was received from 23 to 23. Authorization number [id number].  45637, 48 units  18458, 48 units  72460, 48 units  61593, 48 units  43407, 12 units     Medical Diagnosis: Cervical radiculopathy M54.12 Lumbar spondylosis M47.816              Rehab Codes: M54.2, M54.5  Onset Date: 23                                       Next 's appt: 23 Neurosurgery     Visit# / total visits: 3                                Cancels/No Shows: 2  Date of initial visit: 23                Date of final visit: 23      Subjective:  Refer to initial evaluation. Objective:  Refer to initial evaluation. Assessment:  Refer to initial evaluation.       Treatment to Date:  [x] Therapeutic Exercise    [] Modalities:  [] Therapeutic Activity    [] Ultrasound  [] Electrical Stimulation  [] Gait Training     [] Massage       [] Lumbar/Cervical Traction  [] Neuromuscular Re-education [] Cold/hotpack [] Iontophoresis: 4 mg/mL  [x] Instruction in Home Exercise Program                     Dexamethasone Sodium  [] Manual Therapy             Phosphate 40-80 mAmin  [] Aquatic Therapy                   [] Vasocompression/    [] Other:             Game Ready    Discharge Status:     [] Pt to continue

## 2023-07-13 ENCOUNTER — HOSPITAL ENCOUNTER (EMERGENCY)
Age: 48
Discharge: HOME | End: 2023-07-13
Attending: EMERGENCY MEDICINE
Payer: MEDICAID

## 2023-07-13 ENCOUNTER — APPOINTMENT (OUTPATIENT)
Dept: GENERAL RADIOLOGY | Age: 48
End: 2023-07-13
Payer: MEDICAID

## 2023-07-13 ENCOUNTER — TELEPHONE (OUTPATIENT)
Dept: NEUROSURGERY | Age: 48
End: 2023-07-13

## 2023-07-13 VITALS
RESPIRATION RATE: 16 BRPM | OXYGEN SATURATION: 98 % | BODY MASS INDEX: 38.15 KG/M2 | DIASTOLIC BLOOD PRESSURE: 104 MMHG | HEART RATE: 78 BPM | SYSTOLIC BLOOD PRESSURE: 161 MMHG | TEMPERATURE: 98.2 F | WEIGHT: 243.61 LBS

## 2023-07-13 DIAGNOSIS — S16.1XXA STRAIN OF NECK MUSCLE, INITIAL ENCOUNTER: Primary | ICD-10-CM

## 2023-07-13 DIAGNOSIS — S46.911A STRAIN OF RIGHT SHOULDER, INITIAL ENCOUNTER: ICD-10-CM

## 2023-07-13 PROCEDURE — 99284 EMERGENCY DEPT VISIT MOD MDM: CPT

## 2023-07-13 PROCEDURE — 6360000002 HC RX W HCPCS: Performed by: NURSE PRACTITIONER

## 2023-07-13 PROCEDURE — 73030 X-RAY EXAM OF SHOULDER: CPT

## 2023-07-13 PROCEDURE — 96372 THER/PROPH/DIAG INJ SC/IM: CPT

## 2023-07-13 RX ORDER — METHOCARBAMOL 750 MG/1
750 TABLET, FILM COATED ORAL 3 TIMES DAILY PRN
Qty: 15 TABLET | Refills: 0 | Status: SHIPPED | OUTPATIENT
Start: 2023-07-13 | End: 2023-07-18

## 2023-07-13 RX ORDER — KETOROLAC TROMETHAMINE 30 MG/ML
30 INJECTION, SOLUTION INTRAMUSCULAR; INTRAVENOUS ONCE
Status: COMPLETED | OUTPATIENT
Start: 2023-07-13 | End: 2023-07-13

## 2023-07-13 RX ORDER — ACETAMINOPHEN AND CODEINE PHOSPHATE 300; 30 MG/1; MG/1
1 TABLET ORAL EVERY 6 HOURS PRN
Qty: 10 TABLET | Refills: 0 | Status: SHIPPED | OUTPATIENT
Start: 2023-07-13 | End: 2023-07-16

## 2023-07-13 RX ORDER — IBUPROFEN 600 MG/1
600 TABLET ORAL EVERY 6 HOURS PRN
Qty: 20 TABLET | Refills: 0 | Status: SHIPPED | OUTPATIENT
Start: 2023-07-13

## 2023-07-13 RX ADMIN — KETOROLAC TROMETHAMINE 30 MG: 30 INJECTION, SOLUTION INTRAMUSCULAR; INTRAVENOUS at 10:32

## 2023-07-13 ASSESSMENT — ENCOUNTER SYMPTOMS
SHORTNESS OF BREATH: 0
COLOR CHANGE: 0

## 2023-07-13 ASSESSMENT — PAIN SCALES - GENERAL
PAINLEVEL_OUTOF10: 8
PAINLEVEL_OUTOF10: 7

## 2023-07-13 ASSESSMENT — PAIN DESCRIPTION - DESCRIPTORS: DESCRIPTORS: SHOOTING

## 2023-07-13 ASSESSMENT — PAIN DESCRIPTION - LOCATION: LOCATION: SHOULDER

## 2023-07-13 ASSESSMENT — PAIN DESCRIPTION - ORIENTATION: ORIENTATION: RIGHT

## 2023-07-13 ASSESSMENT — PAIN - FUNCTIONAL ASSESSMENT: PAIN_FUNCTIONAL_ASSESSMENT: 0-10

## 2023-07-13 NOTE — ED PROVIDER NOTES
eMERGENCY dEPARTMENT eNCOUnter   [address]  Name: Liliane Henry  MRN: [id number]  [address] [facility], [place], operated by [facility] 1975  Date of evaluation: 7/13/23     Liliane Henry is a 49 y.o. male with CC: Shoulder Pain (Pt reports right shoulder/neck pain. States he thinks he slept on it wrong Tuesday and pain has been worsening since.)        This visit was performed by both a physician and an APC. I performed all aspects of the MDM as documented.       Josie Leon MD  Attending Emergency Physician            Josie Leon MD  76/32/46 4302
Status:   Standing     Number of Occurrences:   1     Order Specific Question:   Reason for exam:     Answer:   Pain x3 days. Thinks he may have slept on his shoulder wrong       The patient was involved in his/her plan of care through shared decision making. The testing that was ordered was discussed with the patient. Any medications that may have been ordered were discussed with the patient. I have reviewed the patient's previous medical records using the electronic health record that we have available. imaging were reviewed. Imaging was reviewed and reported by the radiologist. Results showed x-ray right shoulder no acute findings. Patient has musculoskeletal pain to the right trapezius right-sided neck area that extends to the right shoulder. No midline cervical tenderness. He was given Toradol in the ED and will discharge with prescription for symptom management. Discussed imaging results diagnosis follow-up care with the patient. Evaluation and treatment course in the ED, and plan of care upon discharge was discussed in length with the patient. Patient had no further questions prior to being discharged and was instructed to return to the ED for new or worsening symptoms. CONSULTS:  None    PROCEDURES:  Procedures    FINAL IMPRESSION      1. Strain of neck muscle, initial encounter    2. Strain of right shoulder, initial encounter            Problem List  Patient Active Problem List   Diagnosis Code    Hypertension I10    Rectal bleeding K62.5    GERD (gastroesophageal reflux disease) K21.9    Dyslipidemia E78.5    Lumbar strain S39.012A    Hematuria R31.9    Fracture of right orbital floor ([address]) S02. 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

## 2023-07-13 NOTE — DISCHARGE INSTRUCTIONS
Take medication as prescribed. Do not drive, operate heavy machinery, or consume alcohol with tactile codeine or Robaxin as it can cause drowsiness. Please follow-up with your primary care provider. Return to emergency department symptoms worsen.

## 2023-07-13 NOTE — TELEPHONE ENCOUNTER
Yes please, though he may need to go to a different location, as the current may not reschedule due to no-shows

## 2023-07-13 NOTE — ED NOTES
Pt resting on cot in no acute distress. All safety measures met. RR even and non labored. Pt denies any needs at this time.       Travis Woodson RN  07/13/23 3672

## 2023-07-13 NOTE — TELEPHONE ENCOUNTER
Patient has an upcoming appointment on 7/25/2023. He was just discharged from PT on 7/5/2023 due to no shows/ canceled appointments.     Would you like a new referral to be sent so he is able to obtain more PT?

## 2023-07-25 ENCOUNTER — OFFICE VISIT (OUTPATIENT)
Dept: NEUROSURGERY | Age: 48
End: 2023-07-25
Payer: MEDICAID

## 2023-07-25 VITALS
OXYGEN SATURATION: 97 % | WEIGHT: 243 LBS | BODY MASS INDEX: 38.14 KG/M2 | TEMPERATURE: 98.6 F | DIASTOLIC BLOOD PRESSURE: 78 MMHG | HEIGHT: 67 IN | SYSTOLIC BLOOD PRESSURE: 119 MMHG | HEART RATE: 94 BPM

## 2023-07-25 DIAGNOSIS — M54.12 CERVICAL RADICULOPATHY: Primary | ICD-10-CM

## 2023-07-25 PROCEDURE — 3078F DIAST BP <80 MM HG: CPT | Performed by: NURSE PRACTITIONER

## 2023-07-25 PROCEDURE — 1036F TOBACCO NON-USER: CPT | Performed by: NURSE PRACTITIONER

## 2023-07-25 PROCEDURE — G8417 CALC BMI ABV UP PARAM F/U: HCPCS | Performed by: NURSE PRACTITIONER

## 2023-07-25 PROCEDURE — 99213 OFFICE O/P EST LOW 20 MIN: CPT | Performed by: NURSE PRACTITIONER

## 2023-07-25 PROCEDURE — 3074F SYST BP LT 130 MM HG: CPT | Performed by: NURSE PRACTITIONER

## 2023-07-25 PROCEDURE — G8427 DOCREV CUR MEDS BY ELIG CLIN: HCPCS | Performed by: NURSE PRACTITIONER

## 2023-07-25 RX ORDER — CYCLOBENZAPRINE HCL 10 MG
10 TABLET ORAL 3 TIMES DAILY PRN
Qty: 21 TABLET | Refills: 0 | Status: SHIPPED | OUTPATIENT
Start: 2023-07-25 | End: 2023-08-04

## 2023-07-25 NOTE — PROGRESS NOTES
Brachioradialis 2+/4; R brachioradialis 2+/4  L Biceps 2+/4; R Biceps 2+/4  L Triceps 2+/4; R Triceps 2+/4  L Patellar 2+/4: R Patellar 2+/4  L Achilles 2+/4; R Achilles 2+/4    hoffmans L: neg  hoffmans R: neg  Clonus L: neg  Clonus R: neg  Babinski L: neg  Babinski R: neg    + Tenderness to palpation over right trapezius    Studies Review:     CT cervical 9/25/2022:  FINDINGS:  BONES/ALIGNMENT: There is mild reversal of the normal cervical lordosis  attributed to patient positioning. Vertebral body heights and bone density  are normal.     DEGENERATIVE CHANGES: Mild diffuse cervical spine degenerative changes with  possibly significant foraminal stenosis on the right at C4-5 and on the left  at C5-6. There is possibly significant central canal stenosis at C5-6. SOFT TISSUES: There is no prevertebral soft tissue swelling. ED notes reviewed    Assessment and Plan:      1. Cervical radiculopathy          Plan: Recent worsening of right sided cervical radiculopathy. Trial Flexeril. Referral provided for reinitiation of therapy. Will obtain updated cervical MRI given mobility limitation to right arm. Will defer prednisone secondary to diabetes. Patient to return in 6 weeks for reevaluation, sooner with any new or worsening symptoms. Followup: Return in about 6 weeks (around 9/5/2023), or if symptoms worsen or fail to improve.     Prescriptions Ordered:  Orders Placed This Encounter   Medications    cyclobenzaprine (FLEXERIL) 10 MG tablet     Sig: Take 1 tablet by mouth 3 times daily as needed for Muscle spasms     Dispense:  21 tablet     Refill:  0      Orders Placed:  Orders Placed This Encounter   Procedures    MRI CERVICAL SPINE WO CONTRAST     Standing Status:   Future     Standing Expiration Date:   7/25/2024    [facility]     Referral Priority:   Routine     Referral Type:   Eval and Treat     Referral Reason:   Specialty Services Required     Requested Specialty:

## 2023-08-02 ENCOUNTER — OFFICE VISIT (OUTPATIENT)
Dept: PODIATRY | Age: 48
End: 2023-08-02
Payer: MEDICAID

## 2023-08-02 VITALS — WEIGHT: 234 LBS | BODY MASS INDEX: 36.73 KG/M2 | HEIGHT: 67 IN

## 2023-08-02 DIAGNOSIS — D23.71 BENIGN NEOPLASM OF SKIN OF RIGHT FOOT: ICD-10-CM

## 2023-08-02 DIAGNOSIS — L60.0 INGROWN NAIL: ICD-10-CM

## 2023-08-02 DIAGNOSIS — M79.604 PAIN IN BOTH LOWER EXTREMITIES: ICD-10-CM

## 2023-08-02 DIAGNOSIS — M79.605 PAIN IN BOTH LOWER EXTREMITIES: ICD-10-CM

## 2023-08-02 DIAGNOSIS — B35.1 DERMATOPHYTOSIS OF NAIL: Primary | ICD-10-CM

## 2023-08-02 DIAGNOSIS — L85.3 XEROSIS CUTIS: ICD-10-CM

## 2023-08-02 DIAGNOSIS — D23.72 BENIGN NEOPLASM OF SKIN OF LEFT FOOT: ICD-10-CM

## 2023-08-02 PROCEDURE — G8427 DOCREV CUR MEDS BY ELIG CLIN: HCPCS | Performed by: PODIATRIST

## 2023-08-02 PROCEDURE — 11721 DEBRIDE NAIL 6 OR MORE: CPT | Performed by: PODIATRIST

## 2023-08-02 PROCEDURE — 99213 OFFICE O/P EST LOW 20 MIN: CPT | Performed by: PODIATRIST

## 2023-08-02 PROCEDURE — 17110 DESTRUCTION B9 LES UP TO 14: CPT | Performed by: PODIATRIST

## 2023-08-02 PROCEDURE — G8417 CALC BMI ABV UP PARAM F/U: HCPCS | Performed by: PODIATRIST

## 2023-08-02 PROCEDURE — 1036F TOBACCO NON-USER: CPT | Performed by: PODIATRIST

## 2023-08-02 NOTE — PROGRESS NOTES
thin, with flaky sloughing skin as well as decreased hair growth to the lower leg  Small red hemosiderin deposits seen dorsal foot   Musculoskeletal:     1st MPJ ROM decreased, Bilateral.  Muscle strength 5/5, Bilateral.  Pain present upon palpation of toenails 1-5, Bilateral. decreased medial longitudinal arch, Bilateral.  Ankle ROM decreased,Bilateral.    Dorsally contracted digits present digits 2, Bilateral.     Vascular: DP pulses 1/4 bilateral.  PT pulses 0/4 bilateral.   CFT <5 seconds, Bilateral.  Hair growth absent to the level of the digits, Bilateral.  Edema present, Bilateral.  Varicosities absent, Bilateral. Erythema absent, Bilateral    Neurological: Sensation diminshed to light touch to level of digits, Bilateral.  Protective sensation intact 6/10 sites via 5.07/10g [place]-Zoe Monofilament, Bilateral.  negative Tinel's, Bilateral.  negative Valleix sign, Bilateral.      Integument: Warm, dry, supple, Bilateral.  Open lesion absent, Bilateral.  Interdigital maceration absent to web spaces 4, Bilateral.  Nails 1-5 left and 1-5 right thickened > 3.0 mm, dystrophic and crumbly, discolored with subungual debris. Fissures absent, Bilateral.   General: AAO x 3 in NAD.     Derm  Toenail Description  Sites of Onychomycosis Involvement (Check affected area)  [x] [x] [x] [x] [x] [x] [x] [x] [x] [x]  5 4 3 2 1 1 2 3 4 5                          Right                                        Left    Thickness  [x] [x] [x] [x] [x] [x] [x] [x] [x] [x]  5 4 3 2 1 1 2 3 4 5                         Right                                        Left    Dystrophic Changes   [x] [x] [x] [x] [x] [x] [x] [x] [x] [x]  5 4 3 2 1 1 2 3 4 5                         Right                                        Left    Color   [x] [x] [x] [x] [x] [x] [x] [x] [x] [x]  5 4 3 2 1 1 2 3 4 5                          Right                                        Left    Incurvation/Ingrowin

## 2023-08-03 ENCOUNTER — HOSPITAL ENCOUNTER (OUTPATIENT)
Dept: PHYSICAL THERAPY | Age: 48
Setting detail: THERAPIES SERIES
Discharge: HOME | End: 2023-08-03
Payer: MEDICAID

## 2023-08-03 PROCEDURE — 97162 PT EVAL MOD COMPLEX 30 MIN: CPT

## 2023-08-14 RX ORDER — ATORVASTATIN CALCIUM 20 MG/1
TABLET, FILM COATED ORAL
Qty: 90 TABLET | Refills: 1 | Status: SHIPPED | OUTPATIENT
Start: 2023-08-14

## 2023-08-14 NOTE — TELEPHONE ENCOUNTER
CERVICAL SPINE WO CONTRAST Once 07/25/2023               Patient Active Problem List:     Hypertension     Rectal bleeding     GERD (gastroesophageal reflux disease)     Dyslipidemia     Lumbar strain     Hematuria     Fracture of right orbital floor (HCC)     Right medial orbital wall fracture (HCC)     Exophthalmos of right eye     Morbidly obese (HCC)     Lumbar radiculopathy     Polyneuropathy associated with underlying disease ([address] [facility])

## 2023-08-15 ENCOUNTER — HOSPITAL ENCOUNTER (OUTPATIENT)
Dept: PHYSICAL THERAPY | Age: 48
Setting detail: THERAPIES SERIES
Discharge: HOME | End: 2023-08-15
Payer: MEDICAID

## 2023-08-15 PROCEDURE — 97530 THERAPEUTIC ACTIVITIES: CPT

## 2023-08-15 PROCEDURE — 97110 THERAPEUTIC EXERCISES: CPT

## 2023-08-15 NOTE — FLOWSHEET NOTE
[x] [address]  [address] [facility].  [phone]  F: [phone] [] [address] [facility]  [address]   [address]  P: [phone]  F: [phone] [] [address]  [address] [facility]  P: [phone]  F: [phone] [] [facility]: [phone]  F: [phone] [] [address] [employer]  [address] [facility]   [address]   P: [phone]  F: [phone]  [] [address] [facility].   P: [phone]  F: [phone] [] [address] [facility]  [address]  P: [phone]  F: [phone] [] [address] [facility]  [address] [facility]  [place]: [phone]  F: [phone] [] [address] [place] [facility] [address]  [place]: [phone]  F: [phone]      Physical Therapy Daily Treatment Note    Date:  8/15/2023  Patient Name:  Juan Murphy    :  1975  MRN: [id number]   Physician: Erica Oates CNP                  Insurance: [facility]  Approval was received for 18 visits, from 23 to 10/31/23. Authorization number [id number]. Medical Diagnosis: M54.12 (ICD-10-CM) - Cervical radiculopathy               Rehab Codes: pain M54.2, M25.511, M54.12, myofascia M79.1, M62.838, posture R29.3, weakness M62.511  Onset Date: 23              Next 's appt. : ?  Visit# / total visits: 212      Cancels/No Shows: 0/0    Subjective:    Pain:  [x] Yes  [] No Location: neck and rt shoulder   Pain Rating: (0-10 scale) 7/10  Pain altered Tx:  [x] No  [] Yes  Action:  Comments: Continues constant

## 2023-08-17 ENCOUNTER — HOSPITAL ENCOUNTER (OUTPATIENT)
Dept: PHYSICAL THERAPY | Age: 48
Setting detail: THERAPIES SERIES
Discharge: HOME | End: 2023-08-17
Payer: MEDICAID

## 2023-08-17 PROCEDURE — 97140 MANUAL THERAPY 1/> REGIONS: CPT

## 2023-08-17 PROCEDURE — 97110 THERAPEUTIC EXERCISES: CPT

## 2023-08-24 ENCOUNTER — HOSPITAL ENCOUNTER (OUTPATIENT)
Dept: PHYSICAL THERAPY | Age: 48
Setting detail: THERAPIES SERIES
Discharge: HOME | End: 2023-08-24
Payer: MEDICAID

## 2023-08-24 PROCEDURE — 97140 MANUAL THERAPY 1/> REGIONS: CPT

## 2023-08-24 PROCEDURE — 97110 THERAPEUTIC EXERCISES: CPT

## 2023-08-24 NOTE — FLOWSHEET NOTE
[x] [address].  [phone]  F: [phone]     Physical Therapy Daily Treatment Note    Date:  2023  Patient Name:  Damion Marte      :  1975    MRN: [id number]   Physician: Marifer Melton CNP                    Insurance: [facility]  Approval was received for 18 visits, from 23 to 10/31/23. Authorization number [id number]. Medical Diagnosis: M54.12 (ICD-10-CM) - Cervical radiculopathy               Rehab Codes: pain M54.2, M25.511, M54.12, myofascia M79.1, M62.838, posture R29.3, weakness M62.511  Onset Date: 23                Next 's appt. : ?    Visit# / total visits:     Cancels/No Shows: 0/0    Subjective:    Pain:  [x] Yes  [] No Location: neck and rt shoulder     Pain Rating: (0-10 scale) 6/10  Pain altered Tx:  [x] No  [] Yes  Action:  Comments: Patient states pain is the same as it has been and continues in R neck/shoulder area. Objective:  Modalities: HP to rt upper trap seated with rt arm supported. Precautions:  Exercises:  Exercise Reps/ Time Weight/ Level Comments   Posture x  Reviewed in sitting with lumbar roll and why this aligned position is beneficial with retracted scapula and chin retracted, use of towel roll in pillow for neck support supine and sidelying.    trial pillow with roll, pillows under or between knees and pillow to support rt arm on side- feels better  (Thera act)   UBE- fwd/bkwd 5 min L1 added   Sitting      Chin retraction  10 x       Shoulder shrug 10 x     Scap retraction 10 x     Shrug/squeeze/ relax/release  10 x  added   pulley 10 x each      Scaption -  5 x     - AA with rt shoulder to avoid hiking and control decent   Passive Shoulder Flexion, w/eccentric lowering 10x  Very weak - difficulty facilitation deltoids    Slides on mat w/orange slide- row and circles both ways 15x ea   Added ball 2.2 lb instead of towel   3 Way Bicep curls 10x ea 3 lb     Wrist 3 way

## 2023-08-30 ENCOUNTER — HOSPITAL ENCOUNTER (OUTPATIENT)
Dept: MRI IMAGING | Age: 48
Discharge: HOME | End: 2023-09-01
Payer: MEDICAID

## 2023-08-30 DIAGNOSIS — M54.12 CERVICAL RADICULOPATHY: ICD-10-CM

## 2023-08-30 PROCEDURE — 72141 MRI NECK SPINE W/O DYE: CPT

## 2023-08-31 ENCOUNTER — HOSPITAL ENCOUNTER (OUTPATIENT)
Dept: PHYSICAL THERAPY | Age: 48
Setting detail: THERAPIES SERIES
Discharge: HOME | End: 2023-08-31
Payer: MEDICAID

## 2023-08-31 PROCEDURE — 97110 THERAPEUTIC EXERCISES: CPT

## 2023-08-31 PROCEDURE — 97112 NEUROMUSCULAR REEDUCATION: CPT

## 2023-08-31 NOTE — FLOWSHEET NOTE
[x] [address]  [address] [place] [address].  [phone]  F: [phone]     Physical Therapy Daily Treatment Note    Date:  2023  Patient Name:  Liliane Henry      :  1975    MRN: [id number]   Physician: Channing Chavarria CNP                    Insurance: [facility]  Approval was received for 18 visits, from 23 to 10/31/23. Authorization number [id number]. Medical Diagnosis: M54.12 (ICD-10-CM) - Cervical radiculopathy               Rehab Codes: pain M54.2, M25.511, M54.12, myofascia M79.1, M62.838, posture R29.3, weakness M62.511  Onset Date: 23                Next 's appt. : ?    Visit# / total visits:     Cancels/No Shows: 0/1    Subjective:    Pain:  [x] Yes  [] No Location: neck and rt shoulder     Pain Rating: (0-10 scale) 7/10  Pain altered Tx:  [x] No  [] Yes  Action:  Comments: Same pain upon arrival. Nothing has changed overall. Hot water helps while it is on, but then pain returns immediately. Objective:  Modalities: HP to rt upper trap seated with rt arm supported. Precautions:  Exercises:  Exercise Reps/ Time Weight/ Level Comments   Posture x  Reviewed in sitting with lumbar roll and why this aligned position is beneficial with retracted scapula and chin retracted, use of towel roll in pillow for neck support supine and sidelying.    trial pillow with roll, pillows under or between knees and pillow to support rt arm on side- feels better  (Thera act)   UBE- fwd/bkwd 5 min L1 added   Sitting      Chin retraction  15x3\"       Shoulder shrug 15x     Scap retraction 15x3\"     pulley 3 min  scaption   Scaption -  5 x     - AA with rt shoulder to avoid hiking and control decent   Passive Shoulder Flexion, w/eccentric lowering 10x  Very weak - difficulty facilitation deltoids    Slides on mat w/orange slide- row and circles both ways 15x ea   Added ball 2.2 lb instead of towel   3 Way Bicep curls 10x ea 3 lb     Wrist 3 way 20x ea 1

## 2023-09-06 ENCOUNTER — HOSPITAL ENCOUNTER (OUTPATIENT)
Dept: PHYSICAL THERAPY | Age: 48
Setting detail: THERAPIES SERIES
Discharge: HOME | End: 2023-09-06
Payer: MEDICAID

## 2023-09-06 PROCEDURE — 97112 NEUROMUSCULAR REEDUCATION: CPT

## 2023-09-06 PROCEDURE — 97110 THERAPEUTIC EXERCISES: CPT

## 2023-09-06 NOTE — FLOWSHEET NOTE
overhead into cupboard,    Neck Disability Index improves to 16% impaired or better  Patient to be independent with home exercise program as demonstrated by performance with correct form without cues. Patient goals: Making sure to stop pain and get my body back right    Pt. Education:  [x] Yes  [] No  [] Reviewed Prior HEP/Ed  Method of Education: [x] Verbal  [x] Demo as above [] Written  Comprehension of Education:  [x] Verbalizes understanding. [x] Demonstrates understanding. [x] Needs review. [] Demonstrates/verbalizes HEP/Ed previously given. Plan: [x] Continue current frequency toward long and short term goals.     [x] Specific Instructions for subsequent treatments:  Myofascial work to rt neck trap and shoulder, ROM and strength rt arm, scapular stability, Neck ROM, trial manual gentle cervical traction, Game Ready if shoulder pain increases            Time In: 1415        Time Out: 1500      Electronically signed by:  Amber Myles, PT

## 2023-09-14 ENCOUNTER — HOSPITAL ENCOUNTER (OUTPATIENT)
Dept: GENERAL RADIOLOGY | Age: 48
Discharge: HOME | End: 2023-09-16
Payer: MEDICAID

## 2023-09-14 ENCOUNTER — HOSPITAL ENCOUNTER (OUTPATIENT)
Age: 48
Discharge: HOME | End: 2023-09-16
Payer: MEDICAID

## 2023-09-14 ENCOUNTER — HOSPITAL ENCOUNTER (OUTPATIENT)
Dept: PHYSICAL THERAPY | Age: 48
Setting detail: THERAPIES SERIES
Discharge: HOME | End: 2023-09-14
Payer: MEDICAID

## 2023-09-14 ENCOUNTER — OFFICE VISIT (OUTPATIENT)
Dept: NEUROSURGERY | Age: 48
End: 2023-09-14
Payer: MEDICAID

## 2023-09-14 VITALS
DIASTOLIC BLOOD PRESSURE: 80 MMHG | HEART RATE: 101 BPM | WEIGHT: 240 LBS | BODY MASS INDEX: 37.67 KG/M2 | SYSTOLIC BLOOD PRESSURE: 118 MMHG | TEMPERATURE: 97 F | HEIGHT: 67 IN | OXYGEN SATURATION: 98 %

## 2023-09-14 DIAGNOSIS — M47.812 CERVICAL SPONDYLOSIS: ICD-10-CM

## 2023-09-14 DIAGNOSIS — M50.10 HERNIATION OF CERVICAL INTERVERTEBRAL DISC WITH RADICULOPATHY: Primary | ICD-10-CM

## 2023-09-14 DIAGNOSIS — M50.10 HERNIATION OF CERVICAL INTERVERTEBRAL DISC WITH RADICULOPATHY: ICD-10-CM

## 2023-09-14 DIAGNOSIS — R29.898 WEAKNESS OF RIGHT ARM: ICD-10-CM

## 2023-09-14 PROCEDURE — 99214 OFFICE O/P EST MOD 30 MIN: CPT | Performed by: NURSE PRACTITIONER

## 2023-09-14 PROCEDURE — 3074F SYST BP LT 130 MM HG: CPT | Performed by: NURSE PRACTITIONER

## 2023-09-14 PROCEDURE — G8427 DOCREV CUR MEDS BY ELIG CLIN: HCPCS | Performed by: NURSE PRACTITIONER

## 2023-09-14 PROCEDURE — G8417 CALC BMI ABV UP PARAM F/U: HCPCS | Performed by: NURSE PRACTITIONER

## 2023-09-14 PROCEDURE — 97112 NEUROMUSCULAR REEDUCATION: CPT

## 2023-09-14 PROCEDURE — 1036F TOBACCO NON-USER: CPT | Performed by: NURSE PRACTITIONER

## 2023-09-14 PROCEDURE — 97110 THERAPEUTIC EXERCISES: CPT

## 2023-09-14 PROCEDURE — 72040 X-RAY EXAM NECK SPINE 2-3 VW: CPT

## 2023-09-14 PROCEDURE — 3079F DIAST BP 80-89 MM HG: CPT | Performed by: NURSE PRACTITIONER

## 2023-09-14 NOTE — FLOWSHEET NOTE
[x] [address]  [address] [place] [address].  [phone]  F: [phone]     Physical Therapy Daily Treatment Note    Date:  2023  Patient Name:  Arnulfo Samuels      :  1975    MRN: [id number]   Physician: Chauncey Askew CNP                    Insurance: [facility]  Approval was received for 18 visits, from 23 to 10/31/23. Authorization number [id number]. Medical Diagnosis: M54.12 (ICD-10-CM) - Cervical radiculopathy               Rehab Codes: pain M54.2, M25.511, M54.12, myofascia M79.1, M62.838, posture R29.3, weakness M62.511  Onset Date: 23                Next 's appt.: 10/9 PCP;  Neuro    Visit# / total visits:     Cancels/No Shows: 0/3    Subjective:    Pain:  [x] Yes  [] No Location: neck and rt shoulder     Pain Rating: (0-10 scale) 7/10  Pain altered Tx:  [x] No  [] Yes  Action:  Comments: Patient reports he saw Neuro is to continue with PT to improve R UE/cervical ROM and strength. Objective:  Modalities: HP to rt upper trap seated with rt arm supported.  Held 23  Precautions:  Exercises:  Exercise Reps/ Time Weight/ Level  Comments   UBE- fwd/bkwd 5 min L1 X added          Sitting       Chin retraction  15x3\"        Shoulder shrug 15x      Scap retraction 15x3\"      pulley  min   scaption   Scaption -  5 x      - AA with rt shoulder to avoid hiking and control decent   Passive Shoulder Flexion, w/eccentric lowering 10x   Very weak - difficulty facilitation deltoids    Slides on mat w/orange slide- row and circles both ways 15x ea    Added ball 2.2 lb instead of towel   3 Way Bicep curls 10x ea 3 lb X  Standing 23   Wrist 3 way 20x ea 1 lb  Flexion, extension, radial deviation    Therabar 20x ea  X Added 23   CROM 10x ea   Rotation, flexion/extension, side bending    Cane Flexion w/Russian Stim 15 min 10 sec on / 10 sec off X Cane flexion (pvc pipe)          Standing       Bands:       - Lat Extension 20x

## 2023-09-14 NOTE — PROGRESS NOTES
[address] [facility] NEUROSURGERY  [address] [facility] [address]  Dept: [phone]    Patient:  Grace Castro  YOB: 1975  Date: 9/14/23    The patient is a 51 y.o. male who presents today for consult of the following problems:     Chief Complaint   Patient presents with    Shoulder Pain         HPI:     Grace Castro is a 51 y.o. male who presents the office for follow up of left arm pain, numbness, tingling. Symptoms started in July, no significant inciting event, thinks that he slept wrong. Woke up with painful numbness, tingling as well as difficulty lifting arm due to shoulder pain. Has been able to attend around 6 PT sessions. Still having numbness/tingling to right C5/6 distribution. Slight improvement to strength with PT measures, but still having weakness to right deltoid and bicep. Resents today for review of MRI. No left upper extremity issues, no lower extremity symptoms.     History:     Past Medical History:   Diagnosis Date    Dry skin     GERD (gastroesophageal reflux disease)     Hematuria     HLD (hyperlipidemia) 3/2/2015    Hypertension     AIDEN (obstructive sleep apnea)     Pre-diabetes     Sleep apnea     does not use a machine    Wears dentures     upper     Past Surgical History:   Procedure Laterality Date    ANKLE SURGERY Left     fracture    ARTHROPLASTY      4th and 5th toe on left foot    COLONOSCOPY  12/22/2014    normal    CYSTOSCOPY  03/21/2017    retrograde    CYSTOSCOPY Bilateral 3/21/2017    CYSTOSCOPY, RETROGRADE PYELOGRAM performed by Koby Zepeda MD at [facility] 03/03/2017    left foot 5th digit derotational arthroplasty     Family History   Problem Relation Age of Onset    High Blood Pressure Mother     Diabetes Father      Current Outpatient Medications on File Prior to Visit   Medication Sig Dispense Refill    atorvastatin (LIPITOR) 20 MG tablet take 1 tablet by mouth

## 2023-09-19 ENCOUNTER — TELEPHONE (OUTPATIENT)
Dept: NEUROSURGERY | Age: 48
End: 2023-09-19

## 2023-09-19 NOTE — TELEPHONE ENCOUNTER
----- Message from Sebago Dadds, APRN - CNP sent at 9/19/2023  7:49 AM EDT -----  Degenerative changes, no obvious instability.
Unable to reach patient no voicemail set up
show

## 2023-09-26 ENCOUNTER — HOSPITAL ENCOUNTER (OUTPATIENT)
Dept: PHYSICAL THERAPY | Age: 48
Setting detail: THERAPIES SERIES
Discharge: HOME | End: 2023-09-26
Payer: MEDICAID

## 2023-09-26 NOTE — FLOWSHEET NOTE
[x] [address]  [address] [place] [address].    [phone]  F: [phone]     Physical Therapy Cancel/No Show note    Date: 2023  Patient: Clare Cosby  : 1975  MRN: [id number]    Cancels/No Shows to date:     For today's appointment patient:    [x]  Cancelled    [] Rescheduled appointment    [] No-show     Reason given by patient:    []  Patient ill    []  Conflicting appointment    [] No transportation      [] Conflict with work    [] No reason given    [] Weather related    [] [id number]    [x] Other:      Comments:  Pt stated he had a  and unable to come stated he will attend on Thursday      [x] Next appointment was confirmed      Electronically signed by: Erich Perez PT

## 2023-09-28 ENCOUNTER — HOSPITAL ENCOUNTER (OUTPATIENT)
Dept: PHYSICAL THERAPY | Age: 48
Setting detail: THERAPIES SERIES
Discharge: HOME | End: 2023-09-28
Payer: MEDICAID

## 2023-09-28 PROCEDURE — 97110 THERAPEUTIC EXERCISES: CPT

## 2023-09-28 PROCEDURE — 97140 MANUAL THERAPY 1/> REGIONS: CPT

## 2023-09-28 NOTE — FLOWSHEET NOTE
[x] [address]  [address] [place] [address].  [phone]  F: [phone]     Physical Therapy Daily Treatment Note    Date:  2023  Patient Name:  Angelo Joel      :  1975    MRN: [id number]   Physician: Jigar Santiago CNP                    Insurance: [facility]  Approval was received for 18 visits, from 23 to 10/31/23. Authorization number [id number]. Medical Diagnosis: M54.12 (ICD-10-CM) - Cervical radiculopathy               Rehab Codes: pain M54.2, M25.511, M54.12, myofascia M79.1, M62.838, posture R29.3, weakness M62.511  Onset Date: 23                Next 's appt.: 10/9 PCP;  Neuro    Visit# / total visits:     Cancels/No Shows: 0/3    Subjective:    Pain:  [x] Yes  [] No Location: R UT area   Pain Rating: (0-10 scale) 8/10  Pain altered Tx:  [x] No  [] Yes  Action:  Comments: Patient reports he saw Neuro is to continue with PT to improve R UE/cervical ROM and strength. Objective:  Modalities: HP to rt upper trap seated with rt arm supported.  Held 23  Precautions:  Exercises:  Exercise Reps/ Time Weight/ Level  Comments   UBE- fwd/bkwd 5 min L1 X added          Sitting       Chin retraction  15x3\"        Shoulder shrug 15x      Scap retraction 15x3\"      pulley  min   scaption   Scaption -  5 x      - AA with rt shoulder to avoid hiking and control decent   Passive Shoulder Flexion, w/eccentric lowering 10x   Very weak - difficulty facilitation deltoids    Slides on mat w/orange slide- row and circles both ways 15x ea    Added ball 2.2 lb instead of towel   3 Way Bicep curls 10x ea 3 lb X  Standing 23   Wrist 3 way 20x ea 1 lb  Flexion, extension, radial deviation    Therabar 20x ea  X Added 23   CROM 10x ea   Rotation, flexion/extension, side bending    Cane Flexion w/Russian Stim 15 min 10 sec on / 10 sec off X Cane flexion (pvc pipe)          Standing       Bands:       - Lat Extension 20x Blue X Added

## 2023-10-03 ENCOUNTER — HOSPITAL ENCOUNTER (OUTPATIENT)
Dept: PHYSICAL THERAPY | Age: 48
Setting detail: THERAPIES SERIES
Discharge: HOME | End: 2023-10-03
Payer: MEDICAID

## 2023-10-03 PROCEDURE — 97110 THERAPEUTIC EXERCISES: CPT

## 2023-10-03 PROCEDURE — 97140 MANUAL THERAPY 1/> REGIONS: CPT

## 2023-10-03 NOTE — FLOWSHEET NOTE
[x] [address]  [address] [place] [address].  [phone]  F: [phone]     Physical Therapy Daily Treatment Note    Date:  10/3/2023  Patient Name:  Olivier Ta      :  1975    MRN: [id number]   Physician: Handy Barth CNP                    Insurance: [facility]  Approval was received for 18 visits, from 23 to 10/31/23. Authorization number [id number]. Medical Diagnosis: M54.12 (ICD-10-CM) - Cervical radiculopathy               Rehab Codes: pain M54.2, M25.511, M54.12, myofascia M79.1, M62.838, posture R29.3, weakness M62.511  Onset Date: 23                Next 's appt.: 10/9 PCP;  Neuro    Visit# / total visits:     Cancels/No Shows:     Subjective:    Pain:  [x] Yes  [] No Location: R UT area   Pain Rating: (0-10 scale) 7/10  Pain altered Tx:  [x] No  [] Yes  Action:  Comments: Overall continued pain in shoulder and R UT area. - unchanged, sees pain Dr tomorrow. States tingly and numb into rt arm to the thumb is constant.  .     Objective:  Modalities:    Precautions:  Exercises:  Exercise Reps/ Time Weight/ Level  Comments   UBE- fwd/bkwd 6 min L1 X  Inc time          Sitting       Chin retraction  15x3\"        Shoulder shrug 15x  X    Scap retraction 15x3\"  X    pulley 2 min  x scaption   Scaption -  5 x   X   - AA with rt shoulder to avoid hiking and control decent   Passive Shoulder Flexion, w/eccentric lowering 10x  X Very weak - difficulty facilitation deltoids    Slides on mat w/orange slide- row and circles both ways 15x ea    Added ball 2.2 lb instead of towel   3 Way Bicep curls 10x ea 3 lb X  Standing 23   Wrist 3 way 20x ea 2 lb X Flexion, extension, radial deviation    Sup/pron 20 x  x Added 10/3/23   Therabar 20x ea    Added 23   CROM 10x ea    Rotation, flexion/extension, side bending    Cane Flexion w/Russian Stim 15 min 10 sec on / 10 sec off  Cane flexion (pvc pipe)          Standing       Bands:

## 2023-10-04 ENCOUNTER — HOSPITAL ENCOUNTER (OUTPATIENT)
Dept: PAIN MANAGEMENT | Age: 48
Discharge: HOME | End: 2023-10-04
Payer: MEDICAID

## 2023-10-04 VITALS — TEMPERATURE: 97.3 F | WEIGHT: 240 LBS | HEIGHT: 67 IN | BODY MASS INDEX: 37.67 KG/M2

## 2023-10-04 DIAGNOSIS — E66.9 CLASS 2 OBESITY WITH BODY MASS INDEX (BMI) OF 37.0 TO 37.9 IN ADULT, UNSPECIFIED OBESITY TYPE, UNSPECIFIED WHETHER SERIOUS COMORBIDITY PRESENT: ICD-10-CM

## 2023-10-04 DIAGNOSIS — M54.12 CERVICAL RADICULOPATHY: Primary | ICD-10-CM

## 2023-10-04 DIAGNOSIS — M47.812 CERVICAL SPONDYLOSIS: ICD-10-CM

## 2023-10-04 DIAGNOSIS — M50.30 DEGENERATIVE DISC DISEASE, CERVICAL: ICD-10-CM

## 2023-10-04 PROBLEM — E66.812 CLASS 2 OBESITY WITH BODY MASS INDEX (BMI) OF 37.0 TO 37.9 IN ADULT: Status: ACTIVE | Noted: 2020-02-24

## 2023-10-04 PROCEDURE — 99203 OFFICE O/P NEW LOW 30 MIN: CPT

## 2023-10-04 PROCEDURE — 99204 OFFICE O/P NEW MOD 45 MIN: CPT | Performed by: STUDENT IN AN ORGANIZED HEALTH CARE EDUCATION/TRAINING PROGRAM

## 2023-10-04 ASSESSMENT — PAIN SCALES - GENERAL: PAINLEVEL_OUTOF10: 8

## 2023-10-04 NOTE — H&P (VIEW-ONLY)
Chronic Pain Clinic Note     Encounter Date: 10/4/2023     SUBJECTIVE:  Chief Complaint   Patient presents with    New Patient     Neck pain       History of Present Illness:   Olivire Ta is a 51 y.o. male who presents with neck pain    Medication Refill: n/a    Current Complaints of Pain:   Location: neck   Radiation: both arms  Severity: moderate  Pain Numerical Score - 8 today   Average:  8     Highest: 10  Lowest: 5  Character/Quality: Complains of pain that is aching  Timing: Constant  Associated symptoms: none  Numbness: yes  Weakness: no  Exacerbating factors:  turning head  Alleviating factors:  nothing  Length of time pain has been present: Started about a month and a half ago  Inciting event/injury: no  Bowel/Bladder incontinence: no  Falls: no  Physical Therapy: still going    History of Interventions:   Surgery: No previous lumbar/cervical surgeries  Injections: None    Imaging:    MRI Cervical 9/1/2023    FINDINGS:  BONES/ALIGNMENT: The vertebral body heights are maintained. There is  age-appropriate bone marrow signal.  There is multilevel degenerative disc  disease with loss of disc signal.  There is no disc space narrowing. There  is no spondylolisthesis. SPINAL CORD: No abnormal cord signal is seen. SOFT TISSUES: No paraspinal mass identified. C2-C3: There is no significant disc protrusion, spinal canal stenosis or  neural foraminal narrowing. C3-C4: There is no significant disc protrusion, spinal canal stenosis or  neural foraminal narrowing. C4-C5: There is a disc osteophyte complex with uncovertebral and facet  hypertrophy. There is canal stenosis measuring 8 mm in AP dimension. There  is moderate to severe left and severe right foraminal narrowing. C5-C6: There is a disc osteophyte complex with uncovertebral and facet  hypertrophy. There is canal stenosis measuring 8 mm in AP dimension. There  is severe bilateral foraminal narrowing.      C6-C7: There is no

## 2023-10-05 ENCOUNTER — HOSPITAL ENCOUNTER (OUTPATIENT)
Dept: PHYSICAL THERAPY | Age: 48
Setting detail: THERAPIES SERIES
Discharge: HOME | End: 2023-10-05
Payer: MEDICAID

## 2023-10-05 NOTE — FLOWSHEET NOTE
[x] [address]  [address] [place] [address].    [phone]  F: [phone]     Physical Therapy Cancel/No Show note    Date: 2023  Patient: Marlyn Gaytan  : 1975  MRN: [id number]    Cancels/No Shows to date:     For today's appointment patient:    [x]  Cancelled    [] Rescheduled appointment    [] No-show     Reason given by patient:    []  Patient ill    []  Conflicting appointment    [x] No transportation      [] Conflict with work    [] No reason given    [] Weather related    [] COVID-19    [x] Other:      Comments:  Pt stated  his car is in the shop and has no transportation.       [x] Next appointment was confirmed      Electronically signed by: Dorcas Golden PT

## 2023-10-10 ENCOUNTER — HOSPITAL ENCOUNTER (OUTPATIENT)
Dept: PHYSICAL THERAPY | Age: 48
Setting detail: THERAPIES SERIES
Discharge: HOME | End: 2023-10-10
Payer: MEDICAID

## 2023-10-10 PROCEDURE — 97110 THERAPEUTIC EXERCISES: CPT

## 2023-10-10 NOTE — PROGRESS NOTES
neck and upper trap, improve mechanics of rt shoulder/scap complex. STG: (to be met in 10 treatments)  ? Pain: neck/trap pain at 3/10 or less most times for increased tolerance to activities- MET  ? ROM: AROM Rt shoulder seated to at least 90 deg or better for more independent function- Met for flex but not abd,   ? Strength: Test at 3+-4 or better for UE strength in 2 motions or more for improved activity tolerance- MET for 2  ? Function: able to lower rt arm controlled after assist up over head- MET, be able to sleep for 5 or more hours or more- Not Met   Tight spasm muscles with only minimal tightness rt neck/trap- Not Met  LTG: (to be met in 18 treatments)  Able to reach head to work with hair, Able to reach overhead into cupboard,    Neck Disability Index improves to 16% impaired or better  Patient to be independent with home exercise program as demonstrated by performance with correct form without cues. Patient goals: Making sure to stop pain and get my body back right    Pt. Education:  [] Yes  [x] No  [] Reviewed Prior HEP/Ed  Method of Education: [] Verbal  [] Demo as above  [] Written  Comprehension of Education:  [] Verbalizes understanding. [] Demonstrates understanding. [] Needs review. [] Demonstrates/verbalizes HEP/Ed previously given. Plan: [x] Continue current frequency toward long and short term goals.     [x] Specific Instructions for subsequent treatments:  Myofascial work to rt neck trap and shoulder, ROM and strength rt arm, scapular stability, Neck ROM, trial manual gentle cervical traction, Game Ready if shoulder pain increases            Time In: 3:05 pm         Time Out: 4:00 pm      Electronically signed by:  Christopher Royal, PT

## 2023-10-11 ENCOUNTER — OFFICE VISIT (OUTPATIENT)
Dept: PODIATRY | Age: 48
End: 2023-10-11
Payer: MEDICAID

## 2023-10-11 DIAGNOSIS — L60.0 INGROWN NAIL: ICD-10-CM

## 2023-10-11 DIAGNOSIS — L84 HELOMA MOLLE: ICD-10-CM

## 2023-10-11 DIAGNOSIS — M79.604 PAIN IN BOTH LOWER EXTREMITIES: ICD-10-CM

## 2023-10-11 DIAGNOSIS — L85.3 XEROSIS CUTIS: ICD-10-CM

## 2023-10-11 DIAGNOSIS — M79.605 PAIN IN BOTH LOWER EXTREMITIES: ICD-10-CM

## 2023-10-11 DIAGNOSIS — B35.1 DERMATOPHYTOSIS OF NAIL: Primary | ICD-10-CM

## 2023-10-11 DIAGNOSIS — B35.3 TINEA PEDIS OF BOTH FEET: ICD-10-CM

## 2023-10-11 DIAGNOSIS — D23.71 BENIGN NEOPLASM OF SKIN OF RIGHT FOOT: ICD-10-CM

## 2023-10-11 DIAGNOSIS — R26.2 DIFFICULTY WALKING: ICD-10-CM

## 2023-10-11 DIAGNOSIS — D23.72 BENIGN NEOPLASM OF SKIN OF LEFT FOOT: ICD-10-CM

## 2023-10-11 PROCEDURE — G8427 DOCREV CUR MEDS BY ELIG CLIN: HCPCS | Performed by: PODIATRIST

## 2023-10-11 PROCEDURE — 99214 OFFICE O/P EST MOD 30 MIN: CPT | Performed by: PODIATRIST

## 2023-10-11 PROCEDURE — 11721 DEBRIDE NAIL 6 OR MORE: CPT | Performed by: PODIATRIST

## 2023-10-11 PROCEDURE — 1036F TOBACCO NON-USER: CPT | Performed by: PODIATRIST

## 2023-10-11 PROCEDURE — 17110 DESTRUCTION B9 LES UP TO 14: CPT | Performed by: PODIATRIST

## 2023-10-11 PROCEDURE — G8417 CALC BMI ABV UP PARAM F/U: HCPCS | Performed by: PODIATRIST

## 2023-10-11 PROCEDURE — G8484 FLU IMMUNIZE NO ADMIN: HCPCS | Performed by: PODIATRIST

## 2023-10-11 RX ORDER — UREA 40 %
CREAM (GRAM) TOPICAL
Qty: 1 EACH | Refills: 2 | Status: SHIPPED | OUTPATIENT
Start: 2023-10-11

## 2023-10-12 ENCOUNTER — HOSPITAL ENCOUNTER (OUTPATIENT)
Dept: PHYSICAL THERAPY | Age: 48
Setting detail: THERAPIES SERIES
Discharge: HOME | End: 2023-10-12
Payer: MEDICAID

## 2023-10-12 PROCEDURE — 97110 THERAPEUTIC EXERCISES: CPT

## 2023-10-12 PROCEDURE — 97140 MANUAL THERAPY 1/> REGIONS: CPT

## 2023-10-12 NOTE — FLOWSHEET NOTE
[x] [address]  [facility].  [phone]  F: [phone]     Physical Therapy Daily Treatment Note    Date:  10/12/2023  Patient Name:  Vaughn Walsh      :  1975    MRN: [id number]   Physician: Maye Mace CNP                    Insurance: [facility]  Approval was received for 18 visits, from 23 to 10/31/23. Authorization number [id number]. Medical Diagnosis: M54.12 (ICD-10-CM) - Cervical radiculopathy               Rehab Codes: pain M54.2, M25.511, M54.12, myofascia M79.1, M62.838, posture R29.3, weakness M62.511  Onset Date: 23                Next 's appt.: 10/9 PCP;  Neuro  Visit# / total visits:     Cancels/No Shows: 2/       Subjective:    Pain:  [x] Yes  [] No Location: R UT area   Pain Rating: (0-10 scale) 8/10  Pain altered Tx:  [x] No  [] Yes  Action:  Comments: Overall continued pain in shoulder and R UT area. - unchanged,   States tingly and numb into rt arm to the thumb continues to be constant. Pain Management - states injection plan for a cervical C7-T1 interlaminar epidural steroid injection on 10-17-23 .      Objective:  Modalities:    Precautions:  Exercises:  Exercise Reps/ Time Weight/ Level  Comments   UBE- fwd/bkwd 7 min L2 X  Inc time and level          Sitting       Chin retraction  15x3\"        Shoulder shrug 15x       Scap retraction 15x3\"      pulley 2 min  x scaption   Scaption -  5 x   X   - AA with rt shoulder to avoid hiking and control decent   Passive Shoulder Flexion, w/eccentric lowering 10x  X Very weak - difficulty facilitation deltoids    Slides on mat w/orange slide- row and circles both ways 15x ea    Added ball 2.2 lb instead of towel   3 Way Bicep curls 10x ea 3 lb        Wrist 3 way 10x ea 3lb    -Flexion, extension, radial deviation    Sup/pron 20 x       Therabar 20x ea        CROM 10x ea    Rotation, flexion/extension, side bending    Cane Flexion w/Russian Stim 15 min

## 2023-10-17 ENCOUNTER — HOSPITAL ENCOUNTER (OUTPATIENT)
Dept: PAIN MANAGEMENT | Facility: CLINIC | Age: 48
Discharge: HOME | End: 2023-10-17
Payer: MEDICAID

## 2023-10-17 ENCOUNTER — APPOINTMENT (OUTPATIENT)
Dept: PHYSICAL THERAPY | Age: 48
End: 2023-10-17
Payer: MEDICAID

## 2023-10-17 VITALS
DIASTOLIC BLOOD PRESSURE: 80 MMHG | BODY MASS INDEX: 37.67 KG/M2 | WEIGHT: 240 LBS | HEIGHT: 67 IN | OXYGEN SATURATION: 98 % | HEART RATE: 81 BPM | SYSTOLIC BLOOD PRESSURE: 126 MMHG | TEMPERATURE: 97.2 F | RESPIRATION RATE: 15 BRPM

## 2023-10-17 VITALS
DIASTOLIC BLOOD PRESSURE: 75 MMHG | OXYGEN SATURATION: 99 % | SYSTOLIC BLOOD PRESSURE: 143 MMHG | HEART RATE: 87 BPM | RESPIRATION RATE: 20 BRPM

## 2023-10-17 DIAGNOSIS — R52 PAIN MANAGEMENT: ICD-10-CM

## 2023-10-17 LAB — GLUCOSE BLD-MCNC: 117 MG/DL (ref 75–110)

## 2023-10-17 PROCEDURE — 2500000003 HC RX 250 WO HCPCS: Performed by: STUDENT IN AN ORGANIZED HEALTH CARE EDUCATION/TRAINING PROGRAM

## 2023-10-17 PROCEDURE — 6360000004 HC RX CONTRAST MEDICATION: Performed by: STUDENT IN AN ORGANIZED HEALTH CARE EDUCATION/TRAINING PROGRAM

## 2023-10-17 PROCEDURE — 82947 ASSAY GLUCOSE BLOOD QUANT: CPT

## 2023-10-17 PROCEDURE — 62321 NJX INTERLAMINAR CRV/THRC: CPT | Performed by: STUDENT IN AN ORGANIZED HEALTH CARE EDUCATION/TRAINING PROGRAM

## 2023-10-17 PROCEDURE — 6360000002 HC RX W HCPCS: Performed by: STUDENT IN AN ORGANIZED HEALTH CARE EDUCATION/TRAINING PROGRAM

## 2023-10-17 PROCEDURE — 62321 NJX INTERLAMINAR CRV/THRC: CPT

## 2023-10-17 PROCEDURE — 2580000003 HC RX 258: Performed by: STUDENT IN AN ORGANIZED HEALTH CARE EDUCATION/TRAINING PROGRAM

## 2023-10-17 RX ORDER — DEXAMETHASONE SODIUM PHOSPHATE 10 MG/ML
INJECTION, SOLUTION INTRAMUSCULAR; INTRAVENOUS
Status: COMPLETED | OUTPATIENT
Start: 2023-10-17 | End: 2023-10-17

## 2023-10-17 RX ORDER — LIDOCAINE HYDROCHLORIDE 10 MG/ML
INJECTION, SOLUTION EPIDURAL; INFILTRATION; INTRACAUDAL; PERINEURAL
Status: COMPLETED | OUTPATIENT
Start: 2023-10-17 | End: 2023-10-17

## 2023-10-17 RX ORDER — SODIUM CHLORIDE 0.9 % (FLUSH) 0.9 %
SYRINGE (ML) INJECTION
Status: COMPLETED | OUTPATIENT
Start: 2023-10-17 | End: 2023-10-17

## 2023-10-17 RX ADMIN — Medication 2 ML: at 12:00

## 2023-10-17 RX ADMIN — DEXAMETHASONE SODIUM PHOSPHATE 20 MG: 10 INJECTION, SOLUTION INTRAMUSCULAR; INTRAVENOUS at 12:00

## 2023-10-17 RX ADMIN — LIDOCAINE HYDROCHLORIDE 3 ML: 10 INJECTION, SOLUTION EPIDURAL; INFILTRATION; INTRACAUDAL at 11:58

## 2023-10-17 RX ADMIN — IOHEXOL 2 ML: 180 INJECTION INTRAVENOUS at 11:59

## 2023-10-17 ASSESSMENT — PAIN - FUNCTIONAL ASSESSMENT: PAIN_FUNCTIONAL_ASSESSMENT: PREVENTS OR INTERFERES SOME ACTIVE ACTIVITIES AND ADLS

## 2023-10-17 ASSESSMENT — PAIN SCALES - GENERAL
PAINLEVEL_OUTOF10: 8
PAINLEVEL_OUTOF10: 5

## 2023-10-17 ASSESSMENT — PAIN DESCRIPTION - LOCATION
LOCATION: NECK;ARM
LOCATION: NECK

## 2023-10-17 ASSESSMENT — PAIN DESCRIPTION - ONSET: ONSET: ON-GOING

## 2023-10-17 ASSESSMENT — PAIN DESCRIPTION - PAIN TYPE: TYPE: CHRONIC PAIN

## 2023-10-17 ASSESSMENT — PAIN DESCRIPTION - FREQUENCY: FREQUENCY: CONTINUOUS

## 2023-10-17 ASSESSMENT — PAIN DESCRIPTION - DESCRIPTORS: DESCRIPTORS: ACHING

## 2023-10-17 ASSESSMENT — PAIN DESCRIPTION - ORIENTATION: ORIENTATION: RIGHT

## 2023-10-17 NOTE — INTERVAL H&P NOTE
Update History & Physical    The patient's History and Physical of October 4, 2023 was reviewed with the patient and I examined the patient. There was no change. The surgical site was confirmed by the patient and me. Plan: The risks, benefits, expected outcome, and alternative to the recommended procedure have been discussed with the patient. Patient understands and wants to proceed with the procedure.      Electronically signed by Noa Pearson DO on 10/17/2023 at 11:46 AM

## 2023-10-17 NOTE — OP NOTE
insertion site was dressed appropriately. The patient did not experience any hemodynamic or neurological sequelae. The patient was transferred to the postoperative care unit in stable condition. Written discharge instructions were given to the patient. COMPLICATIONS:  There were no apparent complications. The patient tolerated the procedure well.

## 2023-10-17 NOTE — PROGRESS NOTES
[address] [facility],[address] PODIATRY [address] [place] [address]  Dept: [phone]  Dept Fax: [phone]     FOOT PAIN & BENIGN NEOPLASM PROGRESS NOTE  Date of patient's visit: 10/11/2023  Patient's Name:  Angelo Joel YOB: 1975            Patient Care Team:  TRES Perez CNP as PCP - General (Certified Nurse Practitioner)  TRES Perez CNP [facility]  Dorys Lance MD as Consulting Physician (Ophthalmology)  Yuly Crespo DPM as Physician (Podiatry)          Chief Complaint   Patient presents with    Diabetes     Diabetic foot care    Peripheral Neuropathy     Bilateral foot     Benign Neoplasm     Bilateral foot        Subjective: This Angelo Joel comes to clinic for foot and nail care. Pt currently has complaint of thickened, painful, elongated nails that he/she cannot manage by themselves. Pt. Relates pain to nails with shoe gear. Pt's primary care physician is TRES Perez - [id number] [place] [address] seen 2/20/23. Past Medical History:   Diagnosis Date    Dry skin     GERD (gastroesophageal reflux disease)     Hematuria     HLD (hyperlipidemia) 3/2/2015    Hypertension     AIDEN (obstructive sleep apnea)     Pre-diabetes     Sleep apnea     does not use a machine    Wears dentures     olegario Joel is a 51 y.o. male. Painful lesions his   feet. . They have been present for {NUMBERS 0-6:58713} {TIME FRAME:21287} duration. The lesions are present on the {RIGHT LEFT BILATERAL:93342} foot. The patient has {NUMBERS 0-6:89520} lesion that is deep seated and has a painful core. Treatment has included ***  Pt has a new complaint of ***.           No Known Allergies  Current Outpatient Medications on File Prior to Visit   Medication Sig Dispense Refill    atorvastatin (LIPITOR) 20 MG tablet take 1 tablet by mouth once daily 90 tablet 1    ibuprofen (IBU) 600 MG
[] [] [] [] [] [] [] [] [] []  5 4 3 2 1 1 2 3 4 5                         Right                                        Left    Inflammation/Pain   [x] [x] [x] [x] [x] [x] [x] [x] [x] [x]  5 4 3 2 1 1 2 3 4 5                         Right                                        Left        Visual inspection:  Deformity: hammertoe deformity jhonny feet  amputation: absent  Skin lesions: as above  Edema: right- 2+ pitting edema, left- 2+ pitting edema    Sensory exam:  Monofilament sensation: abnormal - 6/10 via SW 5.07/10g monofilament to the plantar foot bilateral feet    Pulses: abnormal - 1/4 dorsalis pedis pulse and 0/4 Posterior tibial pulse,   Pinprick: Impaired  Proprioception: Impaired  Vibration (128 Hz): Impaired       DM with PVD       [x]Yes    []No      Assessment:  51 y.o. male with:   Diagnosis Orders   1. Dermatophytosis of nail  91493 - [place] DEBRIDEMENT OF NAILS, 6 OR MORE    HM DIABETES FOOT EXAM      2. Benign neoplasm of skin of left foot  46233 - [place] DEBRIDEMENT OF NAILS, 6 OR MORE    HM DIABETES FOOT EXAM    74331 - [place] DESTRUCTION BENIGN LESIONS UP TO 14      3. Benign neoplasm of skin of right foot  54727 - [place] DEBRIDEMENT OF NAILS, 6 OR MORE    HM DIABETES FOOT EXAM    59476 - [place] DESTRUCTION BENIGN LESIONS UP TO 14      4. Pain in both lower extremities  39099 - [place] DEBRIDEMENT OF NAILS, 6 OR MORE    HM DIABETES FOOT EXAM    58034 - [place] DESTRUCTION BENIGN LESIONS UP TO 14      5. Xerosis cutis  56263 - [place] DEBRIDEMENT OF NAILS, 6 OR MORE    HM DIABETES FOOT EXAM      6. Ingrown nail  92247 - [place] DEBRIDEMENT OF NAILS, 6 OR MORE    HM DIABETES FOOT EXAM      7. Tinea pedis of both feet  83686 - [place] DEBRIDEMENT OF NAILS, 6 OR MORE    HM DIABETES FOOT EXAM      8. Heloma molle  29727 - [place] DEBRIDEMENT OF NAILS, 6 OR MORE    HM DIABETES FOOT EXAM    84046 - [place] DESTRUCTION BENIGN LESIONS UP TO 14      9.  Difficulty walking  45339 - [place] DEBRIDEMENT OF NAILS, 6 OR MORE    HM DIABETES FOOT EXAM            Q7   []Yes

## 2023-10-19 ENCOUNTER — HOSPITAL ENCOUNTER (OUTPATIENT)
Dept: PHYSICAL THERAPY | Age: 48
Setting detail: THERAPIES SERIES
Discharge: HOME | End: 2023-10-19
Payer: MEDICAID

## 2023-10-19 NOTE — FLOWSHEET NOTE
[x] [facility] ([facility]) [facility]  Therapy  [address] [facility].    [phone]  F: [phone]     Physical Therapy Cancel/No Show note    Date: 2023  Patient: Crystal Schroeder  : 1975  MRN: [id number]    Cancels/No Shows to date:     For today's appointment patient:    []  Cancelled    [] Rescheduled appointment    [x] No-show but he is post Pain Management injection and told to contact if  wants to hold PT after.      Reason given by patient:    []  Patient ill    []  Conflicting appointment    [] No transportation      [] Conflict with work    [] No reason given    [] Weather related    [] UELEO-19    [] Other:      Comments:   .      [x] Next appointment was confirmed  previously    Electronically signed by: Romeo Worthington PT

## 2023-10-24 ENCOUNTER — APPOINTMENT (OUTPATIENT)
Dept: PHYSICAL THERAPY | Age: 48
End: 2023-10-24
Payer: MEDICAID

## 2023-10-26 ENCOUNTER — HOSPITAL ENCOUNTER (OUTPATIENT)
Dept: PHYSICAL THERAPY | Age: 48
Setting detail: THERAPIES SERIES
Discharge: HOME | End: 2023-10-26
Payer: MEDICAID

## 2023-10-26 PROCEDURE — 97110 THERAPEUTIC EXERCISES: CPT

## 2023-10-26 NOTE — FLOWSHEET NOTE
sec on / 10 sec off  Cane flexion (pvc pipe)          Standing       Wall climb, hold up and lower off wall 10 x  X   flex, abd and 45 deg   Ball on the wall 2 min  X Added flex, 45 deg and abd positions   Bands: Made loop in band for handles   - Lat Extension 15 x Blue X     - Tricep Press 15 x Blue X    - Rows 15 x Blue X    - IR 15 x Blue X    - ER 15 x Blue X    - Bicep Curls 15 x Blue  X    - Flexion 3x15 Lime       - forward punch 15 x blue X           Supine       Cane ex- forward press  15x 3 lb  Inc wt  Focus on control of both arms    Cane ex   - flex 15x 3 lb      Cane ex - ER       Protraction  15 x 3 lb      AA/PROM rt shoulder  5 x ea     Good assist with horiz abd/add, bicp/tricep, IR/ER                             Treatment Charges: Mins Units   []  Modalities     [x]  Ther Exercise 50 3   []  Manual Therapy         []  Ther Activities     []  Neuro Re-ed     []  Vasocompression     [] Gait     []  Neuro Reeducation     Total Treatment time 50 3       Assessment: [x] Progressing toward goals. Focus on rt shoulder holding in elevated positions with controlled lowering. Added ball on wall for overhead endurance activity. [x] No change. Continued Significant shoulder hiking in rt with shoulder flex, abd or scaption. Weakness in C5-6 ie. Deltoid and supraspinatus still lacks ability to isolate, IR and ER can take manual resistance as does horiz abd/add, and bicep/triceps. [] Other:      [x] Patient would continue to benefit from skilled physical therapy services in order to: Learn proper posture for optimal alignment of neck and shoulder, increase ROM and strength of right shoulder, reduce spasm and tightness of rt neck and upper trap, improve mechanics of rt shoulder/scap complex. STG: (to be met in 10 treatments)  ?  Pain: neck/trap pain at 3/10 or less most times for increased tolerance to activities- MET  ? ROM: AROM Rt shoulder seated to at least 90 deg or better for more

## 2023-10-30 ENCOUNTER — HOSPITAL ENCOUNTER (OUTPATIENT)
Age: 48
Setting detail: SPECIMEN
Discharge: HOME | End: 2023-10-30

## 2023-10-30 ENCOUNTER — OFFICE VISIT (OUTPATIENT)
Dept: FAMILY MEDICINE CLINIC | Age: 48
End: 2023-10-30
Payer: MEDICAID

## 2023-10-30 VITALS
SYSTOLIC BLOOD PRESSURE: 104 MMHG | DIASTOLIC BLOOD PRESSURE: 64 MMHG | BODY MASS INDEX: 37.43 KG/M2 | OXYGEN SATURATION: 97 % | HEART RATE: 104 BPM | WEIGHT: 239 LBS

## 2023-10-30 DIAGNOSIS — E78.5 DYSLIPIDEMIA: ICD-10-CM

## 2023-10-30 DIAGNOSIS — E11.42 TYPE 2 DIABETES MELLITUS WITH DIABETIC POLYNEUROPATHY, WITHOUT LONG-TERM CURRENT USE OF INSULIN (HCC): Primary | ICD-10-CM

## 2023-10-30 DIAGNOSIS — Z12.5 SCREENING FOR MALIGNANT NEOPLASM OF PROSTATE: ICD-10-CM

## 2023-10-30 DIAGNOSIS — E11.42 TYPE 2 DIABETES MELLITUS WITH DIABETIC POLYNEUROPATHY, WITHOUT LONG-TERM CURRENT USE OF INSULIN (HCC): ICD-10-CM

## 2023-10-30 LAB
CREAT UR-MCNC: 123.9 MG/DL (ref 39–259)
HBA1C MFR BLD: 7 %
MICROALBUMIN UR-MCNC: <12 MG/L
MICROALBUMIN/CREAT UR-RTO: NORMAL MCG/MG CREAT

## 2023-10-30 PROCEDURE — 3051F HG A1C>EQUAL 7.0%<8.0%: CPT | Performed by: NURSE PRACTITIONER

## 2023-10-30 PROCEDURE — G8427 DOCREV CUR MEDS BY ELIG CLIN: HCPCS | Performed by: NURSE PRACTITIONER

## 2023-10-30 PROCEDURE — 1036F TOBACCO NON-USER: CPT | Performed by: NURSE PRACTITIONER

## 2023-10-30 PROCEDURE — 99214 OFFICE O/P EST MOD 30 MIN: CPT | Performed by: NURSE PRACTITIONER

## 2023-10-30 PROCEDURE — G8484 FLU IMMUNIZE NO ADMIN: HCPCS | Performed by: NURSE PRACTITIONER

## 2023-10-30 PROCEDURE — 3078F DIAST BP <80 MM HG: CPT | Performed by: NURSE PRACTITIONER

## 2023-10-30 PROCEDURE — 2022F DILAT RTA XM EVC RTNOPTHY: CPT | Performed by: NURSE PRACTITIONER

## 2023-10-30 PROCEDURE — 83036 HEMOGLOBIN GLYCOSYLATED A1C: CPT | Performed by: NURSE PRACTITIONER

## 2023-10-30 PROCEDURE — 3074F SYST BP LT 130 MM HG: CPT | Performed by: NURSE PRACTITIONER

## 2023-10-30 PROCEDURE — G8417 CALC BMI ABV UP PARAM F/U: HCPCS | Performed by: NURSE PRACTITIONER

## 2023-10-30 ASSESSMENT — ENCOUNTER SYMPTOMS
COUGH: 0
SHORTNESS OF BREATH: 0

## 2023-10-30 NOTE — PROGRESS NOTES
Crystal Schroeder (:  1975) is a 51 y.o. male,Established patient, here for evaluation of the following chief complaint(s):  Diabetes         ASSESSMENT/PLAN:  1. Type 2 diabetes mellitus with diabetic polyneuropathy, without long-term current use of insulin (HCC)  -     POCT glycosylated hemoglobin (Hb A1C)  -     CBC; Future  -     Comprehensive Metabolic Panel; Future  -     Microalbumin, Ur; Future  2. Dyslipidemia  -     Lipid, Fasting; Future  3. Screening for malignant neoplasm of prostate  -     PSA Screening; Future      No follow-ups on file. Subjective   SUBJECTIVE/OBJECTIVE:  Diabetes  Pertinent negatives for diabetes include no chest pain. Dm- is going to make his eye apt. A1c is 7. No problems with his meds. Ava Stacy does not want any medications added on, he is going to make some improvements to diet to bring his a1c down. Review of Systems   Constitutional:  Negative for chills and fever. Respiratory:  Negative for cough and shortness of breath. Cardiovascular:  Negative for chest pain and leg swelling. Objective   Vitals:    10/30/23 1626   BP: 104/64   Pulse: (!) 104   SpO2: 97%     Wt Readings from Last 3 Encounters:   10/30/23 108.4 kg (239 lb)   10/17/23 108.9 kg (240 lb)   10/04/23 108.9 kg (240 lb)       Physical Exam  Constitutional:       General: He is not in acute distress. Appearance: He is well-developed. HENT:      Head: Normocephalic. Right Ear: External ear normal.      Left Ear: External ear normal.   Cardiovascular:      Rate and Rhythm: Normal rate and regular rhythm. Heart sounds: Normal heart sounds. Pulmonary:      Effort: Pulmonary effort is normal.      Breath sounds: Normal breath sounds. Musculoskeletal:         General: Normal range of motion. Skin:     General: Skin is warm and dry. Neurological:      Mental Status: He is alert and oriented to person, place, and time.             An electronic signature was used to

## 2023-10-31 ENCOUNTER — HOSPITAL ENCOUNTER (OUTPATIENT)
Dept: PHYSICAL THERAPY | Age: 48
Setting detail: THERAPIES SERIES
Discharge: HOME | End: 2023-10-31
Payer: MEDICAID

## 2023-10-31 NOTE — FLOWSHEET NOTE
[x] [address]  [address] [facility].    [phone]  F: [phone]     Physical Therapy Cancel/No Show note    Date: 2023  Patient: Arnulfo Samuels  : 1975  MRN: [id number]    Cancels/No Shows to date: 3/5    For today's appointment patient:    [x]  Cancelled    [] Rescheduled appointment    [] No-show       Reason given by patient:    []  Patient ill    []  Conflicting appointment    [] No transportation      [] Conflict with work    [] No reason given    [] Weather related    [] COVID-19    [x] Other:      Comments:  States out of town but this is the end date for insurance- Will need Insurance approval to continue. Will call Pt when able to schedule more visits.       [] Next appointment was confirmed       Electronically signed by: Felicia Mcdaniels, PT

## 2023-10-31 NOTE — DISCHARGE SUMMARY
[x] [address]  [address] [facility].  [phone]  F: [phone] [] [address] [facility]  [address]   [address]  P: [phone]  F: [phone] [] [address]  [address] [facility]  P: [phone]  F: [phone] [] [place]: [phone]  F: [phone] [] [address] [facility]  [address] [facility]   [address]   P: [phone]  F: [phone]  [] [address] [facility].   P: [phone]  F: [phone] [] [address] [facility]  [address] [place]  [address]  P: [phone]  F: [phone] [] [address] [facility]  [address] [facility]  [place]: [phone]  F: [phone] [] [address]  [place]: [phone]  F: [phone]      Physical Therapy Discharge Note    Date: 10/31/2023      Patient: Darcie Aragon  : 1975  MRN: [id number]      Physician: John Varma CNP                    Insurance: [facility]  Approval was received for 18 visits, from 23 to 10/31/23. Authorization number [id number]. Medical Diagnosis: M54.12 (ICD-10-CM) - Cervical radiculopathy               Rehab Codes: pain M54.2, M25.511, M54.12, myofascia M79.1, M62.838, posture R29.3, weakness M62.511  Onset Date: 23                             Next 's appt.: 10/9 PCP;  Neuro  Visit# / total visits:                               Cancels/No Shows: 3  Date of initial visit: 8-3-23                Date of final visit: 10-26-23      Subjective:   At last attended treatment:  Pain:

## 2023-11-09 ENCOUNTER — HOSPITAL ENCOUNTER (OUTPATIENT)
Age: 48
Setting detail: SPECIMEN
Discharge: HOME | End: 2023-11-09

## 2023-11-09 ENCOUNTER — HOSPITAL ENCOUNTER (OUTPATIENT)
Dept: PHYSICAL THERAPY | Age: 48
Setting detail: THERAPIES SERIES
Discharge: HOME | End: 2023-11-09
Payer: MEDICAID

## 2023-11-09 DIAGNOSIS — E78.5 DYSLIPIDEMIA: ICD-10-CM

## 2023-11-09 DIAGNOSIS — E11.42 TYPE 2 DIABETES MELLITUS WITH DIABETIC POLYNEUROPATHY, WITHOUT LONG-TERM CURRENT USE OF INSULIN (HCC): ICD-10-CM

## 2023-11-09 DIAGNOSIS — Z12.5 SCREENING FOR MALIGNANT NEOPLASM OF PROSTATE: ICD-10-CM

## 2023-11-09 LAB
ALBUMIN SERPL-MCNC: 4.2 G/DL (ref 3.5–5.2)
ALBUMIN/GLOB SERPL: 1.3 {RATIO} (ref 1–2.5)
ALP SERPL-CCNC: 69 U/L (ref 40–129)
ALT SERPL-CCNC: 26 U/L (ref 5–41)
ANION GAP SERPL CALCULATED.3IONS-SCNC: 10 MMOL/L (ref 9–17)
AST SERPL-CCNC: 22 U/L
BILIRUB SERPL-MCNC: 0.5 MG/DL (ref 0.3–1.2)
BUN SERPL-MCNC: 10 MG/DL (ref 6–20)
CALCIUM SERPL-MCNC: 9.8 MG/DL (ref 8.6–10.4)
CHLORIDE SERPL-SCNC: 101 MMOL/L (ref 98–107)
CHOLEST SERPL-MCNC: 116 MG/DL
CHOLESTEROL/HDL RATIO: 3.1
CO2 SERPL-SCNC: 28 MMOL/L (ref 20–31)
CREAT SERPL-MCNC: 0.8 MG/DL (ref 0.7–1.2)
ERYTHROCYTE [DISTWIDTH] IN BLOOD BY AUTOMATED COUNT: 14.9 % (ref 11.8–14.4)
GFR SERPL CREATININE-BSD FRML MDRD: >60 ML/MIN/1.73M2
GLUCOSE SERPL-MCNC: 132 MG/DL (ref 70–99)
HCT VFR BLD AUTO: 42.1 % (ref 40.7–50.3)
HDLC SERPL-MCNC: 37 MG/DL
HGB BLD-MCNC: 13.5 G/DL (ref 13–17)
LDLC SERPL CALC-MCNC: 51 MG/DL (ref 0–130)
MCH RBC QN AUTO: 26.9 PG (ref 25.2–33.5)
MCHC RBC AUTO-ENTMCNC: 32.1 G/DL (ref 28.4–34.8)
MCV RBC AUTO: 83.9 FL (ref 82.6–102.9)
NRBC BLD-RTO: 0 PER 100 WBC
PLATELET # BLD AUTO: 280 K/UL (ref 138–453)
PMV BLD AUTO: 9.4 FL (ref 8.1–13.5)
POTASSIUM SERPL-SCNC: 4.3 MMOL/L (ref 3.7–5.3)
PROT SERPL-MCNC: 7.4 G/DL (ref 6.4–8.3)
PSA SERPL-MCNC: 0.54 NG/ML
RBC # BLD AUTO: 5.02 M/UL (ref 4.21–5.77)
SODIUM SERPL-SCNC: 139 MMOL/L (ref 135–144)
TRIGL SERPL-MCNC: 142 MG/DL
WBC OTHER # BLD: 6 K/UL (ref 3.5–11.3)

## 2023-11-09 PROCEDURE — 97110 THERAPEUTIC EXERCISES: CPT

## 2023-11-09 NOTE — FLOWSHEET NOTE
[x] [address].  [phone]  F: [phone]     Physical Therapy Daily Treatment Note    Date:  2023  Patient Name:  Watson Shultz      :  1975    MRN: [id number]   Physician: Lisette Olivarez CNP                    Insurance: [facility]  Approval was received for 18 visits, from 23 to 10/31/23. Authorization number [id number]. Approval was received for 18 visits, from 23 to 23. Authorization number [id number].  45879 Approved 72 Units  32405 Approved 72 Units  91039 Approved 72 Units  07501 Approved 72 units  64110 Approved 18 Units  96383 Approved 18 Units  Medical Diagnosis: M54.12 (ICD-10-CM) - Cervical radiculopathy               Rehab Codes: pain M54.2, M25.511, M54.12, myofascia M79.1, M62.838, posture R29.3, weakness M62.511  Onset Date: 23                Next 's appt.: 10/9 PCP;  Neuro  Visit# / total visits:  (+18)   Cancels/No Shows: 3/       Subjective:    Pain:  [x] Yes  [] No Location: R shoulder    Pain Rating: (0-10 scale) 7/10  Pain altered Tx:  [x] No  [] Yes  Action:  Comments:  Patient reports pain is unchanged in the neck. Patient reports cramping in the right hand.      Objective:  Modalities:    Precautions:  Exercises: Exercises completed noted with \"x\"  Exercise Reps/ Time Weight/ Level  Comments   UBE- fwd/bkwd 7 min L2 X            Sitting       Chin retraction  15x3\"        Shoulder shrug 15x       Scap retraction 15x3\"      pulley 2 min  X scaption   Scaption -  10x   X   - AA with rt shoulder to avoid hiking and control decent  Increased reps  - difficulty with not allowing compensatory mtions   Passive Shoulder Flexion, w/eccentric lowering 10x  X Very weak - difficulty facilitation deltoid    Slides on mat w/orange slide- row and circles both ways 15x ea    Added ball 2.2 lb instead of towel   3 Way Bicep curls 10x ea 3 lb        Wrist 3 way 10x ea 3lb

## 2023-11-14 ENCOUNTER — HOSPITAL ENCOUNTER (OUTPATIENT)
Dept: PHYSICAL THERAPY | Age: 48
Setting detail: THERAPIES SERIES
Discharge: HOME | End: 2023-11-14
Payer: MEDICAID

## 2023-11-14 PROCEDURE — 97110 THERAPEUTIC EXERCISES: CPT

## 2023-11-14 NOTE — FLOWSHEET NOTE
shoulder pain increases            Time In: 1:00 pm         Time Out: 152 pm      Electronically signed by:  Yojana Mclaughlin PTA

## 2023-11-16 ENCOUNTER — HOSPITAL ENCOUNTER (OUTPATIENT)
Dept: PHYSICAL THERAPY | Age: 48
Setting detail: THERAPIES SERIES
Discharge: HOME | End: 2023-11-16
Payer: MEDICAID

## 2023-11-16 NOTE — FLOWSHEET NOTE
[x] [address]  [address] [facility].    [phone]  F: [phone]     Physical Therapy Cancel/No Show note    Date: 2023  Patient: Lubna Savage  : 1975  MRN: [id number]    Cancels/No Shows to date:     For today's appointment patient:    [x]  Cancelled    [] Rescheduled appointment    [] No-show       Reason given by patient:    [x]  Patient ill    []  Conflicting appointment    [] No transportation      [] Conflict with work    [] No reason given    [] Weather related    [] COVID-19    [] Other:      Comments:  Patient called and states he does not feel well. States he will be here next week.        [x] Next appointment was confirmed, previously     Electronically signed by: Albino Larsen PT

## 2023-11-21 ENCOUNTER — HOSPITAL ENCOUNTER (OUTPATIENT)
Dept: PHYSICAL THERAPY | Age: 48
Setting detail: THERAPIES SERIES
Discharge: HOME | End: 2023-11-21
Payer: MEDICAID

## 2023-11-21 NOTE — FLOWSHEET NOTE
[x] [address]  [address] [facility].    [phone]  F: [phone]     Physical Therapy Cancel/No Show note    Date: 2023  Patient: Jayden Burch  : 1975  MRN: [id number]    Cancels/No Shows to date:     For today's appointment patient:    [x]  Cancelled    [] Rescheduled appointment    [] No-show       Reason given by patient:    [x]  Patient ill    []  Conflicting appointment    [] No transportation      [] Conflict with work    [] No reason given    [] Weather related    [] COVID-19    [] Other:      Comments:  Patient called and states he is still sick.         [x] Next appointment was confirmed, previously     Electronically signed by: Rachel Kim PT

## 2023-11-28 ENCOUNTER — HOSPITAL ENCOUNTER (OUTPATIENT)
Dept: PHYSICAL THERAPY | Age: 48
Setting detail: THERAPIES SERIES
Discharge: HOME | End: 2023-11-28
Payer: MEDICAID

## 2023-11-28 PROCEDURE — 97110 THERAPEUTIC EXERCISES: CPT

## 2023-11-28 NOTE — FLOWSHEET NOTE
[x] [address]  [address] [place] [address].  [phone]  F: [phone]     Physical Therapy Daily Treatment Note    Date:  2023  Patient Name:  Angelo Joel      :  1975    MRN: [id number]   Physician: Jigar Santiago CNP                    Insurance: [facility]  Approval was received for 18 visits, from 23 to 10/31/23. Authorization number [id number]. Approval was received for 18 visits, from 23 to 23. Authorization number [id number].  16285 Approved 72 Units  87349 Approved 72 Units  32369 Approved 72 Units  02347 Approved 72 units  32269 Approved 18 Units  49446 Approved 18 Units  Medical Diagnosis: M54.12 (ICD-10-CM) - Cervical radiculopathy               Rehab Codes: pain M54.2, M25.511, M54.12, myofascia M79.1, M62.838, posture R29.3, weakness M62.511  Onset Date: 23                Next 's appt.:   Neuro    Visit# / total visits: 15/36 (updated visit count per additional insurance auth 23)     Cancels/No Shows: 3/5       Subjective:    Pain:  [x] Yes  [] No Location: R shoulder    Pain Rating: (0-10 scale) 8/10  Pain altered Tx:  [x] No  [] Yes  Action:  Comments:  Patient arrives stating cold weather has the pain in the right shoulder is increased.      Objective:  Modalities:    Precautions:  Exercises: Exercises completed noted with \"x\"  Exercise Reps/ Time Weight/ Level  Comments   UBE- fwd/bkwd 7 min L2 X            Sitting       Chin retraction  15x3\"        Shoulder shrug 15x       Scap retraction 15x3\"      pulley 2 min  X scaption   Scaption -  10x   X   - AA with rt shoulder to avoid hiking and control decent  Increased reps  - difficulty with not allowing compensatory mtions   Passive Shoulder Flexion, w/eccentric lowering 10x   Very weak - difficulty facilitation deltoid    Slides on mat w/orange slide- row and circles both ways 15x ea    Added ball 2.2 lb instead of towel   3 Way Bicep curls 15x

## 2023-11-30 ENCOUNTER — HOSPITAL ENCOUNTER (OUTPATIENT)
Dept: PHYSICAL THERAPY | Age: 48
Setting detail: THERAPIES SERIES
Discharge: HOME | End: 2023-11-30
Payer: MEDICAID

## 2023-11-30 NOTE — FLOWSHEET NOTE
[x] [address]  [address] [place] [address].    [phone]  F: [phone]     Physical Therapy Cancel/No Show note    Date: 2023  Patient: Watson Shultz  : 1975  MRN: [id number]    Cancels/No Shows to date:     For today's appointment patient:    [x]  Cancelled    [] Rescheduled appointment    [] No-show       Reason given by patient:    []  Patient ill    [x]  Conflicting appointment    [] No transportation      [] Conflict with work    [] No reason given    [] Weather related    [] COVID-19    [x] Other:      Comments:  Patient called and states he has a conflict in schedule.       [x] Next appointment was confirmed, previously     Electronically signed by: Tarsha Finley, PT

## 2024-01-04 ENCOUNTER — OFFICE VISIT (OUTPATIENT)
Dept: NEUROSURGERY | Age: 49
End: 2024-01-04
Payer: MEDICAID

## 2024-01-04 VITALS
WEIGHT: 243 LBS | OXYGEN SATURATION: 96 % | TEMPERATURE: 98.7 F | HEART RATE: 90 BPM | BODY MASS INDEX: 38.06 KG/M2 | DIASTOLIC BLOOD PRESSURE: 69 MMHG | RESPIRATION RATE: 20 BRPM | SYSTOLIC BLOOD PRESSURE: 109 MMHG

## 2024-01-04 DIAGNOSIS — M54.12 CERVICAL RADICULOPATHY: Primary | ICD-10-CM

## 2024-01-04 PROCEDURE — G8484 FLU IMMUNIZE NO ADMIN: HCPCS | Performed by: NEUROLOGICAL SURGERY

## 2024-01-04 PROCEDURE — G8427 DOCREV CUR MEDS BY ELIG CLIN: HCPCS | Performed by: NEUROLOGICAL SURGERY

## 2024-01-04 PROCEDURE — 3078F DIAST BP <80 MM HG: CPT | Performed by: NEUROLOGICAL SURGERY

## 2024-01-04 PROCEDURE — 99214 OFFICE O/P EST MOD 30 MIN: CPT | Performed by: NEUROLOGICAL SURGERY

## 2024-01-04 PROCEDURE — G8417 CALC BMI ABV UP PARAM F/U: HCPCS | Performed by: NEUROLOGICAL SURGERY

## 2024-01-04 PROCEDURE — 3074F SYST BP LT 130 MM HG: CPT | Performed by: NEUROLOGICAL SURGERY

## 2024-01-04 PROCEDURE — 1036F TOBACCO NON-USER: CPT | Performed by: NEUROLOGICAL SURGERY

## 2024-01-04 NOTE — PROGRESS NOTES
Department of Neurosurgery                                                      Follow up visit      History Obtained From:  patient    CHIEF COMPLAINT:         Chief Complaint   Patient presents with    Follow-up       HISTORY OF PRESENT ILLNESS:       The patient is a 48 y.o. male who presents for follow up for herniation of cervical intervertebral disc with radiculopathy, cervical spondylosis, and weakness of right arm. This is my first time seeing this patient in clinic.    Patient reports right sided neck pain that travels down his right arm which he recently received injections for, but states that these were not helpful. He has had this neck pain that travels down his right arm for the last couple months. He admits to stiffness in his neck, especially noticeable with the colder weather. He is able to alleviate his cervical pain with hot showers. The pain is aggravated via intense motion and sometimes after the patient sleeps. He also reports that he has been having constant paresthesia in his right thumb for the past year. He denies any dizziness, loss of balance, clumsiness, or loss of finger dexterity during this time.    PAST MEDICAL HISTORY :       Past Medical History:        Diagnosis Date    Dry skin     GERD (gastroesophageal reflux disease)     Hematuria     HLD (hyperlipidemia) 3/2/2015    Hypertension     AIDEN (obstructive sleep apnea)     Pre-diabetes     Sleep apnea     does not use a machine    Wears dentures     upper       Past Surgical History:        Procedure Laterality Date    ANKLE SURGERY Left     fracture    ARTHROPLASTY      4th and 5th toe on left foot    COLONOSCOPY  12/22/2014    normal    CYSTOSCOPY  03/21/2017    retrograde    CYSTOSCOPY Bilateral 3/21/2017    CYSTOSCOPY, RETROGRADE PYELOGRAM performed by Sergei Rojas MD at [facility] OR    FOOT SURGERY Left 03/03/2017    left foot 5th digit derotational arthroplasty       Social History:   Social History     Socioeconomic 
pt working in [employer]/Other

## 2024-01-17 ENCOUNTER — OFFICE VISIT (OUTPATIENT)
Dept: PODIATRY | Age: 49
End: 2024-01-17
Payer: MEDICAID

## 2024-01-17 VITALS — HEIGHT: 67 IN | WEIGHT: 243 LBS | BODY MASS INDEX: 38.14 KG/M2

## 2024-01-17 DIAGNOSIS — M79.605 PAIN IN BOTH LOWER EXTREMITIES: ICD-10-CM

## 2024-01-17 DIAGNOSIS — B35.1 DERMATOPHYTOSIS OF NAIL: Primary | ICD-10-CM

## 2024-01-17 DIAGNOSIS — D23.71 BENIGN NEOPLASM OF SKIN OF RIGHT FOOT: ICD-10-CM

## 2024-01-17 DIAGNOSIS — M79.604 PAIN IN BOTH LOWER EXTREMITIES: ICD-10-CM

## 2024-01-17 DIAGNOSIS — D23.72 BENIGN NEOPLASM OF SKIN OF LEFT FOOT: ICD-10-CM

## 2024-01-17 DIAGNOSIS — L85.3 XEROSIS CUTIS: ICD-10-CM

## 2024-01-17 DIAGNOSIS — L84 HELOMA MOLLE: ICD-10-CM

## 2024-01-17 PROCEDURE — 99214 OFFICE O/P EST MOD 30 MIN: CPT | Performed by: PODIATRIST

## 2024-01-17 PROCEDURE — 11721 DEBRIDE NAIL 6 OR MORE: CPT | Performed by: PODIATRIST

## 2024-01-17 PROCEDURE — G8484 FLU IMMUNIZE NO ADMIN: HCPCS | Performed by: PODIATRIST

## 2024-01-17 PROCEDURE — 17110 DESTRUCTION B9 LES UP TO 14: CPT | Performed by: PODIATRIST

## 2024-01-17 PROCEDURE — G8417 CALC BMI ABV UP PARAM F/U: HCPCS | Performed by: PODIATRIST

## 2024-01-17 PROCEDURE — 1036F TOBACCO NON-USER: CPT | Performed by: PODIATRIST

## 2024-01-17 PROCEDURE — G8427 DOCREV CUR MEDS BY ELIG CLIN: HCPCS | Performed by: PODIATRIST

## 2024-01-17 RX ORDER — UREA 40 %
CREAM (GRAM) TOPICAL
Qty: 1 EACH | Refills: 2 | Status: SHIPPED | OUTPATIENT
Start: 2024-01-17

## 2024-01-17 RX ORDER — AMMONIUM LACTATE 12 G/100G
CREAM TOPICAL
Qty: 1 EACH | Refills: 3 | Status: SHIPPED | OUTPATIENT
Start: 2024-01-17

## 2024-01-17 NOTE — PROGRESS NOTES
[facility] PODIATRY [address]  [address]  [facility] [address]  Dept: [phone]  Dept Fax: [phone]    DIABETIC PROGRESS NOTE  Date of patient's visit: 1/17/2024  Patient's Name:  Asif Mistry YOB: 1975            Patient Care Team:  Usha Dwyer APRN - CNP as PCP - General (Certified Nurse Practitioner)  Usha Dwyer APRN - CNP as PCP - [facility]  Santino Chris MD as Consulting Physician (Ophthalmology)  Eden Alfaro DPM as Physician (Podiatry)          Chief Complaint   Patient presents with    Diabetes     Diabetic foot care    Peripheral Neuropathy     Bilateral feet       Subjective:   Asif Mistry comes to clinic for Diabetes (Diabetic foot care) and Peripheral Neuropathy (Bilateral feet)    he is a diabetic and states that he is doing well.  Pt currently has complaint of thickened, elongated nails that they cannot manage by themselves.   Pt's primary care physician is Usha Dwyer APRN - CNP last seen 10/30/2023   Pt's last blood sugar was unknown.  Pt has a new complaint of increased pain to right foot 4th digit interspace.  Pt has tried changing shoes but it has not helped the pain  Patient is taking over the counter medications with minimal relief.       Lab Results   Component Value Date    LABA1C 7.0 10/30/2023      Complains of numbness in the feet bilat.  Past Medical History:   Diagnosis Date    Dry skin     GERD (gastroesophageal reflux disease)     Hematuria     HLD (hyperlipidemia) 3/2/2015    Hypertension     AIDEN (obstructive sleep apnea)     Pre-diabetes     Sleep apnea     does not use a machine    Wears dentures     upper       No Known Allergies  Current Outpatient Medications on File Prior to Visit   Medication Sig Dispense Refill    Urea (CARMOL) 40 % cream Apply to affected area once daily 1 each 2    atorvastatin (LIPITOR) 20 MG tablet take 1 tablet

## 2024-02-28 RX ORDER — ATORVASTATIN CALCIUM 20 MG/1
TABLET, FILM COATED ORAL
Qty: 90 TABLET | Refills: 1 | Status: SHIPPED | OUTPATIENT
Start: 2024-02-28

## 2024-03-27 ENCOUNTER — OFFICE VISIT (OUTPATIENT)
Dept: PODIATRY | Age: 49
End: 2024-03-27
Payer: MEDICAID

## 2024-03-27 VITALS — HEIGHT: 67 IN | WEIGHT: 232 LBS | BODY MASS INDEX: 36.41 KG/M2

## 2024-03-27 DIAGNOSIS — L85.3 XEROSIS CUTIS: ICD-10-CM

## 2024-03-27 DIAGNOSIS — D23.71 BENIGN NEOPLASM OF SKIN OF RIGHT FOOT: ICD-10-CM

## 2024-03-27 DIAGNOSIS — M79.604 PAIN IN BOTH LOWER EXTREMITIES: ICD-10-CM

## 2024-03-27 DIAGNOSIS — M79.605 PAIN IN BOTH LOWER EXTREMITIES: ICD-10-CM

## 2024-03-27 DIAGNOSIS — L60.0 INGROWN NAIL: ICD-10-CM

## 2024-03-27 DIAGNOSIS — B35.1 DERMATOPHYTOSIS OF NAIL: Primary | ICD-10-CM

## 2024-03-27 DIAGNOSIS — D23.72 BENIGN NEOPLASM OF SKIN OF LEFT FOOT: ICD-10-CM

## 2024-03-27 PROCEDURE — 11721 DEBRIDE NAIL 6 OR MORE: CPT | Performed by: PODIATRIST

## 2024-03-27 PROCEDURE — G8484 FLU IMMUNIZE NO ADMIN: HCPCS | Performed by: PODIATRIST

## 2024-03-27 PROCEDURE — G8417 CALC BMI ABV UP PARAM F/U: HCPCS | Performed by: PODIATRIST

## 2024-03-27 PROCEDURE — G8427 DOCREV CUR MEDS BY ELIG CLIN: HCPCS | Performed by: PODIATRIST

## 2024-03-27 PROCEDURE — 99213 OFFICE O/P EST LOW 20 MIN: CPT | Performed by: PODIATRIST

## 2024-03-27 PROCEDURE — 17110 DESTRUCTION B9 LES UP TO 14: CPT | Performed by: PODIATRIST

## 2024-03-27 PROCEDURE — 1036F TOBACCO NON-USER: CPT | Performed by: PODIATRIST

## 2024-03-27 NOTE — PROGRESS NOTES
[facility] PODIATRY [address]  [address]  [place] [id number]  Dept: [phone]  Dept Fax: [phone]    DIABETIC NAIL & BENIGN NEOPLASM PROGRESS NOTE  Date of patient's visit: 3/27/2024  Patient's Name:  Asif Mistry YOB: 1975            Patient Care Team:  Usha Dwyer APRN - CNP as PCP - General (Certified Nurse Practitioner)  Usha Dwyer APRN - CNP as PCP - [facility]  Santino Chris MD as Consulting Physician (Ophthalmology)  Eden Alfaro DPM as Physician (Podiatry)          Chief Complaint   Patient presents with    Diabetes     Diabetic foot care   A1c and Bs unknown     Peripheral Neuropathy     Bilateral feet     Benign Neoplasm     Bilateral feet          Subjective:   Asif Mistry comes to clinic for Diabetes (Diabetic foot care /A1c and Bs unknown ), Peripheral Neuropathy (Bilateral feet ), and Benign Neoplasm (Bilateral feet )    he is a diabetic and states that needs toenails and callous trimmed today.  Pt currently has complaint of thickened, elongated nails that they cannot manage by themselves.  Pt's primary care physician is Usha Dwyer APRN - CNP last seen 10/3/23   Pt's last blood sugar was patient states unsure of blood sugar. Painful lesions on hisfeet.. They have been present for {NUMBERS 0-6:44955} {TIME FRAME:21287} duration.  The lesions are present on the {RIGHT LEFT BILATERAL:91780} foot. The patient has {NUMBERS 0-6:46443} lesion that is deep seated andhas a painful core. Treatment has included ***       Lab Results   Component Value Date    LABA1C 7.0 10/30/2023      Complains of numbness in the feet bilat.  Past Medical History:   Diagnosis Date    Dry skin     GERD (gastroesophageal reflux disease)     Hematuria     HLD (hyperlipidemia) 3/2/2015    Hypertension     AIDEN (obstructive sleep apnea)     Pre-diabetes     Sleep apnea     does not use a

## 2024-03-29 RX ORDER — AMMONIUM LACTATE 12 G/100G
CREAM TOPICAL
Qty: 385 G | Refills: 2 | Status: SHIPPED | OUTPATIENT
Start: 2024-03-29 | End: 2024-04-28

## 2024-03-29 NOTE — PROGRESS NOTES
[facility] PODIATRY [address]  [address]  [facility] [address]  Dept: [phone]  Dept Fax: [phone]    DIABETIC PROGRESS NOTE  Date of patient's visit: 3/29/2024  Patient's Name:  Asif Mistry YOB: 1975            Patient Care Team:  Usha Dwyer APRN - CNP as PCP - General (Certified Nurse Practitioner)  Usha Dwyer APRN - CNP as PCP - [facility]  Santino Chris MD as Consulting Physician (Ophthalmology)  Eden Alfaro DPM as Physician (Podiatry)          Chief Complaint   Patient presents with    Diabetes     Diabetic foot care   A1c and Bs unknown     Peripheral Neuropathy     Bilateral feet     Benign Neoplasm     Bilateral feet        Subjective:   Asif Mistry comes to clinic for Diabetes (Diabetic foot care /A1c and Bs unknown ), Peripheral Neuropathy (Bilateral feet ), and Benign Neoplasm (Bilateral feet )    he is a diabetic and states that he is doing well.  Pt currently has complaint of thickened, elongated nails that they cannot manage by themselves.   Pt's primary care physician is Usha Dwyer APRN - CNP last seen 10/30/2023   Pt's last blood sugar was unknown.  Pt has a new complaint of increased pain to jhonny foot skin lesions.  Pt has tried changing shoes but it has not helped the pain  Patient is taking over the counter medications with minimal relief.       Lab Results   Component Value Date    LABA1C 7.0 10/30/2023      Complains of numbness in the feet bilat.  Past Medical History:   Diagnosis Date    Dry skin     GERD (gastroesophageal reflux disease)     Hematuria     HLD (hyperlipidemia) 3/2/2015    Hypertension     AIDEN (obstructive sleep apnea)     Pre-diabetes     Sleep apnea     does not use a machine    Wears dentures     upper       No Known Allergies  Current Outpatient Medications on File Prior to Visit   Medication Sig Dispense Refill

## 2024-04-29 ENCOUNTER — OFFICE VISIT (OUTPATIENT)
Dept: FAMILY MEDICINE CLINIC | Age: 49
End: 2024-04-29
Payer: MEDICAID

## 2024-04-29 VITALS
WEIGHT: 238 LBS | OXYGEN SATURATION: 98 % | HEART RATE: 103 BPM | BODY MASS INDEX: 37.28 KG/M2 | SYSTOLIC BLOOD PRESSURE: 110 MMHG | DIASTOLIC BLOOD PRESSURE: 80 MMHG

## 2024-04-29 DIAGNOSIS — I10 PRIMARY HYPERTENSION: ICD-10-CM

## 2024-04-29 DIAGNOSIS — Z76.0 MEDICATION REFILL: ICD-10-CM

## 2024-04-29 DIAGNOSIS — E11.42 TYPE 2 DIABETES MELLITUS WITH DIABETIC POLYNEUROPATHY, WITHOUT LONG-TERM CURRENT USE OF INSULIN (HCC): Primary | ICD-10-CM

## 2024-04-29 LAB — HBA1C MFR BLD: 6.8 %

## 2024-04-29 PROCEDURE — 1036F TOBACCO NON-USER: CPT | Performed by: NURSE PRACTITIONER

## 2024-04-29 PROCEDURE — 3074F SYST BP LT 130 MM HG: CPT | Performed by: NURSE PRACTITIONER

## 2024-04-29 PROCEDURE — 3079F DIAST BP 80-89 MM HG: CPT | Performed by: NURSE PRACTITIONER

## 2024-04-29 PROCEDURE — G8427 DOCREV CUR MEDS BY ELIG CLIN: HCPCS | Performed by: NURSE PRACTITIONER

## 2024-04-29 PROCEDURE — 3046F HEMOGLOBIN A1C LEVEL >9.0%: CPT | Performed by: NURSE PRACTITIONER

## 2024-04-29 PROCEDURE — G8417 CALC BMI ABV UP PARAM F/U: HCPCS | Performed by: NURSE PRACTITIONER

## 2024-04-29 PROCEDURE — 2022F DILAT RTA XM EVC RTNOPTHY: CPT | Performed by: NURSE PRACTITIONER

## 2024-04-29 PROCEDURE — 99214 OFFICE O/P EST MOD 30 MIN: CPT | Performed by: NURSE PRACTITIONER

## 2024-04-29 PROCEDURE — 83036 HEMOGLOBIN GLYCOSYLATED A1C: CPT | Performed by: NURSE PRACTITIONER

## 2024-04-29 RX ORDER — LISINOPRIL AND HYDROCHLOROTHIAZIDE 12.5; 1 MG/1; MG/1
TABLET ORAL
Qty: 90 TABLET | Refills: 1 | Status: SHIPPED | OUTPATIENT
Start: 2024-04-29

## 2024-04-29 SDOH — ECONOMIC STABILITY: FOOD INSECURITY: WITHIN THE PAST 12 MONTHS, YOU WORRIED THAT YOUR FOOD WOULD RUN OUT BEFORE YOU GOT MONEY TO BUY MORE.: NEVER TRUE

## 2024-04-29 SDOH — ECONOMIC STABILITY: FOOD INSECURITY: WITHIN THE PAST 12 MONTHS, THE FOOD YOU BOUGHT JUST DIDN'T LAST AND YOU DIDN'T HAVE MONEY TO GET MORE.: NEVER TRUE

## 2024-04-29 SDOH — ECONOMIC STABILITY: INCOME INSECURITY: HOW HARD IS IT FOR YOU TO PAY FOR THE VERY BASICS LIKE FOOD, HOUSING, MEDICAL CARE, AND HEATING?: NOT HARD AT ALL

## 2024-04-29 ASSESSMENT — PATIENT HEALTH QUESTIONNAIRE - PHQ9
SUM OF ALL RESPONSES TO PHQ QUESTIONS 1-9: 2
2. FEELING DOWN, DEPRESSED OR HOPELESS: SEVERAL DAYS
1. LITTLE INTEREST OR PLEASURE IN DOING THINGS: SEVERAL DAYS
SUM OF ALL RESPONSES TO PHQ QUESTIONS 1-9: 2
SUM OF ALL RESPONSES TO PHQ9 QUESTIONS 1 & 2: 2

## 2024-04-29 ASSESSMENT — ENCOUNTER SYMPTOMS
SHORTNESS OF BREATH: 0
COUGH: 0

## 2024-04-29 NOTE — PROGRESS NOTES
Patient is present for 6 month f/u for DM  
REPORTED 03/25/2014       Lab Results   Component Value Date    PSA 0.54 11/09/2023         Physical Exam  Constitutional:       General: He is not in acute distress.     Appearance: He is well-developed.   HENT:      Head: Normocephalic.      Right Ear: External ear normal.      Left Ear: External ear normal.   Cardiovascular:      Rate and Rhythm: Normal rate and regular rhythm.      Heart sounds: Normal heart sounds.   Pulmonary:      Effort: Pulmonary effort is normal.      Breath sounds: Normal breath sounds.   Musculoskeletal:         General: Normal range of motion.   Skin:     General: Skin is warm and dry.   Neurological:      Mental Status: He is alert and oriented to person, place, and time.            An electronic signature was used to authenticate this note.    --TRES CUELLO - CNP

## 2024-06-05 ENCOUNTER — OFFICE VISIT (OUTPATIENT)
Dept: PODIATRY | Age: 49
End: 2024-06-05
Payer: MEDICAID

## 2024-06-05 VITALS — HEIGHT: 67 IN | BODY MASS INDEX: 37.35 KG/M2 | WEIGHT: 238 LBS

## 2024-06-05 DIAGNOSIS — M79.605 PAIN IN BOTH LOWER EXTREMITIES: ICD-10-CM

## 2024-06-05 DIAGNOSIS — D23.71 BENIGN NEOPLASM OF SKIN OF RIGHT FOOT: ICD-10-CM

## 2024-06-05 DIAGNOSIS — L85.3 XEROSIS CUTIS: ICD-10-CM

## 2024-06-05 DIAGNOSIS — B35.3 TINEA PEDIS OF BOTH FEET: ICD-10-CM

## 2024-06-05 DIAGNOSIS — R26.2 DIFFICULTY WALKING: ICD-10-CM

## 2024-06-05 DIAGNOSIS — B35.1 DERMATOPHYTOSIS OF NAIL: Primary | ICD-10-CM

## 2024-06-05 DIAGNOSIS — L84 HELOMA MOLLE: ICD-10-CM

## 2024-06-05 DIAGNOSIS — L60.0 INGROWN NAIL: ICD-10-CM

## 2024-06-05 DIAGNOSIS — M79.604 PAIN IN BOTH LOWER EXTREMITIES: ICD-10-CM

## 2024-06-05 DIAGNOSIS — D23.72 BENIGN NEOPLASM OF SKIN OF LEFT FOOT: ICD-10-CM

## 2024-06-05 PROCEDURE — G8427 DOCREV CUR MEDS BY ELIG CLIN: HCPCS | Performed by: PODIATRIST

## 2024-06-05 PROCEDURE — 11721 DEBRIDE NAIL 6 OR MORE: CPT | Performed by: PODIATRIST

## 2024-06-05 PROCEDURE — 99214 OFFICE O/P EST MOD 30 MIN: CPT | Performed by: PODIATRIST

## 2024-06-05 PROCEDURE — 17110 DESTRUCTION B9 LES UP TO 14: CPT | Performed by: PODIATRIST

## 2024-06-05 PROCEDURE — G8417 CALC BMI ABV UP PARAM F/U: HCPCS | Performed by: PODIATRIST

## 2024-06-05 PROCEDURE — 1036F TOBACCO NON-USER: CPT | Performed by: PODIATRIST

## 2024-06-05 NOTE — PROGRESS NOTES
[facility] PODIATRY [address]  [address]  [place] [id number]  Dept: [phone]  Dept Fax: [phone]    DIABETIC PROGRESS NOTE  Date of patient's visit: 6/5/2024  Patient's Name:  Asif Mistry YOB: 1975            Patient Care Team:  Usha Dwyer APRN - CNP as PCP - General (Certified Nurse Practitioner)  Usha Dwyer APRN - CNP as PCP [facility]  Santino Chris MD as Consulting Physician (Ophthalmology)  Eden Alfaro DPM as Physician (Podiatry)          Chief Complaint   Patient presents with    Diabetes     Diabetic foot care    Peripheral Neuropathy     Bilateral foot       Subjective:   Asif Mistry comes to clinic for Diabetes (Diabetic foot care) and Peripheral Neuropathy (Bilateral foot)    he is a diabetic and states that needs toenails trimmed today.  Pt currently has complaint of thickened, elongated nails that they cannot manage by themselves.   Pt's primary care physician is Usha Dwyer APRN - CNP last seen 4/29/24.   Pt's last blood sugar was 6.8-4/29/24.    Pt has a new complaint of increased to 4th webspace jhonny feet. States pain worse when walking in shoes. .       Lab Results   Component Value Date    LABA1C 6.8 04/29/2024      Complains of numbness in the feet bilat.  Past Medical History:   Diagnosis Date    Dry skin     GERD (gastroesophageal reflux disease)     Hematuria     HLD (hyperlipidemia) 3/2/2015    Hypertension     AIDEN (obstructive sleep apnea)     Pre-diabetes     Sleep apnea     does not use a machine    Wears dentures     upper       No Known Allergies  Current Outpatient Medications on File Prior to Visit   Medication Sig Dispense Refill    lisinopril-hydroCHLOROthiazide (PRINZIDE;ZESTORETIC) 10-12.5 MG per tablet TAKE 1 TABLET BY MOUTH ONCE DAILY 90 tablet 1    atorvastatin (LIPITOR) 20 MG tablet take 1 tablet by mouth once daily 90 tablet 1

## 2024-08-23 ENCOUNTER — OFFICE VISIT (OUTPATIENT)
Dept: PODIATRY | Age: 49
End: 2024-08-23
Payer: MEDICAID

## 2024-08-23 VITALS — WEIGHT: 238 LBS | BODY MASS INDEX: 37.35 KG/M2 | HEIGHT: 67 IN

## 2024-08-23 DIAGNOSIS — L85.3 XEROSIS CUTIS: ICD-10-CM

## 2024-08-23 DIAGNOSIS — M79.605 PAIN IN BOTH LOWER EXTREMITIES: ICD-10-CM

## 2024-08-23 DIAGNOSIS — D23.71 BENIGN NEOPLASM OF SKIN OF RIGHT FOOT: ICD-10-CM

## 2024-08-23 DIAGNOSIS — D23.72 BENIGN NEOPLASM OF SKIN OF LEFT FOOT: ICD-10-CM

## 2024-08-23 DIAGNOSIS — L84 HELOMA MOLLE: ICD-10-CM

## 2024-08-23 DIAGNOSIS — B35.1 DERMATOPHYTOSIS OF NAIL: Primary | ICD-10-CM

## 2024-08-23 DIAGNOSIS — M79.604 PAIN IN BOTH LOWER EXTREMITIES: ICD-10-CM

## 2024-08-23 PROCEDURE — 99214 OFFICE O/P EST MOD 30 MIN: CPT | Performed by: PODIATRIST

## 2024-08-23 PROCEDURE — 17110 DESTRUCTION B9 LES UP TO 14: CPT | Performed by: PODIATRIST

## 2024-08-23 PROCEDURE — 11721 DEBRIDE NAIL 6 OR MORE: CPT | Performed by: PODIATRIST

## 2024-08-23 PROCEDURE — 1036F TOBACCO NON-USER: CPT | Performed by: PODIATRIST

## 2024-08-23 PROCEDURE — G8417 CALC BMI ABV UP PARAM F/U: HCPCS | Performed by: PODIATRIST

## 2024-08-23 PROCEDURE — G8427 DOCREV CUR MEDS BY ELIG CLIN: HCPCS | Performed by: PODIATRIST

## 2024-08-23 RX ORDER — UREA 40 %
CREAM (GRAM) TOPICAL
Qty: 1 EACH | Refills: 2 | Status: SHIPPED | OUTPATIENT
Start: 2024-08-23

## 2024-08-23 NOTE — PROGRESS NOTES
[facility] PODIATRY [address]  [address]  [facility] [address]  Dept: [phone]  Dept Fax: [phone]    DIABETIC NAIL & BENIGN NEOPLASM PROGRESS NOTE  Date of patient's visit: 8/23/2024  Patient's Name:  Asif Mistry YOB: 1975            Patient Care Team:  Usha Dwyer APRN - CNP as PCP - General (Certified Nurse Practitioner)  Usha Dwyer APRN - CNP as PCP - Empaneled Provider  Santino Chris MD as Consulting Physician (Ophthalmology)  Eden Alfaro DPM as Physician (Podiatry)          Chief Complaint   Patient presents with    Diabetes     Diabetic foot care  A1C 6.8    Peripheral Neuropathy     Bl feet    Benign Neoplasm     Bl feet         Subjective:   Asif Mistry comes to clinic for Diabetes (Diabetic foot care/A1C 6.8), Peripheral Neuropathy (Bl feet), and Benign Neoplasm (Bl feet)    he is a diabetic and states that ***.  Pt currently has complaint of thickened, elongated nails that they cannot manage by themselves.  Pt's primary care physician is Usha Dwyer APRN - CNP last seen April 29 2024.   Pt's last blood sugar was  A1C 6.8. Painful lesions on his feet.. They have been present for 2 month(s) duration.  The lesions are present on the bilateral foot. The patient has 2 lesion that is deep seated andhas a painful core. Treatment has included  previous podiatry treatment.       Lab Results   Component Value Date    LABA1C 6.8 04/29/2024      Complains of numbness in the feet bilat.  Past Medical History:   Diagnosis Date    Dry skin     GERD (gastroesophageal reflux disease)     Hematuria     HLD (hyperlipidemia) 3/2/2015    Hypertension     AIDEN (obstructive sleep apnea)     Pre-diabetes     Sleep apnea     does not use a machine    Wears dentures     upper       No Known Allergies  Current Outpatient Medications on File Prior to Visit   Medication Sig Dispense Refill

## 2024-08-29 NOTE — PROGRESS NOTES
pitting edema, left- 2+ pitting edema    Sensory exam:  Monofilament sensation: abnormal - 6/10 via SW 5.07/10g monofilament to the plantar foot bilateral feet    Pulses: abnormal - 1/4 dorsalis pedis pulse and 0/4 Posterior tibial pulse,   Pinprick: Impaired  Proprioception: Impaired  Vibration (128 Hz): Impaired       DM with PVD       [x]Yes    []No      Assessment:  48 y.o. male with:   Diagnosis Orders   1. Dermatophytosis of nail  DEBRIDEMENT OF NAILS, 6 OR MORE    HM DIABETES FOOT EXAM      2. Benign neoplasm of skin of left foot  HM DIABETES FOOT EXAM    DESTRUC BENIGN LESION, UP TO 14 LESIONS      3. Benign neoplasm of skin of right foot  HM DIABETES FOOT EXAM    DESTRUC BENIGN LESION, UP TO 14 LESIONS      4. Pain in both lower extremities  DEBRIDEMENT OF NAILS, 6 OR MORE    HM DIABETES FOOT EXAM    DESTRUC BENIGN LESION, UP TO 14 LESIONS      5. Xerosis cutis  DEBRIDEMENT OF NAILS, 6 OR MORE    HM DIABETES FOOT EXAM      6. Heloma molle  DEBRIDEMENT OF NAILS, 6 OR MORE    HM DIABETES FOOT EXAM            Q7   []Yes    []No                Q8   [x]Yes    []No                     Q9   []Yes    []No    Plan:   Pt was evaluated and examined. Patient was given personalized discharge instructions.  The lesions were partially excised via 15 blade and silver nitrate was applied under occlusion.  The patient tolerated the procedure well and without complication.  Advised patient to use vasoline to the area after tomorrow to prevent surrounding tissue irritation.     Discussed surgical correction of helloma mole including arthroplasty with excision of skin lesions. Surgery recommended as last treatment option. Dispensed toe spacer to be used daily when in shoes.    Nails 1-10 were debrided sharply in length and thickness with a nipper and , without incident. All labs were reviewed and all imaging including the above findings were reviewed PRIOR to the patients arrival and with the patient today.    Previous  patient encounter was reviewed.  Encounters from the patients other medical providers were reviewed and noted.   I personally interpreted the imaging and labs and discussed the results with the patient. Time was spent educating the patient and their families/caregivers on proper care of the feet and ankles.  All the above diagnosis were addressed at todays visit and all questions were answered.  A total of 30 minutes was spent with this patients encounter which included charting after the patients visit    Pt will follow up in 2 months or sooner if any problems arise. Diagnosis was discussed with the pt and all of their questions were answered in detail. Proper foot hygiene and care was discussed with the pt. Informed patient on proper diabetic foot care and importance of tight glycemic control.  Patient to check feet daily and contact the office with any questions/problems/concerns.   Other comorbidity noted and will be managed by PCP.  8/23/2024    Electronically signed by Eden Alfaro DPM on 8/28/2024 at 10:58 PM  8/23/2024

## 2024-10-11 RX ORDER — ATORVASTATIN CALCIUM 20 MG/1
TABLET, FILM COATED ORAL
Qty: 90 TABLET | Refills: 1 | Status: SHIPPED | OUTPATIENT
Start: 2024-10-11

## 2024-11-05 ENCOUNTER — HOSPITAL ENCOUNTER (OUTPATIENT)
Age: 49
Setting detail: SPECIMEN
Discharge: HOME | End: 2024-11-05

## 2024-11-05 ENCOUNTER — OFFICE VISIT (OUTPATIENT)
Dept: FAMILY MEDICINE CLINIC | Age: 49
End: 2024-11-05
Payer: MEDICAID

## 2024-11-05 VITALS
DIASTOLIC BLOOD PRESSURE: 74 MMHG | BODY MASS INDEX: 35.87 KG/M2 | HEART RATE: 90 BPM | SYSTOLIC BLOOD PRESSURE: 122 MMHG | WEIGHT: 229 LBS | OXYGEN SATURATION: 94 %

## 2024-11-05 DIAGNOSIS — Z76.0 MEDICATION REFILL: ICD-10-CM

## 2024-11-05 DIAGNOSIS — E78.5 DYSLIPIDEMIA: ICD-10-CM

## 2024-11-05 DIAGNOSIS — E11.42 TYPE 2 DIABETES MELLITUS WITH DIABETIC POLYNEUROPATHY, WITHOUT LONG-TERM CURRENT USE OF INSULIN (HCC): ICD-10-CM

## 2024-11-05 DIAGNOSIS — I10 PRIMARY HYPERTENSION: ICD-10-CM

## 2024-11-05 DIAGNOSIS — E78.5 DYSLIPIDEMIA: Primary | ICD-10-CM

## 2024-11-05 DIAGNOSIS — Z12.5 SCREENING FOR MALIGNANT NEOPLASM OF PROSTATE: ICD-10-CM

## 2024-11-05 LAB
ALBUMIN SERPL-MCNC: 4.4 G/DL (ref 3.5–5.2)
ALBUMIN/GLOB SERPL: 1.6 {RATIO} (ref 1–2.5)
ALP SERPL-CCNC: 59 U/L (ref 40–129)
ALT SERPL-CCNC: 35 U/L (ref 10–50)
ANION GAP SERPL CALCULATED.3IONS-SCNC: 12 MMOL/L (ref 9–16)
AST SERPL-CCNC: 37 U/L (ref 10–50)
BILIRUB SERPL-MCNC: 0.6 MG/DL (ref 0–1.2)
BUN SERPL-MCNC: 7 MG/DL (ref 6–20)
CALCIUM SERPL-MCNC: 9.5 MG/DL (ref 8.6–10.4)
CHLORIDE SERPL-SCNC: 99 MMOL/L (ref 98–107)
CHOLEST SERPL-MCNC: 116 MG/DL (ref 0–199)
CHOLESTEROL/HDL RATIO: 3.1
CO2 SERPL-SCNC: 27 MMOL/L (ref 20–31)
CREAT SERPL-MCNC: 0.8 MG/DL (ref 0.7–1.2)
CREAT UR-MCNC: 140 MG/DL (ref 39–259)
ERYTHROCYTE [DISTWIDTH] IN BLOOD BY AUTOMATED COUNT: 16.3 % (ref 11.8–14.4)
GFR, ESTIMATED: >90 ML/MIN/1.73M2
GLUCOSE SERPL-MCNC: 118 MG/DL (ref 74–99)
HBA1C MFR BLD: 6.4 %
HCT VFR BLD AUTO: 41.3 % (ref 40.7–50.3)
HDLC SERPL-MCNC: 38 MG/DL
HGB BLD-MCNC: 13.3 G/DL (ref 13–17)
LDLC SERPL CALC-MCNC: 63 MG/DL (ref 0–100)
MCH RBC QN AUTO: 28.2 PG (ref 25.2–33.5)
MCHC RBC AUTO-ENTMCNC: 32.2 G/DL (ref 28.4–34.8)
MCV RBC AUTO: 87.5 FL (ref 82.6–102.9)
MICROALBUMIN UR-MCNC: <12 MG/L (ref 0–20)
MICROALBUMIN/CREAT UR-RTO: NORMAL MCG/MG CREAT (ref 0–17)
NRBC BLD-RTO: 0 PER 100 WBC
PLATELET # BLD AUTO: 188 K/UL (ref 138–453)
PMV BLD AUTO: 9.9 FL (ref 8.1–13.5)
POTASSIUM SERPL-SCNC: 4 MMOL/L (ref 3.7–5.3)
PROT SERPL-MCNC: 7.2 G/DL (ref 6.6–8.7)
PSA SERPL-MCNC: 0.5 NG/ML (ref 0–4)
RBC # BLD AUTO: 4.72 M/UL (ref 4.21–5.77)
SODIUM SERPL-SCNC: 138 MMOL/L (ref 136–145)
TRIGL SERPL-MCNC: 74 MG/DL (ref 0–149)
VLDLC SERPL CALC-MCNC: 15 MG/DL (ref 1–30)
WBC OTHER # BLD: 6.5 K/UL (ref 3.5–11.3)

## 2024-11-05 PROCEDURE — G8417 CALC BMI ABV UP PARAM F/U: HCPCS | Performed by: NURSE PRACTITIONER

## 2024-11-05 PROCEDURE — 83036 HEMOGLOBIN GLYCOSYLATED A1C: CPT | Performed by: NURSE PRACTITIONER

## 2024-11-05 PROCEDURE — 3017F COLORECTAL CA SCREEN DOC REV: CPT | Performed by: NURSE PRACTITIONER

## 2024-11-05 PROCEDURE — 3074F SYST BP LT 130 MM HG: CPT | Performed by: NURSE PRACTITIONER

## 2024-11-05 PROCEDURE — 99214 OFFICE O/P EST MOD 30 MIN: CPT | Performed by: NURSE PRACTITIONER

## 2024-11-05 PROCEDURE — 1036F TOBACCO NON-USER: CPT | Performed by: NURSE PRACTITIONER

## 2024-11-05 PROCEDURE — 3078F DIAST BP <80 MM HG: CPT | Performed by: NURSE PRACTITIONER

## 2024-11-05 PROCEDURE — G8427 DOCREV CUR MEDS BY ELIG CLIN: HCPCS | Performed by: NURSE PRACTITIONER

## 2024-11-05 PROCEDURE — G8484 FLU IMMUNIZE NO ADMIN: HCPCS | Performed by: NURSE PRACTITIONER

## 2024-11-05 PROCEDURE — 2022F DILAT RTA XM EVC RTNOPTHY: CPT | Performed by: NURSE PRACTITIONER

## 2024-11-05 PROCEDURE — 3044F HG A1C LEVEL LT 7.0%: CPT | Performed by: NURSE PRACTITIONER

## 2024-11-05 RX ORDER — LISINOPRIL AND HYDROCHLOROTHIAZIDE 10; 12.5 MG/1; MG/1
TABLET ORAL
Qty: 90 TABLET | Refills: 1 | Status: SHIPPED | OUTPATIENT
Start: 2024-11-05

## 2024-11-05 ASSESSMENT — ENCOUNTER SYMPTOMS
COUGH: 0
SHORTNESS OF BREATH: 0

## 2024-11-05 NOTE — PROGRESS NOTES
Asif Mistry (:  1975) is a 49 y.o. male,Established patient, here for evaluation of the following chief complaint(s):  Diabetes and 6 Month Follow-Up         Assessment & Plan  Dyslipidemia       Orders:    Lipid, Fasting; Future    Type 2 diabetes mellitus with diabetic polyneuropathy, without long-term current use of insulin (HCC)   Chronic, at goal (stable), continue current treatment plan    Orders:    POCT glycosylated hemoglobin (Hb A1C)    CBC; Future    Comprehensive Metabolic Panel; Future    Microalbumin, Ur; Future    Screening for malignant neoplasm of prostate       Orders:    PSA Screening; Future    Medication refill       Orders:    lisinopril-hydroCHLOROthiazide (PRINZIDE;ZESTORETIC) 10-12.5 MG per tablet; TAKE 1 TABLET BY MOUTH ONCE DAILY    Primary hypertension   Chronic, at goal (stable), continue current treatment plan    Orders:    lisinopril-hydroCHLOROthiazide (PRINZIDE;ZESTORETIC) 10-12.5 MG per tablet; TAKE 1 TABLET BY MOUTH ONCE DAILY      No follow-ups on file.       Subjective   Diabetes  Pertinent negatives for diabetes include no chest pain.     Pt complain of arm and leg cramping while sleeping. States drinking enough h20.  No color change.  Getting enough exercise.     Declines flu shot      Review of Systems   Constitutional:  Negative for chills and fever.   Respiratory:  Negative for cough and shortness of breath.    Cardiovascular:  Negative for chest pain and leg swelling.     Objective   Vitals:    24 1311   BP: 122/74   Pulse: 90   SpO2: 94%     Wt Readings from Last 3 Encounters:   24 103.9 kg (229 lb)   24 108 kg (238 lb)   24 108 kg (238 lb)       Physical Exam  Constitutional:       General: He is not in acute distress.     Appearance: He is well-developed.   HENT:      Head: Normocephalic.      Right Ear: External ear normal.      Left Ear: External ear normal.   Cardiovascular:      Rate and Rhythm: Normal rate and regular rhythm.

## 2024-11-05 NOTE — ASSESSMENT & PLAN NOTE
Chronic, at goal (stable), continue current treatment plan    Orders:    lisinopril-hydroCHLOROthiazide (PRINZIDE;ZESTORETIC) 10-12.5 MG per tablet; TAKE 1 TABLET BY MOUTH ONCE DAILY

## 2025-01-27 ENCOUNTER — OFFICE VISIT (OUTPATIENT)
Dept: PODIATRY | Age: 50
End: 2025-01-27
Payer: MEDICAID

## 2025-01-27 VITALS — WEIGHT: 220 LBS | HEIGHT: 67 IN | BODY MASS INDEX: 34.53 KG/M2

## 2025-01-27 DIAGNOSIS — M79.604 PAIN IN BOTH LOWER EXTREMITIES: ICD-10-CM

## 2025-01-27 DIAGNOSIS — L60.0 INGROWN NAIL: ICD-10-CM

## 2025-01-27 DIAGNOSIS — D23.71 BENIGN NEOPLASM OF SKIN OF RIGHT FOOT: ICD-10-CM

## 2025-01-27 DIAGNOSIS — B35.1 DERMATOPHYTOSIS OF NAIL: Primary | ICD-10-CM

## 2025-01-27 DIAGNOSIS — M79.605 PAIN IN BOTH LOWER EXTREMITIES: ICD-10-CM

## 2025-01-27 DIAGNOSIS — D23.72 BENIGN NEOPLASM OF SKIN OF LEFT FOOT: ICD-10-CM

## 2025-01-27 DIAGNOSIS — L84 HELOMA MOLLE: ICD-10-CM

## 2025-01-27 DIAGNOSIS — R26.2 DIFFICULTY WALKING: ICD-10-CM

## 2025-01-27 DIAGNOSIS — B35.3 TINEA PEDIS OF BOTH FEET: ICD-10-CM

## 2025-01-27 DIAGNOSIS — L85.3 XEROSIS CUTIS: ICD-10-CM

## 2025-01-27 PROCEDURE — 11721 DEBRIDE NAIL 6 OR MORE: CPT | Performed by: PODIATRIST

## 2025-01-27 PROCEDURE — 3017F COLORECTAL CA SCREEN DOC REV: CPT | Performed by: PODIATRIST

## 2025-01-27 PROCEDURE — 1036F TOBACCO NON-USER: CPT | Performed by: PODIATRIST

## 2025-01-27 PROCEDURE — 17110 DESTRUCTION B9 LES UP TO 14: CPT | Performed by: PODIATRIST

## 2025-01-27 PROCEDURE — G8427 DOCREV CUR MEDS BY ELIG CLIN: HCPCS | Performed by: PODIATRIST

## 2025-01-27 PROCEDURE — 99214 OFFICE O/P EST MOD 30 MIN: CPT | Performed by: PODIATRIST

## 2025-01-27 PROCEDURE — G8417 CALC BMI ABV UP PARAM F/U: HCPCS | Performed by: PODIATRIST

## 2025-01-27 RX ORDER — UREA 40 %
CREAM (GRAM) TOPICAL
Qty: 120 G | Refills: 2 | Status: SHIPPED | OUTPATIENT
Start: 2025-01-27

## 2025-01-27 NOTE — PROGRESS NOTES
[facility] PODIATRY [address]  [address]  [place] [id number]  Dept: [phone]  Dept Fax: [phone]    DIABETIC NAIL & BENIGN NEOPLASM PROGRESS NOTE  Date of patient's visit: 1/27/2025  Patient's Name:  Asif Mistry YOB: 1975            Patient Care Team:  Usha Dwyer APRN - CNP as PCP - General (Certified Nurse Practitioner)  Usha Dwyer APRN - CNP as PCP - [facility]  Santino Chris MD as Consulting Physician (Ophthalmology)  Eden Alfaro DPM as Physician (Podiatry)          Chief Complaint   Patient presents with    Diabetes     Diabetic foot care   Bs and A1c unknown     Peripheral Neuropathy     Bilateral feet     Benign Neoplasm     Bilateral feet          Subjective:   Asif Mistry comes to clinic for Diabetes (Diabetic foot care /Bs and A1c unknown ), Peripheral Neuropathy (Bilateral feet ), and Benign Neoplasm (Bilateral feet )    he is a diabetic and states that needs toenails and callous trimmed today.  Pt currently has complaint of thickened, elongated nails that they cannot manage by themselves.  Pt's primary care physician is Usha Dwyer APRN - CNP last seen 11/5/24.   Pt's last blood sugar was patient states did not check today.     He also complains of painful lesions on hisfeet.. They have been present for a few month(s) duration.  The lesions are present on the bilateral foot. The patient has 4 lesion that is deep seated andhas a painful core. Treatment has included debridement.  He states there is increased pain to the right fourth webspace.       Lab Results   Component Value Date    LABA1C 6.4 11/05/2024      Complains of numbness in the feet bilat.  Past Medical History:   Diagnosis Date    Dry skin     GERD (gastroesophageal reflux disease)     Hematuria     HLD (hyperlipidemia) 3/2/2015    Hypertension     AIDEN (obstructive sleep apnea)     Pre-diabetes

## 2025-02-11 NOTE — FLOWSHEET NOTE
Infectious Disease Progress Note      Patient: Mauricio Brantley Date: 2025   : 1956 Attending: Melanie Garcia MD   68 year old male        Subjective:   He was transferred out of intensive care unit over the weekend.  He was seen resting comfortably in bed.  Currently on 7 L of oxygen.  He is awake and answering questions appropriately.  Does complain of some chest congestion.  Denies any fevers or chills.    Review of Systems:  Review of Systems   As per HPI.    Objective:  Vitals with min/max:      Vital Last Value 24 Hour Range   Temperature 97.7 °F (36.5 °C) (25) Temp  Min: 97.5 °F (36.4 °C)  Max: 97.7 °F (36.5 °C)   Pulse 67 (25) Pulse  Min: 67  Max: 90   Respiratory 18 (25) Resp  Min: 16  Max: 18   Non-Invasive  Blood Pressure 124/80 (25 0454) BP  Min: 94/51  Max: 146/81   Pulse Oximetry 90 % (25 0837) SpO2  Min: 90 %  Max: 98 %   Arterial   Blood Pressure 122/50 (25 1845) No data recorded        Physical Exam:  Physical Exam  Vitals and nursing note reviewed.   Constitutional:       Appearance: He is ill-appearing.   HENT:      Head: Normocephalic and atraumatic.      Neck: Normal range of motion and neck supple.   Eyes:      Conjunctiva/sclera: Conjunctivae normal.   Cardiovascular:      Rate and Rhythm: Normal rate and regular rhythm.      Pulses: Normal pulses.      Heart sounds: Normal heart sounds. No murmur heard.  Pulmonary:      Effort: Pulmonary effort is normal.      Breath sounds: Rales present.   Abdominal:      General: Bowel sounds are normal.      Palpations: Abdomen is soft.      Tenderness: There is no abdominal tenderness. There is no guarding.      Comments: Ileostomy in place.  G-tube is in place surrounding area without erythema or drainage   Musculoskeletal:      Comments: Dry gangrene right hallux.   Skin:     General: Skin is warm and dry.   Neurological:      Mental Status: Mental status is at baseline.             Laboratory Results:  Recent Labs   Lab 02/11/25  0633 02/11/25  0351 02/10/25  2145 02/10/25  1331 02/10/25  0540 02/10/25  0539 02/09/25  0554 02/05/25  2108 02/05/25  1405   WBC  --  8.2  --   --  7.1  --  7.2   < > 7.4   HCT  --  24.9*  --   --  26.0*  --  28.1*   < > 23.9*   HGB  --  7.4*  --   --  8.1*  --  8.3*   < > 7.3*   PLT  --  269  --   --  294  --  323   < > 333   INR  --   --   --   --   --   --   --   --  1.3   PTT 50* 57* 57*   < >  --    < > 46*   < >  --    SODIUM  --  135  --   --  138  --  140   < >  --    POTASSIUM  --  3.7  --   --  3.8  --  3.8   < >  --    CHLORIDE  --  99  --   --  101  --  104   < >  --    CO2  --  29  --   --  28  --  27   < >  --    GLUCOSE  --  224*  --   --  178*  --  186*   < >  --    BUN  --  43*  --   --  43*  --  41*   < >  --    CREATININE  --  1.40*  --   --  1.33*  --  1.26*   < >  --     < > = values in this interval not displayed.       XR CHEST AP OR PA   Final Result   RESULTS/IMPRESSION: The ET tube, and left subclavian pacer are in stable   position. Again noted is mild enlarged cardiac size and thoracic aorta.   Lungs are mildly hyper aerated. There is redemonstration of moderate patchy   infiltration in the right midlung zone and small left suprahilar patchy   infiltration. There is no pneumothorax. Trace right pleural effusion is   again seen. Left CP angle appears clear.      Continued follow-up is commended.            Electronically Signed by: DELIA CONWAY MD    Signed on: 2/5/2025 3:25 PM    Workstation ID: [id number]      Esophagogastroduodenoscopy (EGD) w Tube Placement; Percutaneous Endoscopic Gastrostomy (PEG)   Final Result      XR CHEST AP OR PA   Final Result   There is slight improvement in lung aeration on the right. No   pneumothorax is seen.         Electronically Signed by: AMBERLY OCHOA MD    Signed on: 2/3/2025 7:00 AM    Workstation ID: [id number]      XR CHEST POST TUBE OR LINE PLACEMENT 1 VIEW   Final Result  tingling and numbness in radial forearm and thumb. Having MRI on Monday. Objective:  Modalities: HP to rt upper trap seated with rt arm supported. Precautions:  Exercises:  Exercise Reps/ Time Weight/ Level Comments   Posture x  Reviewed in sitting with lumbar roll and why this aligned position is beneficial with retracted scapula and chin retracted, use of towel roll in pillow for neck support supine and sidelying. trial pillow with roll, pillows under or between knees and pillow to support rt arm on side- feels better  (Thera act)   UBE- fwd/bkwd 5 min L1 added   Sitting      Chin retraction  10 x       Shoulder shrug 10 x     Scap retraction 10 x     Shrug/squeeze/ relax/release  10 x  added   pulley 10 x each      Scaption -  5 x     - AA with rt shoulder to avoid hiking and control decent   Slides on mat w/orange slide- row and circles both ways 10 x ea   Added ball 2.2 lb instead of towel   Bicep curls   3 lb     Wrist 3 way  1 lb                Supine      Cane ex- forward press      Focus on control of both arms    Cane ex   - flex       Cane ex - ER            AA/PROM rt shoulder                    Other: Educated in anatomy of deltoid muscles    Manual - Educated in trigger points and deep pressure releases with trial of deep pressure release to 2 areas in rt upper trap with good releases followed by HP to rt upper trap. Treatment Charges: Mins Units   [x]  Modalities 10  0   [x]  Ther Exercise 35 2   [x]  Manual Therapy 11 1   []  Ther Activities       []  Neuro Re-ed     []  Vasocompression     [] Gait     []  Other     Total Treatment time 46 3       Assessment: [x] Progressing toward goals. Added UBE for strengthening and endurance. Reviewed some exercises as above adding shrug/squeeze/relax/release for relaxation. Focus on myofascial for upper trap trigger points    [] No change.      [x] Other:Deltoid appears to be min to no functioning     [x] Patient would continue to benefit from skilled   Endotracheal tube as described above. No change in the lungs.         Electronically Signed by: AMBERLY OCHOA MD    Signed on: 1/30/2025 5:31 PM    Workstation ID: [id number]      XR CHEST AP OR PA   Final Result   1. Increasing pulmonary edema, as well as an hazy bibasilar lung opacities.   2. The prior endotracheal tube is no longer seen. Otherwise stable chest.            Electronically Signed by: HOWARD JAIME M.D.    Signed on: 1/30/2025 1:05 AM    Workstation ID: [id number]      FL VIDEO SWALLOW   Final Result   Modified barium swallow study as described above. Please see   the speech therapist's report for additional details.               Electronically Signed by: ELLEN DIAZ M.D.    Signed on: 1/28/2025 4:07 PM    Workstation ID: [id number]      XR CHEST AP OR PA   Final Result   1. Improvement in ill-defined right lung opacification.   2. Stable cardiomegaly with central pulmonary vessel prominence.   3. Additional background chronic appearing findings as described above.            Electronically Signed by: HOWARD JAIME M.D.    Signed on: 1/26/2025 5:51 AM    Workstation ID: [id number]      Bronchoscopy w Bronchoalveolar Lavage, w Biopsy   Final Result      US VASC LOWER EXTREMITY VENOUS DUPLEX BILATERAL   Final Result   Normal venous ultrasound of both lower extremities.      Electronically Signed by: ENZO SAGASTUME MD    Signed on: 1/24/2025 8:34 AM    Workstation ID: [id number]      CT CHEST WO CONTRAST   Final Result   Severe right lung airspace disease likely pneumonia.      Multifocal left lung airspace opacities concerning for pneumonia.      Small bilateral pleural effusions.      Additional findings as above.         Electronically Signed by: GUILLERMO ARRIOLA M.D.    Signed on: 1/24/2025 4:56 AM    Workstation ID: [id number]      CT HEAD WO CONTRAST   Final Result   No acute intracranial findings.         Electronically Signed by: GUILLERMO ARRIOLA M.D.    Signed on:  1/24/2025 3:54 AM    Workstation ID: [id number]      XR CHEST AP OR PA   Final Result   Endotracheal tube tip is positioned 4.7 cm above the lissett. Feeding tube   tip crosses the gastroesophageal junction and courses off the inferior   margin of the film. Right pleural effusion with right lung infiltrates is   unchanged. Stable blunting of the left lateral costophrenic angle. No   pneumothorax. Heart and pulmonary vessels appear stable. Transvenous   pacemaker in place.            Electronically Signed by: GUILLERMO ARRIOLA M.D.    Signed on: 1/24/2025 2:47 AM    Workstation ID: [id number]      XR CHEST AP OR PA   Final Result   FINDINGS/IMPRESSION:   Worsening diffuse right lung consolidation, particularly at the right   midlung zone. There is increased right pleural effusion most pronounced   along the periphery of the right upper lobe. The cardiomediastinal   silhouette remains unchanged. Stable position of left-sided dual-lead   pacer. Weighted feeding tube extends below the diaphragm.            Electronically Signed by: ELLEN DIAZ M.D.    Signed on: 1/23/2025 10:56 PM    Workstation ID: [id number]      XR NASOGASTRIC TUBE CHECK ABDOMEN   Final Result      Enteric tube in adequate position.            Electronically Signed by: MIN HERNANDEZ MD    Signed on: 1/23/2025 9:19 PM    Workstation ID: [id number]      XR CHEST AP OR PA   Final Result         Similar interstitial opacities. Heart and pulmonary vessels appear   unchanged. No pneumothorax appreciated. Suspect a small right pleural   effusion. Transvenous pacemaker is again noted.      Electronically Signed by: GUILLERMO ARRIOLA M.D.    Signed on: 1/22/2025 12:38 AM    Workstation ID: [id number]      XR ABDOMEN 1 VIEW   Final Result   Impression:   Normal amount of gas within the stomach. Scattered gas within small bowel.   Small amount of colonic gas and stool. No dilated loops of small or large   bowel to indicate bowel obstruction.             Electronically Signed by: GUILLERMO ARRIOLA M.D.    Signed on: 1/22/2025 12:39 AM    Workstation ID: [id number]      XR CHEST AP OR PA   Final Result      There is cardiomegaly with bilateral groundglass opacities greater on the   right. The findings may be related to asymmetric pulmonary edema, fluid   overload, or bilateral pneumonia.      Electronically Signed by: ENZO SAGASTUME MD    Signed on: 1/20/2025 8:21 PM    Workstation ID: [id number]      XR NASOGASTRIC TUBE CHECK ABDOMEN   Final Result   Nasogastric tube tip is at gastric body level.            Electronically Signed by: SANTO MUNOZ M.D.    Signed on: 1/14/2025 2:53 AM    Workstation ID: [id number]      XR ABDOMEN 2 VIEWS   Final Result   RESULTS/IMPRESSION: Noted is mildly distended bowel loops in the mid to   lower abdomen with multiple small to large air-fluid levels, suspected for   distal small bowel obstruction. Air distended stomach is nonspecific. There   are multiple surgical clips projecting of the abdomen and pelvis. Vascular   calcifications are noted. There is advanced osteophytic changes of spine.            Electronically Signed by: DELIA CONWAY MD    Signed on: 1/13/2025 8:06 PM    Workstation ID: [id number]      CT HEAD WO CONTRAST   Final Result      1. No acute intracranial findings.   2. Mild global volume loss and chronic microvascular ischemic change.      Electronically Signed by: ELLEN DIAZ M.D.    Signed on: 1/2/2025 6:15 PM    Workstation ID: [id number]      US KIDNEY BILATERAL   Final Result   Normal-sized kidneys without hydronephrosis or stones.      Electronically Signed by: AMBERLY OCHOA MD    Signed on: 1/1/2025 8:54 AM    Workstation ID: [id number]      XR NASOGASTRIC TUBE CHECK ABDOMEN   Final Result      XR FOOT 3 OR MORE VIEWS BILATERAL   Final Result      Severe osteoarthritis at the first MTP joint. Mild hallux valgus deformity.   Small and moderate osteophytes are noted in  multiple articulations no   convincing acute fracture or dislocation. No convincing focal destructive   lesion. No acute fracture or dislocation detected. There is some calcified   atherosclerotic arterial plaque.. Please note that radiography is low in   sensitivity for detection of osteomyelitis..      Electronically Signed by: MARTY MOREJON MD    Signed on: 12/31/2024 2:59 PM    Workstation ID: [id number]             Cultures:   Microbiology Results       None             Assessment:     1.  Healthcare associated pneumonia/aspiration.  Status post bronchoscopy x 2.  BAL cultures with yeast representing oral contamination.  Status post medical treatment.    2.  Recurrent acute hypoxic respiratory failure due to aspiration.  Now status post G-tube placement.  Remains on 7 L of oxygen per nasal cannula    3.  Right hallux osteomyelitis/gangrenous wound probes to bone.  Podiatry plans to do amputation as an outpatient.    5.  MARIZA/CKD.    6.  Severe peripheral vascular disease awaiting revascularization.    8.  History of colon cancer status post ileostomy.    9.  Diabetes.    10.  Non-small cell carcinoma.    Plan:      Continue p.o. doxycycline until hallux amputation.  Monitor hypoxia wean O2 as tolerated  Continue with aspiration precautions  Discharge planning is in the process    The above was discussed with the patient, nursing staff and reviewed in detail with Dr. Moreno          2/11/2025 9:54 AM

## 2025-04-18 NOTE — TELEPHONE ENCOUNTER
Last visit: 11/5/24   Last Med refill: 1/21/25  Does patient have enough medication for 72 hours:unknown, electronic request    Next Visit Date:  Future Appointments   Date Time Provider Department Center   4/28/2025  1:30 PM Eden Alfaro DPM [facility]   5/5/2025  1:30 PM Usha Dwyer, APRN - CNP [facility] ECC DEP       Health Maintenance   Topic Date Due    Diabetic retinal exam  Never done    Hepatitis C screen  Never done    Hepatitis B vaccine (1 of 3 - 19+ 3-dose series) Never done    Pneumococcal 50+ years Vaccine (1 of 2 - PCV) Never done    COVID-19 Vaccine (4 - 2024-25 season) 09/01/2024    Shingles vaccine (1 of 2) Never done    Colorectal Cancer Screen  12/22/2024    Depression Screen  04/29/2025    Flu vaccine (Season Ended) 11/05/2025 (Originally 8/1/2025)    A1C test (Diabetic or Prediabetic)  11/05/2025    Diabetic Alb to Cr ratio (uACR) test  11/05/2025    Lipids  11/05/2025    GFR test (Diabetes, CKD 3-4, OR last GFR 15-59)  11/05/2025    Diabetic foot exam  01/27/2026    DTaP/Tdap/Td vaccine (2 - Td or Tdap) 12/16/2027    HIV screen  Completed    Hepatitis A vaccine  Aged Out    Hib vaccine  Aged Out    Polio vaccine  Aged Out    Meningococcal (ACWY) vaccine  Aged Out    Meningococcal B vaccine  Aged Out       Hemoglobin A1C (%)   Date Value   11/05/2024 6.4   04/29/2024 6.8   10/30/2023 7.0             ( goal A1C is < 7)   No components found for: \"LABMICR\"  No components found for: \"LDLCHOLESTEROL\", \"LDLCALC\"    (goal LDL is <100)   AST (U/L)   Date Value   11/05/2024 37     ALT (U/L)   Date Value   11/05/2024 35     BUN (mg/dL)   Date Value   11/05/2024 7     BP Readings from Last 3 Encounters:   11/05/24 122/74   04/29/24 110/80   01/04/24 109/69          (goal 120/80)    All Future Testing planned in [facility]  Lab Frequency Next Occurrence               Patient Active Problem List:     Hypertension     Rectal bleeding     GERD (gastroesophageal reflux disease)

## 2025-04-22 RX ORDER — ATORVASTATIN CALCIUM 20 MG/1
20 TABLET, FILM COATED ORAL DAILY
Qty: 90 TABLET | Refills: 1 | Status: SHIPPED | OUTPATIENT
Start: 2025-04-22

## 2025-05-13 ENCOUNTER — HOSPITAL ENCOUNTER (OUTPATIENT)
Age: 50
Setting detail: SPECIMEN
Discharge: HOME | End: 2025-05-13

## 2025-05-13 ENCOUNTER — OFFICE VISIT (OUTPATIENT)
Dept: FAMILY MEDICINE CLINIC | Age: 50
End: 2025-05-13
Payer: MEDICAID

## 2025-05-13 VITALS
WEIGHT: 227 LBS | SYSTOLIC BLOOD PRESSURE: 124 MMHG | BODY MASS INDEX: 35.55 KG/M2 | DIASTOLIC BLOOD PRESSURE: 80 MMHG | HEART RATE: 97 BPM | OXYGEN SATURATION: 98 %

## 2025-05-13 DIAGNOSIS — G63 POLYNEUROPATHY ASSOCIATED WITH UNDERLYING DISEASE: ICD-10-CM

## 2025-05-13 DIAGNOSIS — I10 PRIMARY HYPERTENSION: ICD-10-CM

## 2025-05-13 DIAGNOSIS — E11.42 TYPE 2 DIABETES MELLITUS WITH DIABETIC POLYNEUROPATHY, WITHOUT LONG-TERM CURRENT USE OF INSULIN (HCC): ICD-10-CM

## 2025-05-13 DIAGNOSIS — E11.42 TYPE 2 DIABETES MELLITUS WITH DIABETIC POLYNEUROPATHY, WITHOUT LONG-TERM CURRENT USE OF INSULIN (HCC): Primary | ICD-10-CM

## 2025-05-13 DIAGNOSIS — Z12.12 ENCOUNTER FOR SCREENING FOR COLORECTAL MALIGNANT NEOPLASM: ICD-10-CM

## 2025-05-13 DIAGNOSIS — Z12.11 SCREENING FOR MALIGNANT NEOPLASM OF COLON: ICD-10-CM

## 2025-05-13 DIAGNOSIS — Z76.0 MEDICATION REFILL: ICD-10-CM

## 2025-05-13 DIAGNOSIS — Z12.11 ENCOUNTER FOR SCREENING FOR COLORECTAL MALIGNANT NEOPLASM: ICD-10-CM

## 2025-05-13 LAB
ANION GAP SERPL CALCULATED.3IONS-SCNC: 11 MMOL/L (ref 9–16)
BUN SERPL-MCNC: 10 MG/DL (ref 6–20)
CALCIUM SERPL-MCNC: 9.9 MG/DL (ref 8.6–10.4)
CHLORIDE SERPL-SCNC: 103 MMOL/L (ref 98–107)
CO2 SERPL-SCNC: 27 MMOL/L (ref 20–31)
CREAT SERPL-MCNC: 0.8 MG/DL (ref 0.7–1.2)
ERYTHROCYTE [DISTWIDTH] IN BLOOD BY AUTOMATED COUNT: 15.4 % (ref 11.8–14.4)
GFR, ESTIMATED: >90 ML/MIN/1.73M2
GLUCOSE SERPL-MCNC: 120 MG/DL (ref 74–99)
HBA1C MFR BLD: 6.5 %
HCT VFR BLD AUTO: 38.9 % (ref 40.7–50.3)
HGB BLD-MCNC: 12.6 G/DL (ref 13–17)
MCH RBC QN AUTO: 28.5 PG (ref 25.2–33.5)
MCHC RBC AUTO-ENTMCNC: 32.4 G/DL (ref 28.4–34.8)
MCV RBC AUTO: 88 FL (ref 82.6–102.9)
NRBC BLD-RTO: 0 PER 100 WBC
PLATELET # BLD AUTO: 279 K/UL (ref 138–453)
PMV BLD AUTO: 9.4 FL (ref 8.1–13.5)
POTASSIUM SERPL-SCNC: 4.4 MMOL/L (ref 3.7–5.3)
RBC # BLD AUTO: 4.42 M/UL (ref 4.21–5.77)
SODIUM SERPL-SCNC: 141 MMOL/L (ref 136–145)
WBC OTHER # BLD: 7 K/UL (ref 3.5–11.3)

## 2025-05-13 PROCEDURE — 1036F TOBACCO NON-USER: CPT | Performed by: NURSE PRACTITIONER

## 2025-05-13 PROCEDURE — 3074F SYST BP LT 130 MM HG: CPT | Performed by: NURSE PRACTITIONER

## 2025-05-13 PROCEDURE — 2022F DILAT RTA XM EVC RTNOPTHY: CPT | Performed by: NURSE PRACTITIONER

## 2025-05-13 PROCEDURE — G8427 DOCREV CUR MEDS BY ELIG CLIN: HCPCS | Performed by: NURSE PRACTITIONER

## 2025-05-13 PROCEDURE — 3079F DIAST BP 80-89 MM HG: CPT | Performed by: NURSE PRACTITIONER

## 2025-05-13 PROCEDURE — 99214 OFFICE O/P EST MOD 30 MIN: CPT | Performed by: NURSE PRACTITIONER

## 2025-05-13 PROCEDURE — G8417 CALC BMI ABV UP PARAM F/U: HCPCS | Performed by: NURSE PRACTITIONER

## 2025-05-13 PROCEDURE — 83036 HEMOGLOBIN GLYCOSYLATED A1C: CPT | Performed by: NURSE PRACTITIONER

## 2025-05-13 PROCEDURE — 3017F COLORECTAL CA SCREEN DOC REV: CPT | Performed by: NURSE PRACTITIONER

## 2025-05-13 PROCEDURE — 3044F HG A1C LEVEL LT 7.0%: CPT | Performed by: NURSE PRACTITIONER

## 2025-05-13 RX ORDER — LISINOPRIL AND HYDROCHLOROTHIAZIDE 10; 12.5 MG/1; MG/1
1 TABLET ORAL DAILY
Qty: 90 TABLET | Refills: 1 | Status: SHIPPED | OUTPATIENT
Start: 2025-05-13

## 2025-05-13 SDOH — ECONOMIC STABILITY: FOOD INSECURITY: WITHIN THE PAST 12 MONTHS, THE FOOD YOU BOUGHT JUST DIDN'T LAST AND YOU DIDN'T HAVE MONEY TO GET MORE.: NEVER TRUE

## 2025-05-13 SDOH — ECONOMIC STABILITY: FOOD INSECURITY: WITHIN THE PAST 12 MONTHS, YOU WORRIED THAT YOUR FOOD WOULD RUN OUT BEFORE YOU GOT MONEY TO BUY MORE.: NEVER TRUE

## 2025-05-13 ASSESSMENT — PATIENT HEALTH QUESTIONNAIRE - PHQ9
SUM OF ALL RESPONSES TO PHQ QUESTIONS 1-9: 0
SUM OF ALL RESPONSES TO PHQ QUESTIONS 1-9: 0
1. LITTLE INTEREST OR PLEASURE IN DOING THINGS: NOT AT ALL
SUM OF ALL RESPONSES TO PHQ QUESTIONS 1-9: 0
2. FEELING DOWN, DEPRESSED OR HOPELESS: NOT AT ALL
SUM OF ALL RESPONSES TO PHQ QUESTIONS 1-9: 0

## 2025-05-13 ASSESSMENT — ENCOUNTER SYMPTOMS
SHORTNESS OF BREATH: 0
COUGH: 0

## 2025-05-13 NOTE — TELEPHONE ENCOUNTER
Last visit: 11/5/24  Last Med refill: 2/13/25  Does patient have enough medication for 72 hours: unknown, electronic request    Next Visit Date:  Future Appointments   Date Time Provider Department Center   5/13/2025  2:30 PM Usha Dwyer, APRN - CNP [facility] ECC DEP   7/7/2025  3:30 PM Eden Alfaro DPM [facility]       Health Maintenance   Topic Date Due    Diabetic retinal exam  Never done    Hepatitis C screen  Never done    Hepatitis B vaccine (1 of 3 - 19+ 3-dose series) Never done    Pneumococcal 50+ years Vaccine (1 of 2 - PCV) Never done    COVID-19 Vaccine (4 - 2024-25 season) 09/01/2024    Shingles vaccine (1 of 2) Never done    Colorectal Cancer Screen  12/22/2024    Depression Screen  04/29/2025    Flu vaccine (Season Ended) 11/05/2025 (Originally 8/1/2025)    A1C test (Diabetic or Prediabetic)  11/05/2025    Diabetic Alb to Cr ratio (uACR) test  11/05/2025    Lipids  11/05/2025    GFR test (Diabetes, CKD 3-4, OR last GFR 15-59)  11/05/2025    Diabetic foot exam  01/27/2026    DTaP/Tdap/Td vaccine (2 - Td or Tdap) 12/16/2027    HIV screen  Completed    Hepatitis A vaccine  Aged Out    Hib vaccine  Aged Out    Polio vaccine  Aged Out    Meningococcal (ACWY) vaccine  Aged Out    Meningococcal B vaccine  Aged Out       Hemoglobin A1C (%)   Date Value   11/05/2024 6.4   04/29/2024 6.8   10/30/2023 7.0             ( goal A1C is < 7)   No components found for: \"LABMICR\"  No components found for: \"LDLCHOLESTEROL\", \"LDLCALC\"    (goal LDL is <100)   AST (U/L)   Date Value   11/05/2024 37     ALT (U/L)   Date Value   11/05/2024 35     BUN (mg/dL)   Date Value   11/05/2024 7     BP Readings from Last 3 Encounters:   11/05/24 122/74   04/29/24 110/80   01/04/24 109/69          (goal 120/80)    All Future Testing planned in [facility]  Lab Frequency Next Occurrence               Patient Active Problem List:     Hypertension     Rectal bleeding     GERD (gastroesophageal reflux disease)

## 2025-05-13 NOTE — PROGRESS NOTES
oriented to person, place, and time.                  An electronic signature was used to authenticate this note.    --TRES CUELLO - CNP

## 2025-05-14 ENCOUNTER — TELEPHONE (OUTPATIENT)
Dept: GASTROENTEROLOGY | Age: 50
End: 2025-05-14

## 2025-05-14 ENCOUNTER — RESULTS FOLLOW-UP (OUTPATIENT)
Dept: PRIMARY CARE CLINIC | Age: 50
End: 2025-05-14

## 2025-05-14 DIAGNOSIS — D64.9 LOW HEMOGLOBIN: Primary | ICD-10-CM

## 2025-05-14 RX ORDER — LISINOPRIL AND HYDROCHLOROTHIAZIDE 10; 12.5 MG/1; MG/1
1 TABLET ORAL DAILY
Qty: 90 TABLET | Refills: 1 | OUTPATIENT
Start: 2025-05-14

## 2025-05-14 NOTE — TELEPHONE ENCOUNTER
Last visit: 05/13/2025  Last Med refill: 05/13/2025  Does patient have enough medication for 72 hours: Yes  05/13/2025  Next Visit Date:  Future Appointments   Date Time Provider Department Center   7/7/2025  3:30 PM Eden Alfaro DPM [facility]   11/17/2025  2:45 PM Usha Dwyer, APRN - CNP [facility] ECC DEP       Health Maintenance   Topic Date Due    Diabetic retinal exam  Never done    Hepatitis C screen  Never done    Hepatitis B vaccine (1 of 3 - 19+ 3-dose series) Never done    Pneumococcal 50+ years Vaccine (1 of 2 - PCV) Never done    COVID-19 Vaccine (4 - 2024-25 season) 09/01/2024    Shingles vaccine (1 of 2) Never done    Colorectal Cancer Screen  12/22/2024    Flu vaccine (Season Ended) 11/05/2025 (Originally 8/1/2025)    Diabetic Alb to Cr ratio (uACR) test  11/05/2025    Lipids  11/05/2025    Diabetic foot exam  01/27/2026    A1C test (Diabetic or Prediabetic)  05/13/2026    Depression Screen  05/13/2026    GFR test (Diabetes, CKD 3-4, OR last GFR 15-59)  05/13/2026    DTaP/Tdap/Td vaccine (2 - Td or Tdap) 12/16/2027    HIV screen  Completed    Hepatitis A vaccine  Aged Out    Hib vaccine  Aged Out    Polio vaccine  Aged Out    Meningococcal (ACWY) vaccine  Aged Out    Meningococcal B vaccine  Aged Out       Hemoglobin A1C (%)   Date Value   05/13/2025 6.5   11/05/2024 6.4   04/29/2024 6.8             ( goal A1C is < 7)   No components found for: \"LABMICR\"  No components found for: \"LDLCHOLESTEROL\", \"LDLCALC\"    (goal LDL is <100)   AST (U/L)   Date Value   11/05/2024 37     ALT (U/L)   Date Value   11/05/2024 35     BUN (mg/dL)   Date Value   05/13/2025 10     BP Readings from Last 3 Encounters:   05/13/25 124/80   11/05/24 122/74   04/29/24 110/80          (goal 120/80)    All Future Testing planned in [facility]  Lab Frequency Next Occurrence   CBC Once 05/14/2025               Patient Active Problem List:     Hypertension     Rectal bleeding     GERD (gastroesophageal

## 2025-05-19 ENCOUNTER — PREP FOR PROCEDURE (OUTPATIENT)
Dept: GASTROENTEROLOGY | Age: 50
End: 2025-05-19

## 2025-05-19 ENCOUNTER — TELEPHONE (OUTPATIENT)
Dept: GASTROENTEROLOGY | Age: 50
End: 2025-05-19

## 2025-05-27 ENCOUNTER — PREP FOR PROCEDURE (OUTPATIENT)
Dept: GASTROENTEROLOGY | Age: 50
End: 2025-05-27

## 2025-05-27 DIAGNOSIS — Z12.11 SCREEN FOR COLON CANCER: ICD-10-CM

## 2025-06-17 ENCOUNTER — HOSPITAL ENCOUNTER (OUTPATIENT)
Dept: PREADMISSION TESTING | Age: 50
Discharge: HOME | End: 2025-06-21

## 2025-06-17 VITALS — HEIGHT: 67 IN | WEIGHT: 228 LBS | BODY MASS INDEX: 35.79 KG/M2

## 2025-06-17 NOTE — PROGRESS NOTES

## 2025-06-26 PROBLEM — Z12.11 SCREEN FOR COLON CANCER: Status: RESOLVED | Noted: 2025-05-27 | Resolved: 2025-06-26

## 2025-06-27 ENCOUNTER — HOSPITAL ENCOUNTER (EMERGENCY)
Age: 50
Discharge: HOME | End: 2025-06-27
Attending: EMERGENCY MEDICINE
Payer: MEDICAID

## 2025-06-27 ENCOUNTER — APPOINTMENT (OUTPATIENT)
Dept: GENERAL RADIOLOGY | Age: 50
End: 2025-06-27
Payer: MEDICAID

## 2025-06-27 VITALS
RESPIRATION RATE: 16 BRPM | HEART RATE: 98 BPM | TEMPERATURE: 98.1 F | DIASTOLIC BLOOD PRESSURE: 89 MMHG | SYSTOLIC BLOOD PRESSURE: 126 MMHG | OXYGEN SATURATION: 96 %

## 2025-06-27 DIAGNOSIS — S61.411A LACERATION OF RIGHT HAND WITHOUT FOREIGN BODY, INITIAL ENCOUNTER: Primary | ICD-10-CM

## 2025-06-27 PROCEDURE — 12001 RPR S/N/AX/GEN/TRNK 2.5CM/<: CPT

## 2025-06-27 PROCEDURE — 6370000000 HC RX 637 (ALT 250 FOR IP): Performed by: EMERGENCY MEDICINE

## 2025-06-27 PROCEDURE — 99283 EMERGENCY DEPT VISIT LOW MDM: CPT | Performed by: EMERGENCY MEDICINE

## 2025-06-27 PROCEDURE — 12001 RPR S/N/AX/GEN/TRNK 2.5CM/<: CPT | Performed by: EMERGENCY MEDICINE

## 2025-06-27 PROCEDURE — 73130 X-RAY EXAM OF HAND: CPT

## 2025-06-27 PROCEDURE — 90715 TDAP VACCINE 7 YRS/> IM: CPT | Performed by: EMERGENCY MEDICINE

## 2025-06-27 PROCEDURE — 6360000002 HC RX W HCPCS: Performed by: EMERGENCY MEDICINE

## 2025-06-27 PROCEDURE — 90471 IMMUNIZATION ADMIN: CPT | Performed by: EMERGENCY MEDICINE

## 2025-06-27 RX ORDER — GINSENG 100 MG
CAPSULE ORAL 3 TIMES DAILY
Status: DISCONTINUED | OUTPATIENT
Start: 2025-06-27 | End: 2025-06-27

## 2025-06-27 RX ORDER — GINSENG 100 MG
CAPSULE ORAL 3 TIMES DAILY
Status: DISCONTINUED | OUTPATIENT
Start: 2025-06-27 | End: 2025-06-27 | Stop reason: HOSPADM

## 2025-06-27 RX ORDER — LIDOCAINE HYDROCHLORIDE 10 MG/ML
5 INJECTION, SOLUTION INFILTRATION; PERINEURAL ONCE
Status: COMPLETED | OUTPATIENT
Start: 2025-06-27 | End: 2025-06-27

## 2025-06-27 RX ORDER — CEPHALEXIN 500 MG/1
500 CAPSULE ORAL 2 TIMES DAILY
Qty: 14 CAPSULE | Refills: 0 | Status: SHIPPED | OUTPATIENT
Start: 2025-06-27 | End: 2025-07-04

## 2025-06-27 RX ADMIN — TETANUS TOXOID, REDUCED DIPHTHERIA TOXOID AND ACELLULAR PERTUSSIS VACCINE, ADSORBED 0.5 ML: 5; 2.5; 8; 8; 2.5 SUSPENSION INTRAMUSCULAR at 16:19

## 2025-06-27 RX ADMIN — BACITRACIN: 500 OINTMENT TOPICAL at 17:42

## 2025-06-27 RX ADMIN — LIDOCAINE HYDROCHLORIDE 5 ML: 10 INJECTION, SOLUTION INFILTRATION; PERINEURAL at 17:32

## 2025-06-27 ASSESSMENT — LIFESTYLE VARIABLES
HOW MANY STANDARD DRINKS CONTAINING ALCOHOL DO YOU HAVE ON A TYPICAL DAY: 1 OR 2
HOW OFTEN DO YOU HAVE A DRINK CONTAINING ALCOHOL: MONTHLY OR LESS

## 2025-06-27 ASSESSMENT — ENCOUNTER SYMPTOMS
ABDOMINAL PAIN: 0
FACIAL SWELLING: 0
DIARRHEA: 0
VOICE CHANGE: 0
WHEEZING: 0
SHORTNESS OF BREATH: 0
COUGH: 0
VOMITING: 0
NAUSEA: 0

## 2025-06-27 ASSESSMENT — PAIN SCALES - GENERAL: PAINLEVEL_OUTOF10: 8

## 2025-06-27 ASSESSMENT — PAIN - FUNCTIONAL ASSESSMENT: PAIN_FUNCTIONAL_ASSESSMENT: 0-10

## 2025-06-27 NOTE — ED NOTES
Pt to ed w/ c/o finger lac.   Pt states he cut his finger w/ a knife a few hours ago.   Pt states bleeding has been controlled.   Pt doesn't know when the last time he had a tetanus shot.   Pt resting in cot and call light is in reach.

## 2025-06-27 NOTE — PRE-PROCEDURE INSTRUCTIONS
Have you received your Prep? Any questions with prep instructions?  Only Clear Liquid Diet day before.  Nothing to eat after midnight day before procedure.  Are you taking any blood thinners? If so, you need to Stop.  Remove any jewelry and body piercings.  Do you wear glasses? If so, please bring a case to store them in.  Are you having any Covid symptoms?  Do you have any new rashes, infections, etc. that we should be aware of?  Do you have a ride home the day of surgery? It cannot be a cab or medical transportation.  Verify surgery time/date and what time to arrive at hospital.  NO ANSWER, VM LEFT TO ARRIVE AT 0830

## 2025-06-27 NOTE — ED PROVIDER NOTES
[facility] EMERGENCY DEPARTMENT  Emergency Department Encounter  Emergency Medicine Attending     Pt Name:Asif Mistry  MRN: [id number]  Birthdate 1975  Date of evaluation: 25  PCP:  Usha Dwyer APRN - CNP  Note Started: 5:31 PM EDT      CHIEF COMPLAINT       Chief Complaint   Patient presents with    Laceration       HISTORY OF PRESENT ILLNESS  (Location/Symptom, Timing/Onset, Context/Setting, Quality, Duration, Modifying Factors, Severity.)      Asif Mistry is a 49 y.o. male who presents with laceration, 2 cm laceration to right hand at the base of the ring finger, cut with a knife, came directly here.    No numbness, no weakness    PAST MEDICAL / SURGICAL / SOCIAL / FAMILY HISTORY      has a past medical history of Dry skin, GERD (gastroesophageal reflux disease), Hematuria, HLD (hyperlipidemia), Hypertension, AIDEN (obstructive sleep apnea), Pre-diabetes, Sleep apnea, and Wears dentures.       has a past surgical history that includes arthroplasty; Colonoscopy (2014); Foot surgery (Left, 2017); Ankle surgery (Left); Cystoscopy (2017); and Cystoscopy (Bilateral, 3/21/2017).      Social History     Socioeconomic History    Marital status: Single     Spouse name: Not on file    Number of children: Not on file    Years of education: Not on file    Highest education level: Not on file   Occupational History    Not on file   Tobacco Use    Smoking status: Former     Types: Cigars     Quit date: 2019     Years since quittin.5    Smokeless tobacco: Never    Tobacco comments:     occassionally   Vaping Use    Vaping status: Never Used   Substance and Sexual Activity    Alcohol use: Yes     Alcohol/week: 4.2 standard drinks of alcohol     Types: 5 Standard drinks or equivalent per week     Comment: weekends/ 6 drinks    Drug use: No    Sexual activity: Not on file   Other Topics Concern    Not on file   Social History Narrative    Not on file     Social

## 2025-06-30 ENCOUNTER — ANESTHESIA EVENT (OUTPATIENT)
Dept: ENDOSCOPY | Age: 50
End: 2025-06-30
Payer: MEDICAID

## 2025-07-01 ENCOUNTER — HOSPITAL ENCOUNTER (OUTPATIENT)
Age: 50
Setting detail: OUTPATIENT SURGERY
Discharge: HOME | End: 2025-07-01
Attending: STUDENT IN AN ORGANIZED HEALTH CARE EDUCATION/TRAINING PROGRAM | Admitting: STUDENT IN AN ORGANIZED HEALTH CARE EDUCATION/TRAINING PROGRAM
Payer: MEDICAID

## 2025-07-01 ENCOUNTER — ANESTHESIA (OUTPATIENT)
Dept: ENDOSCOPY | Age: 50
End: 2025-07-01
Payer: MEDICAID

## 2025-07-01 VITALS
DIASTOLIC BLOOD PRESSURE: 89 MMHG | BODY MASS INDEX: 35.79 KG/M2 | WEIGHT: 228 LBS | SYSTOLIC BLOOD PRESSURE: 132 MMHG | RESPIRATION RATE: 20 BRPM | OXYGEN SATURATION: 96 % | TEMPERATURE: 98.1 F | HEIGHT: 67 IN | HEART RATE: 75 BPM

## 2025-07-01 DIAGNOSIS — Z12.11 SCREEN FOR COLON CANCER: ICD-10-CM

## 2025-07-01 PROCEDURE — 3609010300 HC COLONOSCOPY W/BIOPSY SINGLE/MULTIPLE: Performed by: STUDENT IN AN ORGANIZED HEALTH CARE EDUCATION/TRAINING PROGRAM

## 2025-07-01 PROCEDURE — 2580000003 HC RX 258: Performed by: ANESTHESIOLOGY

## 2025-07-01 PROCEDURE — 7100000011 HC PHASE II RECOVERY - ADDTL 15 MIN: Performed by: STUDENT IN AN ORGANIZED HEALTH CARE EDUCATION/TRAINING PROGRAM

## 2025-07-01 PROCEDURE — 6360000002 HC RX W HCPCS

## 2025-07-01 PROCEDURE — 2580000003 HC RX 258

## 2025-07-01 PROCEDURE — 3700000000 HC ANESTHESIA ATTENDED CARE: Performed by: STUDENT IN AN ORGANIZED HEALTH CARE EDUCATION/TRAINING PROGRAM

## 2025-07-01 PROCEDURE — 88305 TISSUE EXAM BY PATHOLOGIST: CPT

## 2025-07-01 PROCEDURE — 7100000010 HC PHASE II RECOVERY - FIRST 15 MIN: Performed by: STUDENT IN AN ORGANIZED HEALTH CARE EDUCATION/TRAINING PROGRAM

## 2025-07-01 PROCEDURE — 2709999900 HC NON-CHARGEABLE SUPPLY: Performed by: STUDENT IN AN ORGANIZED HEALTH CARE EDUCATION/TRAINING PROGRAM

## 2025-07-01 PROCEDURE — 3700000001 HC ADD 15 MINUTES (ANESTHESIA): Performed by: STUDENT IN AN ORGANIZED HEALTH CARE EDUCATION/TRAINING PROGRAM

## 2025-07-01 RX ORDER — LIDOCAINE HYDROCHLORIDE 10 MG/ML
INJECTION, SOLUTION EPIDURAL; INFILTRATION; INTRACAUDAL; PERINEURAL
Status: DISCONTINUED | OUTPATIENT
Start: 2025-07-01 | End: 2025-07-01 | Stop reason: SDUPTHER

## 2025-07-01 RX ORDER — SODIUM CHLORIDE 0.9 % (FLUSH) 0.9 %
5-40 SYRINGE (ML) INJECTION EVERY 12 HOURS SCHEDULED
Status: DISCONTINUED | OUTPATIENT
Start: 2025-07-01 | End: 2025-07-01 | Stop reason: HOSPADM

## 2025-07-01 RX ORDER — PROPOFOL 10 MG/ML
INJECTION, EMULSION INTRAVENOUS
Status: DISCONTINUED | OUTPATIENT
Start: 2025-07-01 | End: 2025-07-01 | Stop reason: SDUPTHER

## 2025-07-01 RX ORDER — SODIUM CHLORIDE, SODIUM LACTATE, POTASSIUM CHLORIDE, CALCIUM CHLORIDE 600; 310; 30; 20 MG/100ML; MG/100ML; MG/100ML; MG/100ML
INJECTION, SOLUTION INTRAVENOUS CONTINUOUS
Status: DISCONTINUED | OUTPATIENT
Start: 2025-07-01 | End: 2025-07-01 | Stop reason: HOSPADM

## 2025-07-01 RX ORDER — LIDOCAINE HYDROCHLORIDE 10 MG/ML
1 INJECTION, SOLUTION EPIDURAL; INFILTRATION; INTRACAUDAL; PERINEURAL
Status: DISCONTINUED | OUTPATIENT
Start: 2025-07-01 | End: 2025-07-01 | Stop reason: HOSPADM

## 2025-07-01 RX ORDER — SODIUM CHLORIDE 0.9 % (FLUSH) 0.9 %
5-40 SYRINGE (ML) INJECTION PRN
Status: DISCONTINUED | OUTPATIENT
Start: 2025-07-01 | End: 2025-07-01 | Stop reason: HOSPADM

## 2025-07-01 RX ORDER — SODIUM CHLORIDE, SODIUM LACTATE, POTASSIUM CHLORIDE, CALCIUM CHLORIDE 600; 310; 30; 20 MG/100ML; MG/100ML; MG/100ML; MG/100ML
INJECTION, SOLUTION INTRAVENOUS
Status: DISCONTINUED | OUTPATIENT
Start: 2025-07-01 | End: 2025-07-01 | Stop reason: SDUPTHER

## 2025-07-01 RX ORDER — SODIUM CHLORIDE 9 MG/ML
INJECTION, SOLUTION INTRAVENOUS PRN
Status: DISCONTINUED | OUTPATIENT
Start: 2025-07-01 | End: 2025-07-01 | Stop reason: HOSPADM

## 2025-07-01 RX ADMIN — PROPOFOL 130 MCG/KG/MIN: 10 INJECTION, EMULSION INTRAVENOUS at 10:22

## 2025-07-01 RX ADMIN — LIDOCAINE HYDROCHLORIDE 50 MG: 10 INJECTION, SOLUTION EPIDURAL; INFILTRATION; INTRACAUDAL; PERINEURAL at 10:32

## 2025-07-01 RX ADMIN — LIDOCAINE HYDROCHLORIDE 50 MG: 10 INJECTION, SOLUTION EPIDURAL; INFILTRATION; INTRACAUDAL; PERINEURAL at 10:20

## 2025-07-01 RX ADMIN — SODIUM CHLORIDE, POTASSIUM CHLORIDE, SODIUM LACTATE AND CALCIUM CHLORIDE: 600; 310; 30; 20 INJECTION, SOLUTION INTRAVENOUS at 09:51

## 2025-07-01 RX ADMIN — SODIUM CHLORIDE, POTASSIUM CHLORIDE, SODIUM LACTATE AND CALCIUM CHLORIDE: 600; 310; 30; 20 INJECTION, SOLUTION INTRAVENOUS at 10:20

## 2025-07-01 RX ADMIN — PROPOFOL 130 MG: 10 INJECTION, EMULSION INTRAVENOUS at 10:21

## 2025-07-01 ASSESSMENT — ENCOUNTER SYMPTOMS
RESPIRATORY NEGATIVE: 1
GASTROINTESTINAL NEGATIVE: 1

## 2025-07-01 ASSESSMENT — PAIN - FUNCTIONAL ASSESSMENT
PAIN_FUNCTIONAL_ASSESSMENT: NONE - DENIES PAIN
PAIN_FUNCTIONAL_ASSESSMENT: 0-10

## 2025-07-01 NOTE — ANESTHESIA PRE PROCEDURE
Department of Anesthesiology  Preprocedure Note       Name:  Asif Mistry   Age:  49 y.o.  :  1975                                          MRN:  [id number]         Date:  2025      Surgeon: Surgeon(s):  Nikunj Cullen MD    Procedure: Procedure(s):  COLORECTAL CANCER SCREENING, NOT HIGH RISK    Medications prior to admission:   Prior to Admission medications    Medication Sig Start Date End Date Taking? Authorizing Provider   cephALEXin (KEFLEX) 500 MG capsule Take 1 capsule by mouth 2 times daily for 7 days 25  Macho Tavera MD   bisacodyl 5 MG EC tablet Take as directed for bowel prep/colonoscopy 25   Nikunj Cullen MD   lisinopril-hydroCHLOROthiazide (PRINZIDE;ZESTORETIC) 10-12.5 MG per tablet TAKE 1 TABLET BY MOUTH DAILY 25   Usha Dwyer APRN - CNP   atorvastatin (LIPITOR) 20 MG tablet TAKE 1 TABLET BY MOUTH DAILY 25   Usha Dwyer APRN - CNP   Urea (CARMOL) 40 % cream Apply to affected area once daily  Patient taking differently: 2 times daily Apply to affected area once daily 25   Eden Alfaro DPM   ammonium lactate (LAC-HYDRIN) 12 % cream Apply topically as needed. 24   Eden Alfaro DPM   Lancets MISC 1 each by Does not apply route daily 21   Usha Dweyr APRN - CNP   blood glucose monitor strips Test 1 time a day & as needed for symptoms of irregular blood glucose. Dispense sufficient amount for indicated testing frequency plus additional to accommodate PRN testing needs. 21   Usha Dwyer APRN - CNP   Blood Glucose Monitoring Suppl (ACURA BLOOD GLUCOSE METER) w/Device KIT 1 Device by Does not apply route once for 1 dose 20  Usha Dwyer APRN - CNP       Current medications:    Current Facility-Administered Medications   Medication Dose Route Frequency Provider Last Rate Last Admin   • sodium chloride flush 0.9 % injection 5-40 mL  5-40 mL IntraVENous 2 times per day Diego

## 2025-07-01 NOTE — OP NOTE
COLONOSCOPY    DATE OF PROCEDURE: 7/1/2025    SURGEON: Nikunj Cullen MD  Facility : [facility]  ASSISTANT: None  PREOPERATIVE DIAGNOSIS: Screening colonoscopy    POSTOPERATIVE DIAGNOSIS: as described below    OPERATION: Total colonoscopy with biopsy    ANESTHESIA: Monitored Anesthesia Care (MAC)    ESTIMATED BLOOD LOSS: less than 50 cc    COMPLICATIONS: None.     SPECIMENS:  Was Obtained:     ID Type Source Tests Collected by Time Destination   A : SIGMOID POLYP Tissue Sigmoid Colon SURGICAL PATHOLOGY Nikunj Cullen MD 7/1/2025 1035         HISTORY: The patient is a 49 y.o. year old male with history of above preop diagnosis.  I recommended colonoscopy with possible biopsy or polypectomy and I explained the risk, benefits, expected outcome, and alternatives to the procedure.  Risks included but are not limited to medication allergy, medication reaction, cardiovascular and respiratory problems, bleeding, perforation, infection, and/or missed diagnosis.  The patient understands and is in agreement.        PROCEDURE: Following arrival in the endoscopy room, the patient was placed in the left lateral decubitus position and final time-out accomplished in the presence of the nursing staff. Baseline vital signs were obtained and reviewed. The patient was given IV Monitored Anesthesia Care (MAC) and vitals monitoring per anesthesia department.     Digital rectal exam- normal    The colonoscope was inserted per rectum and advanced under direct vision to the cecum without difficulty.  Photodocumentation of the maximal extent reached (cecum, appendiceal orifice, ileocecal valve, and terminal ileum if indicated) and other findings was obtained.     Post sedation note :The patient's SPO2 remained above 90% throughout the procedure.the vital signs remained stable , and no immediate complication from the procedure noted, patient will be ready to leave when criteria is met .        The prep was fair to good after

## 2025-07-01 NOTE — H&P
HISTORY and PHYSICAL  [facility]       NAME:  Asif Mistry  MRN: [id number]   YOB: 1975   Date: 7/1/2025   Age: 49 y.o.  Gender: male       COMPLAINT AND PRESENT HISTORY:     Asif Mistry is 49 y.o.,  male, presents for COLORECTAL CANCER SCREENING, NOT HIGH RISK     Primary dx: Screen for colon cancer [Z12.11].  HPI:  Asif Mistry is 49 y.o.,   male, having a Screening Colonoscopy. Prior Colonoscopy done 2014.  Patient denies  hx of Colon Polyps or Diverticulosis.     Patient denies any  FH of Colon Cancer.  Patient denies any  GI symptoms. No nausea / vomiting.  No diarrhea or constipation. No abdominal pains or cramping. No heartburn or dysphagia.  no changes in the color, caliber or consistency of the stools. No fever or chills.  Prep fully completed: yes . Pt reports his last BM is clear liquid     Review of additional significant medical hx:  (See chart for additional detail, including current medications /see ROS for current S/S):     NPO status: pt NPO since the past midnight   Medications taken TODAY (with sip of water): pt took his am medication today with sip of water   Anticoagulation status: none    Denies personal hx of blood clots.  Denies personal hx of MRSA infection.  Denies any personal or family hx of previous complications w/anesthesia.      RECENT IMAGING R/T HPI   XR HAND RIGHT (MIN 3 VIEWS)  Result Date: 6/27/2025  No acute osseous abnormality or radiopaque foreign body. Small osteochondroma versus chronic deformity of the 4th digit proximal phalanx distally.       RECENT LABS, IMAGING AND TESTING     Lab Results   Component Value Date    WBC 7.0 05/13/2025    RBC 4.42 05/13/2025    HGB 12.6 (L) 05/13/2025    HCT 38.9 (L) 05/13/2025    MCV 88.0 05/13/2025    MCH 28.5 05/13/2025    MCHC 32.4 05/13/2025    RDW 15.4 (H) 05/13/2025     05/13/2025    MPV 9.4 05/13/2025        Lab Results   Component Value Date     05/13/2025    K 4.4

## 2025-07-01 NOTE — ANESTHESIA POSTPROCEDURE EVALUATION
Department of Anesthesiology  Postprocedure Note    Patient: Asif Mistry  MRN: [id number]  YOB: 1975  Date of evaluation: 7/1/2025    Procedure Summary       Date: 07/01/25 Room / Location: [place] [id number] / [facility]    Anesthesia Start: 1016 Anesthesia Stop: 1047    Procedure: COLONOSCOPY BIOPSY Diagnosis:       Screen for colon cancer      (Screen for colon cancer [Z12.11])    Surgeons: Nikunj Cullen MD Responsible Provider: Joselito Villalobos MD    Anesthesia Type: general, TIVA ASA Status: 2            Anesthesia Type: No value filed.    Nena Phase I: Nena Score: 10    Nena Phase II: Nena Score: 10    Anesthesia Post Evaluation    Comments: POST- ANESTHESIA EVALUATION       Pt Name: Asif Mistry  MRN: [id number]  YOB: 1975  Date of evaluation: 7/1/2025  Time:  2:16 PM      /89   Pulse 75   Temp 98.1 °F (36.7 °C) (Infrared)   Resp 20   Ht 1.702 m (5' 7\")   Wt 103.4 kg (228 lb)   SpO2 96%   BMI 35.71 kg/m²      Consciousness Level  Awake  Cardiopulmonary Status  Stable  Pain Adequately Treated YES  Nausea / Vomiting  NO  Adequate Hydration  YES  Anesthesia Related Complications NONE      Electronically signed by Joselito Villalobos MD on 7/1/2025 at 2:16 PM           No notable events documented.

## 2025-07-02 LAB — SURGICAL PATHOLOGY REPORT: NORMAL

## 2025-07-31 PROBLEM — Z12.11 SCREEN FOR COLON CANCER: Status: RESOLVED | Noted: 2025-05-27 | Resolved: 2025-07-31

## 2025-08-01 ENCOUNTER — TELEPHONE (OUTPATIENT)
Dept: FAMILY MEDICINE CLINIC | Age: 50
End: 2025-08-01

## (undated) DEVICE — PACK PROCEDURE SURG CYSTO SVMMC LF

## (undated) DEVICE — ENDOSCOPIC KIT 1.1+ 10 FT OP4 CA DE

## (undated) DEVICE — SINGLE USE AIR/WATER, SUCTION AND BIOPSY VALVES SET: Brand: ORCAPOD™

## (undated) DEVICE — GLOVE ORANGE PI 8 1/2   MSG9085

## (undated) DEVICE — FORCEPS BX L240CM JAW DIA2.4MM ORNG L CAP W/ NDL DISP RAD

## (undated) DEVICE — CATHETER URET 5FR L70CM TIP 8FR OPN END CONE TIP INJ HUB

## (undated) DEVICE — GARMENT COMPR STD FOR 17IN CALF UNIF THER FLOTRN

## (undated) DEVICE — GOWN,POLY REINFORCED,LG: Brand: MEDLINE

## (undated) DEVICE — DUP USE 240185 SOLUTION IV IRRIG WATER 1000ML IRRIG BAG 2B7114

## (undated) DEVICE — CATHETER URET 8FR L65CM PLAS OPN CONE TIP RADPQ DISP

## (undated) DEVICE — WHISTLE TIP URETERAL CATHETER (LEFT): Brand: COOK